# Patient Record
Sex: FEMALE | Race: WHITE | NOT HISPANIC OR LATINO | Employment: STUDENT | URBAN - METROPOLITAN AREA
[De-identification: names, ages, dates, MRNs, and addresses within clinical notes are randomized per-mention and may not be internally consistent; named-entity substitution may affect disease eponyms.]

---

## 2017-01-03 ENCOUNTER — LAB VISIT (OUTPATIENT)
Dept: LAB | Facility: HOSPITAL | Age: 22
End: 2017-01-03
Attending: NURSE PRACTITIONER
Payer: MEDICAID

## 2017-01-03 ENCOUNTER — TELEPHONE (OUTPATIENT)
Dept: FAMILY MEDICINE | Facility: CLINIC | Age: 22
End: 2017-01-03

## 2017-01-03 ENCOUNTER — OFFICE VISIT (OUTPATIENT)
Dept: FAMILY MEDICINE | Facility: CLINIC | Age: 22
End: 2017-01-03
Payer: MEDICAID

## 2017-01-03 VITALS
SYSTOLIC BLOOD PRESSURE: 129 MMHG | WEIGHT: 198.44 LBS | BODY MASS INDEX: 35.16 KG/M2 | DIASTOLIC BLOOD PRESSURE: 83 MMHG | HEART RATE: 110 BPM | HEIGHT: 63 IN | TEMPERATURE: 99 F

## 2017-01-03 DIAGNOSIS — Z71.1 CONCERN ABOUT MISCARRIAGE WITHOUT DIAGNOSIS: ICD-10-CM

## 2017-01-03 DIAGNOSIS — Z30.9 ENCOUNTER FOR CONTRACEPTIVE MANAGEMENT, UNSPECIFIED CONTRACEPTIVE ENCOUNTER TYPE: ICD-10-CM

## 2017-01-03 DIAGNOSIS — R50.9 FEVER, UNSPECIFIED FEVER CAUSE: ICD-10-CM

## 2017-01-03 DIAGNOSIS — Z71.1 CONCERN ABOUT MISCARRIAGE WITHOUT DIAGNOSIS: Primary | ICD-10-CM

## 2017-01-03 DIAGNOSIS — J02.9 PHARYNGITIS, UNSPECIFIED ETIOLOGY: ICD-10-CM

## 2017-01-03 DIAGNOSIS — R11.0 NAUSEA: ICD-10-CM

## 2017-01-03 DIAGNOSIS — R52 BODY ACHES: ICD-10-CM

## 2017-01-03 LAB
BASOPHILS # BLD AUTO: 0.03 K/UL
BASOPHILS NFR BLD: 0.3 %
DIFFERENTIAL METHOD: ABNORMAL
EOSINOPHIL # BLD AUTO: 0.1 K/UL
EOSINOPHIL NFR BLD: 1 %
ERYTHROCYTE [DISTWIDTH] IN BLOOD BY AUTOMATED COUNT: 13 %
FLUAV AG SPEC QL IA: NEGATIVE
FLUBV AG SPEC QL IA: NEGATIVE
HCT VFR BLD AUTO: 41.1 %
HGB BLD-MCNC: 13.2 G/DL
LYMPHOCYTES # BLD AUTO: 1.6 K/UL
LYMPHOCYTES NFR BLD: 18 %
MCH RBC QN AUTO: 27.6 PG
MCHC RBC AUTO-ENTMCNC: 32.1 %
MCV RBC AUTO: 86 FL
MONOCYTES # BLD AUTO: 1.1 K/UL
MONOCYTES NFR BLD: 12.7 %
NEUTROPHILS # BLD AUTO: 6.1 K/UL
NEUTROPHILS NFR BLD: 67.8 %
PLATELET # BLD AUTO: 241 K/UL
PMV BLD AUTO: 10.9 FL
RBC # BLD AUTO: 4.78 M/UL
SPECIMEN SOURCE: NORMAL
WBC # BLD AUTO: 8.99 K/UL

## 2017-01-03 PROCEDURE — 85025 COMPLETE CBC W/AUTO DIFF WBC: CPT

## 2017-01-03 PROCEDURE — 99213 OFFICE O/P EST LOW 20 MIN: CPT | Mod: PBBFAC,PO | Performed by: NURSE PRACTITIONER

## 2017-01-03 PROCEDURE — 36415 COLL VENOUS BLD VENIPUNCTURE: CPT | Mod: PO

## 2017-01-03 PROCEDURE — 99213 OFFICE O/P EST LOW 20 MIN: CPT | Mod: S$PBB,,, | Performed by: NURSE PRACTITIONER

## 2017-01-03 PROCEDURE — 99999 PR PBB SHADOW E&M-EST. PATIENT-LVL III: CPT | Mod: PBBFAC,,, | Performed by: NURSE PRACTITIONER

## 2017-01-03 RX ORDER — AMOXICILLIN 875 MG/1
875 TABLET, FILM COATED ORAL 2 TIMES DAILY
Qty: 20 TABLET | Refills: 0 | Status: SHIPPED | OUTPATIENT
Start: 2017-01-03 | End: 2017-01-13

## 2017-01-03 NOTE — PROGRESS NOTES
"Subjective:       Patient ID: Tomi Ervin is a 21 y.o. female.    Chief Complaint: No chief complaint on file.    Female  Problem   The patient's primary symptoms include missed menses and vaginal discharge (Large "clot" expelled). The patient's pertinent negatives include no genital itching, genital lesions, genital odor, genital rash, pelvic pain or vaginal bleeding. Primary symptoms comment: Pt states had possible miscarriage on Dec. 22. This is a new problem. The current episode started 1 to 4 weeks ago. The problem occurs rarely. The problem has been resolved (No bleeding, vaginal pain or abdominal pain today). The problem affects both sides. She is not pregnant. Associated symptoms include a fever, nausea (resolved) and a sore throat. Pertinent negatives include no abdominal pain, anorexia, back pain, chills, constipation, diarrhea, discolored urine, dysuria, flank pain, frequency, headaches, hematuria, joint pain, joint swelling, rash, urgency or vomiting. The vaginal discharge was bloody. The vaginal bleeding is heavier than menses (resolved). She has been passing clots (x 1). She has been passing tissue (x 1). Nothing aggravates the symptoms. She has tried nothing for the symptoms. The treatment provided significant relief. She is sexually active. No, her partner does not have an STD. She uses oral contraceptives for contraception. Her menstrual history has been regular. There is no history of an abdominal surgery, a  section, an ectopic pregnancy, endometriosis, a gynecological surgery, herpes simplex, menorrhagia, metrorrhagia, miscarriage, ovarian cysts, perineal abscess, PID, an STD, a terminated pregnancy or vaginosis. (Pt also states may want to change birth control; will discuss with gyn)   Sore Throat    This is a new problem. The current episode started in the past 7 days. The problem has been unchanged. The maximum temperature recorded prior to her arrival was 101 - 101.9 F. The " "fever has been present for less than 1 day. The pain is moderate. Pertinent negatives include no abdominal pain, congestion, coughing, diarrhea, drooling, ear discharge, ear pain, headaches, hoarse voice, plugged ear sensation, neck pain, shortness of breath, stridor, swollen glands, trouble swallowing or vomiting. Associated symptoms comments: Fever, body aches. She has had no exposure to strep or mono. She has tried nothing for the symptoms. The treatment provided no relief.     Past Medical History   Diagnosis Date    Fibromyalgia     Obesity (BMI 35.0-39.9 without comorbidity)      Social History     Social History    Marital status: Single     Spouse name: N/A    Number of children: N/A    Years of education: N/A     Occupational History         Social History Main Topics    Smoking status: Current Every Day Smoker     Packs/day: 0.50     Start date: 6/3/2012    Smokeless tobacco: Not on file    Alcohol use 0.6 oz/week     1 Glasses of wine per week    Drug use: Not on file    Sexual activity: Not on file               Social History Narrative     Past Surgical History   Procedure Laterality Date    Tonsillectomy         Review of Systems   Constitutional: Positive for fever. Negative for chills.   HENT: Positive for sore throat. Negative for congestion, drooling, ear discharge, ear pain, hoarse voice and trouble swallowing.    Eyes: Negative.    Respiratory: Negative.  Negative for cough, shortness of breath and stridor.    Cardiovascular: Negative.    Gastrointestinal: Positive for nausea (resolved). Negative for abdominal pain, anorexia, constipation, diarrhea and vomiting.   Endocrine: Negative.    Genitourinary: Positive for missed menses and vaginal discharge (Large "clot" expelled). Negative for dysuria, flank pain, frequency, hematuria, menorrhagia, pelvic pain and urgency.   Musculoskeletal: Negative.  Negative for back pain, joint pain and neck pain.   Skin: Negative.  Negative for rash. "   Allergic/Immunologic: Negative.    Neurological: Negative.  Negative for headaches.   Psychiatric/Behavioral: Negative.        Objective:      Physical Exam   Constitutional: She is oriented to person, place, and time. She appears well-developed and well-nourished.   HENT:   Head: Normocephalic.   Right Ear: Hearing, tympanic membrane, external ear and ear canal normal.   Left Ear: Hearing, tympanic membrane, external ear and ear canal normal.   Nose: Nose normal.   Mouth/Throat: Mucous membranes are normal. Posterior oropharyngeal erythema present.   Eyes: Conjunctivae are normal. Pupils are equal, round, and reactive to light.   Neck: Normal range of motion. Neck supple.   Cardiovascular: Normal rate, regular rhythm and normal heart sounds.    Pulmonary/Chest: Effort normal and breath sounds normal.   Abdominal: Soft. Bowel sounds are normal.   Musculoskeletal: Normal range of motion.   Neurological: She is alert and oriented to person, place, and time.   Skin: Skin is warm and dry.   Psychiatric: She has a normal mood and affect. Her behavior is normal. Judgment and thought content normal.   Nursing note and vitals reviewed.      Assessment:       1. Concern about miscarriage without diagnosis    2. Encounter for contraceptive management, unspecified contraceptive encounter type    3. Pharyngitis, unspecified etiology    4. Nausea    5. Fever, unspecified fever cause    6. Body aches        Plan:           Diagnoses and all orders for this visit:    Concern about miscarriage without diagnosis  Encounter for contraceptive management, unspecified contraceptive encounter type  -     Ambulatory referral to Obstetrics / Gynecology  -     CBC auto differential; Future    Pharyngitis, unspecified etiology  Nausea  Fever, unspecified fever cause  Body aches  -     Influenza antigen Nasopharyngeal Swab  -     CBC auto differential; Future  -     amoxicillin (AMOXIL) 875 MG tablet; Take 1 tablet (875 mg total) by mouth 2  (two) times daily.

## 2017-01-03 NOTE — MR AVS SNAPSHOT
[facility]  [address]  [place] [address]  Phone: [phone]  Fax: [phone]                  Tomi Ervin   1/3/2017 11:20 AM   Office Visit    Description:  Female : 1995   Provider:  Nicole Quintanilla NP   Department:  [facility]           Diagnoses this Visit        Comments    Concern about miscarriage without diagnosis    -  Primary     Encounter for contraceptive management, unspecified contraceptive encounter type         Pharyngitis, unspecified etiology         Nausea         Fever, unspecified fever cause         Body aches                To Do List           Goals (5 Years of Data)     None       These Medications        Disp Refills Start End    amoxicillin (AMOXIL) 875 MG tablet 20 tablet 0 1/3/2017 2017    Take 1 tablet (875 mg total) by mouth 2 (two) times daily. - Oral    Pharmacy: [facility]/pharmacy #[id number] - [place] - [address] [facility][[address]] #: [phone]         [facility] On Call     [facility] On Call Nurse Care Line -  Assistance  Registered nurses in the [facility] On Call Center provide clinical advisement, health education, appointment booking, and other advisory services.  Call for this free service at [phone].             Medications           Message regarding Medications     Verify the changes and/or additions to your medication regime listed below are the same as discussed with your clinician today.  If any of these changes or additions are incorrect, please notify your healthcare provider.        START taking these NEW medications        Refills    amoxicillin (AMOXIL) 875 MG tablet 0    Sig: Take 1 tablet (875 mg total) by mouth 2 (two) times daily.    Class: Normal    Route: Oral           Verify that the below list of medications is an accurate representation of the medications you are currently taking.  If none reported, the list may be blank. If incorrect,  "please contact your healthcare provider. Carry this list with you in case of emergency.           Current Medications     escitalopram oxalate (LEXAPRO) 10 MG tablet TAKE 1 TABLET (10 MG TOTAL) BY MOUTH ONCE DAILY.    norgestimate-ethinyl estradiol (ORTHO TRI-CYCLEN LO) 0.18/0.215/0.25 mg-25 mcg tablet Take 1 tablet by mouth once daily.    ranitidine (ZANTAC) 75 MG tablet Take 75 mg by mouth.    amoxicillin (AMOXIL) 875 MG tablet Take 1 tablet (875 mg total) by mouth 2 (two) times daily.           Clinical Reference Information           Vital Signs - Last Recorded  Most recent update: 1/3/2017 11:21 AM by Tin Monroe LPN    BP Pulse Temp Ht Wt BMI    129/83 (BP Location: Right arm, Patient Position: Sitting, BP Method: Automatic) 110 99 °F (37.2 °C) (Oral) 5' 2.5" (1.588 m) 90 kg (198 lb 6.6 oz) 35.71 kg/m2      Blood Pressure          Most Recent Value    BP  129/83      Allergies as of 1/3/2017     No Known Allergies      Immunizations Administered on Date of Encounter - 1/3/2017     None      Orders Placed During Today's Visit      Normal Orders This Visit    Ambulatory referral to Obstetrics / Gynecology     Influenza antigen Nasopharyngeal Swab     Future Labs/Procedures Expected by Expires    CBC auto differential  1/3/2017 3/4/2018      Instructions    Report to ER immediately if symptoms worsen  Tylenol or motrin OTC as directed; may alternate  Remain well hydrated       Smoking Cessation     If you would like to quit smoking:   You may be eligible for free services if you are a [place] resident and started smoking cigarettes before September 1, 1988.  Call the Smoking Cessation Trust (SCT) toll free at (526) 336-6325 or (513) 298-5665.   Call 5-393-QUIT-NOW if you do not meet the above criteria.            "

## 2017-01-03 NOTE — PATIENT INSTRUCTIONS
Report to ER immediately if symptoms worsen  Tylenol or motrin OTC as directed; may alternate  Remain well hydrated

## 2017-01-04 ENCOUNTER — TELEPHONE (OUTPATIENT)
Dept: FAMILY MEDICINE | Facility: CLINIC | Age: 22
End: 2017-01-04

## 2017-01-04 NOTE — TELEPHONE ENCOUNTER
Spoke with patient  She went to [place] ER last night still running fever 103  They did additional test and was told everything was negative that she probably has a virus.  Her fever continues to spike to 103 She wanted you to be aware and see if you suggest anything else

## 2017-01-04 NOTE — TELEPHONE ENCOUNTER
Continue current plan of care and f/u with PCP if symptoms persist. Hydrate well. Alternate tylenol and motrin for temp control. Go back to ER if she gets worse.

## 2017-01-04 NOTE — TELEPHONE ENCOUNTER
----- Message from Ramirez Miranda sent at 1/4/2017  1:35 PM CST -----  Contact: pt rachel rodriguez   States he is calling rg pt going to ER for fever temp. Currently 103.3 temp and has an rx for steriod and is worried that her temp will go up with the meds and went to [facility] ER and can be reached at [phone] or [phone] ( pt's jennifer)//thanks/dbw

## 2017-01-05 ENCOUNTER — PATIENT MESSAGE (OUTPATIENT)
Dept: FAMILY MEDICINE | Facility: CLINIC | Age: 22
End: 2017-01-05

## 2017-02-23 ENCOUNTER — OFFICE VISIT (OUTPATIENT)
Dept: FAMILY MEDICINE | Facility: CLINIC | Age: 22
End: 2017-02-23
Payer: MEDICAID

## 2017-02-23 ENCOUNTER — HOSPITAL ENCOUNTER (OUTPATIENT)
Dept: RADIOLOGY | Facility: HOSPITAL | Age: 22
Discharge: HOME | End: 2017-02-23
Attending: NURSE PRACTITIONER
Payer: COMMERCIAL

## 2017-02-23 VITALS
TEMPERATURE: 99 F | BODY MASS INDEX: 37.26 KG/M2 | DIASTOLIC BLOOD PRESSURE: 80 MMHG | WEIGHT: 202.5 LBS | HEART RATE: 121 BPM | SYSTOLIC BLOOD PRESSURE: 123 MMHG | HEIGHT: 62 IN

## 2017-02-23 DIAGNOSIS — M25.511 ACUTE PAIN OF RIGHT SHOULDER: ICD-10-CM

## 2017-02-23 DIAGNOSIS — M25.511 ACUTE PAIN OF RIGHT SHOULDER: Primary | ICD-10-CM

## 2017-02-23 PROCEDURE — 99213 OFFICE O/P EST LOW 20 MIN: CPT | Mod: S$PBB,,, | Performed by: NURSE PRACTITIONER

## 2017-02-23 PROCEDURE — 99213 OFFICE O/P EST LOW 20 MIN: CPT | Mod: PBBFAC,PO | Performed by: NURSE PRACTITIONER

## 2017-02-23 PROCEDURE — 73030 X-RAY EXAM OF SHOULDER: CPT | Mod: TC,PO,RT

## 2017-02-23 PROCEDURE — 99999 PR PBB SHADOW E&M-EST. PATIENT-LVL III: CPT | Mod: PBBFAC,,, | Performed by: NURSE PRACTITIONER

## 2017-02-23 PROCEDURE — 73030 X-RAY EXAM OF SHOULDER: CPT | Mod: 26,RT,, | Performed by: RADIOLOGY

## 2017-02-23 NOTE — MR AVS SNAPSHOT
"    [facility]  [address] [place] [address]  Phone: [phone]  Fax: [phone]                  Tomi Ervin   2017 1:40 PM   Office Visit    Description:  Female : 1995   Provider:  Nicole Quintanilla NP   Department:  [facility]           Diagnoses this Visit        Comments    Acute pain of right shoulder    -  Primary            To Do List           Future Appointments        Provider Department Dept Phone    2017 2:15 PM [facility] XR1 [facility] [phone]      Goals (5 Years of Data)     None      [facility] On Call     [facility] On Call Nurse Care Line -  Assistance  Registered nurses in the [facility] On Call Center provide clinical advisement, health education, appointment booking, and other advisory services.  Call for this free service at [phone].             Medications           Message regarding Medications     Verify the changes and/or additions to your medication regime listed below are the same as discussed with your clinician today.  If any of these changes or additions are incorrect, please notify your healthcare provider.        STOP taking these medications     ranitidine (ZANTAC) 75 MG tablet Take 75 mg by mouth.           Verify that the below list of medications is an accurate representation of the medications you are currently taking.  If none reported, the list may be blank. If incorrect, please contact your healthcare provider. Carry this list with you in case of emergency.           Current Medications     escitalopram oxalate (LEXAPRO) 10 MG tablet TAKE 1 TABLET (10 MG TOTAL) BY MOUTH ONCE DAILY.    norgestimate-ethinyl estradiol (ORTHO TRI-CYCLEN LO) 0.18/0.215/0.25 mg-25 mcg tablet Take 1 tablet by mouth once daily.           Clinical Reference Information           Your Vitals Were     BP Pulse Temp Height Weight Last Period    123/80 121 98.8 °F (37.1 °C) 5' 2" (1.575 m) 91.8 kg " (202 lb 7.9 oz) 02/21/2017 (Approximate)    BMI                37.04 kg/m2          Blood Pressure          Most Recent Value    BP  123/80      Allergies as of 2/23/2017     No Known Allergies      Immunizations Administered on Date of Encounter - 2/23/2017     None      Orders Placed During Today's Visit     Future Labs/Procedures Expected by Expires    X-ray Shoulder 2 or More Views Right  2/23/2017 2/23/2018      Smoking Cessation     If you would like to quit smoking:   You may be eligible for free services if you are a [place] resident and started smoking cigarettes before September 1, 1988.  Call the Smoking Cessation Trust (SCT) toll free at (706) 502-9822 or (440) 511-4703.   Call 7-089-QUIT-NOW if you do not meet the above criteria.            Language Assistance Services     ATTENTION: Language assistance services are available, free of charge. Please call [phone].      ATENCIÓN: Si habla español, tiene a nathan disposición servicios gratuitos de asistencia lingüística. Llame al [phone].     CHÚ Ý: N?u b?n nói Ti?ng Vi?t, có các d?ch v? h? tr? ngôn ng? mi?n phí dành cho b?n. G?i s? [phone].         [facility] complies with applicable Federal civil rights laws and does not discriminate on the basis of race, color, national origin, age, disability, or sex.

## 2017-02-23 NOTE — PROGRESS NOTES
Subjective:       Patient ID: Tomi Ervin is a 21 y.o. female.    Chief Complaint: No chief complaint on file.    Shoulder Pain    The pain is present in the right shoulder. This is a new problem. The current episode started in the past 7 days. History of extremity trauma: Unknown. The problem occurs daily. The problem has been unchanged. The quality of the pain is described as aching. The pain is mild. Associated symptoms include numbness (intermittently) and stiffness. Pertinent negatives include no fever, headaches, inability to bear weight, itching, joint locking, joint swelling, limited range of motion, tingling or visual symptoms. The symptoms are aggravated by activity. She has tried nothing for the symptoms. The treatment provided no relief. Family history does not include arthritis. There is no history of diabetes, Injuries to Extremity or migraines. Pt declines any medications      Past Medical History   Diagnosis Date    Fibromyalgia     Obesity (BMI 35.0-39.9 without comorbidity)      Social History     Social History    Marital status: Single     Spouse name: N/A    Number of children: N/A    Years of education: N/A     Occupational History         Social History Main Topics    Smoking status: Current Every Day Smoker     Packs/day: 0.50     Start date: 6/3/2012    Smokeless tobacco: Not on file    Alcohol use 0.6 oz/week     1 Glasses of wine per week    Drug use: Not on file    Sexual activity: Not on file     Past Surgical History   Procedure Laterality Date    Tonsillectomy         Review of Systems   Constitutional: Negative.  Negative for fever.   HENT: Negative.    Eyes: Negative.    Respiratory: Negative.    Cardiovascular: Negative.    Gastrointestinal: Negative.    Endocrine: Negative.    Genitourinary: Negative.    Musculoskeletal: Positive for stiffness.        Right shoulder pain   Skin: Negative.  Negative for itching.   Allergic/Immunologic: Negative.    Neurological:  Positive for numbness (intermittently). Negative for tingling and headaches.   Psychiatric/Behavioral: Negative.        Objective:      Physical Exam   Constitutional: She is oriented to person, place, and time. She appears well-developed and well-nourished.   HENT:   Head: Normocephalic.   Right Ear: External ear normal.   Left Ear: External ear normal.   Nose: Nose normal.   Mouth/Throat: Oropharynx is clear and moist.   Eyes: Conjunctivae are normal. Pupils are equal, round, and reactive to light.   Neck: Normal range of motion. Neck supple.   Cardiovascular: Normal rate, regular rhythm and normal heart sounds.    Pulmonary/Chest: Effort normal and breath sounds normal.   Abdominal: Soft. Bowel sounds are normal.   Musculoskeletal: Normal range of motion.        Right shoulder: She exhibits bony tenderness. She exhibits normal range of motion, no tenderness, no swelling, no effusion, no crepitus, no deformity, no laceration, no pain, no spasm, normal pulse and normal strength.   Neurological: She is alert and oriented to person, place, and time.   Skin: Skin is warm and dry.   Psychiatric: She has a normal mood and affect. Her behavior is normal. Judgment and thought content normal.   Nursing note and vitals reviewed.      Assessment:       1. Acute pain of right shoulder        Plan:           Diagnoses and all orders for this visit:    Acute pain of right shoulder  -     X-ray Shoulder 2 or More Views Right; Future  Declines medication

## 2017-02-27 ENCOUNTER — PATIENT MESSAGE (OUTPATIENT)
Dept: FAMILY MEDICINE | Facility: CLINIC | Age: 22
End: 2017-02-27

## 2017-02-27 RX ORDER — TRAMADOL HYDROCHLORIDE 50 MG/1
50 TABLET ORAL EVERY 8 HOURS PRN
Qty: 30 TABLET | Refills: 0 | Status: SHIPPED | OUTPATIENT
Start: 2017-02-27 | End: 2017-03-09

## 2017-02-27 NOTE — TELEPHONE ENCOUNTER
I was in the office a few days ago for shoulder pain. At the time I refused any medicine for pain. I thought by now the shoulder would be feeling better but the pain and nerve related tingling is getting worse. Is it too late for me to get something to help with that? Or should I make a new appointment with you?

## 2017-05-05 RX ORDER — NORGESTIMATE AND ETHINYL ESTRADIOL 7DAYSX3 LO
KIT ORAL
Qty: 28 TABLET | Refills: 0 | Status: SHIPPED | OUTPATIENT
Start: 2017-05-05 | End: 2017-05-30 | Stop reason: SDUPTHER

## 2017-05-30 RX ORDER — NORGESTIMATE AND ETHINYL ESTRADIOL 7DAYSX3 LO
KIT ORAL
Qty: 28 TABLET | Refills: 0 | Status: SHIPPED | OUTPATIENT
Start: 2017-05-30 | End: 2017-06-12 | Stop reason: SDUPTHER

## 2017-06-12 RX ORDER — NORGESTIMATE AND ETHINYL ESTRADIOL 7DAYSX3 LO
KIT ORAL
Qty: 28 TABLET | Refills: 0 | Status: SHIPPED | OUTPATIENT
Start: 2017-06-12 | End: 2017-07-19 | Stop reason: SDUPTHER

## 2017-07-19 ENCOUNTER — OFFICE VISIT (OUTPATIENT)
Dept: FAMILY MEDICINE | Facility: CLINIC | Age: 22
End: 2017-07-19
Payer: COMMERCIAL

## 2017-07-19 VITALS
WEIGHT: 208.31 LBS | BODY MASS INDEX: 38.33 KG/M2 | HEIGHT: 62 IN | DIASTOLIC BLOOD PRESSURE: 83 MMHG | TEMPERATURE: 98 F | SYSTOLIC BLOOD PRESSURE: 125 MMHG | HEART RATE: 74 BPM

## 2017-07-19 DIAGNOSIS — Z30.9 ENCOUNTER FOR CONTRACEPTIVE MANAGEMENT, UNSPECIFIED TYPE: ICD-10-CM

## 2017-07-19 DIAGNOSIS — Z76.0 MEDICATION REFILL: ICD-10-CM

## 2017-07-19 DIAGNOSIS — F41.9 ANXIETY: Primary | ICD-10-CM

## 2017-07-19 LAB — B-HCG UR QL: NEGATIVE

## 2017-07-19 PROCEDURE — 81025 URINE PREGNANCY TEST: CPT | Mod: PO

## 2017-07-19 PROCEDURE — 99999 PR PBB SHADOW E&M-EST. PATIENT-LVL III: CPT | Mod: PBBFAC,,, | Performed by: NURSE PRACTITIONER

## 2017-07-19 PROCEDURE — 99213 OFFICE O/P EST LOW 20 MIN: CPT | Mod: S$GLB,,, | Performed by: NURSE PRACTITIONER

## 2017-07-19 RX ORDER — ALPRAZOLAM 0.25 MG/1
0.25 TABLET ORAL 2 TIMES DAILY PRN
Qty: 10 TABLET | Refills: 0 | Status: SHIPPED | OUTPATIENT
Start: 2017-07-19 | End: 2018-04-18 | Stop reason: SDUPTHER

## 2017-07-19 RX ORDER — NORGESTIMATE AND ETHINYL ESTRADIOL 7DAYSX3 LO
1 KIT ORAL DAILY
Qty: 28 TABLET | Refills: 11 | Status: SHIPPED | OUTPATIENT
Start: 2017-07-19 | End: 2018-06-15 | Stop reason: SDUPTHER

## 2017-07-19 RX ORDER — ESCITALOPRAM OXALATE 20 MG/1
20 TABLET ORAL DAILY
Qty: 90 TABLET | Refills: 0 | Status: SHIPPED | OUTPATIENT
Start: 2017-07-19 | End: 2017-08-18 | Stop reason: SDUPTHER

## 2017-07-19 NOTE — PROGRESS NOTES
"Subjective:       Patient ID: Tomi Ervin is a 22 y.o. female.    Chief Complaint: Medication Refill (birth control) and Medication Problem    Anxiety   Presents for follow-up visit. Symptoms include irritability, nervous/anxious behavior and panic. Patient reports no chest pain, compulsions, confusion, decreased concentration, depressed mood, dizziness, dry mouth, excessive worry, feeling of choking, hyperventilation, impotence, insomnia, malaise, muscle tension, nausea, obsessions, palpitations, restlessness, shortness of breath or suicidal ideas. Symptoms occur occasionally (Pt states lexapro was working well, but not working as well as it was before). The quality of sleep is fair. Nighttime awakenings: occasional.     Compliance with medications is % (Requests additional medication for "breakthrough" anxiety. ).   Pt also requests birth control refill.  Past Medical History:   Diagnosis Date    Fibromyalgia     Obesity (BMI 35.0-39.9 without comorbidity)      Social History     Social History    Marital status: Single     Spouse name: N/A    Number of children: N/A    Years of education: N/A     Occupational History         Social History Main Topics    Smoking status: Current Every Day Smoker     Packs/day: 0.50     Start date: 6/3/2012    Smokeless tobacco: Not on file    Alcohol use 0.6 oz/week     1 Glasses of wine per week    Drug use: Unknown    Sexual activity: Not on file     Past Surgical History:   Procedure Laterality Date    TONSILLECTOMY         Review of Systems   Constitutional: Positive for irritability.   HENT: Negative.    Eyes: Negative.    Respiratory: Negative.  Negative for shortness of breath.    Cardiovascular: Negative.  Negative for chest pain and palpitations.   Gastrointestinal: Negative.  Negative for nausea.   Endocrine: Negative.    Genitourinary: Negative.  Negative for impotence.   Musculoskeletal: Negative.    Skin: Negative.    Allergic/Immunologic: " Negative.    Neurological: Negative.  Negative for dizziness.   Psychiatric/Behavioral: Negative for confusion, decreased concentration and suicidal ideas. The patient is nervous/anxious. The patient does not have insomnia.        Objective:      Physical Exam   Constitutional: She is oriented to person, place, and time. She appears well-developed and well-nourished.   HENT:   Head: Normocephalic.   Right Ear: External ear normal.   Left Ear: External ear normal.   Nose: Nose normal.   Mouth/Throat: Oropharynx is clear and moist.   Eyes: Conjunctivae are normal. Pupils are equal, round, and reactive to light.   Neck: Normal range of motion. Neck supple.   Cardiovascular: Normal rate, regular rhythm and normal heart sounds.    Pulmonary/Chest: Effort normal and breath sounds normal.   Abdominal: Soft. Bowel sounds are normal.   Musculoskeletal: Normal range of motion.   Neurological: She is alert and oriented to person, place, and time.   Skin: Skin is warm and dry. Capillary refill takes 2 to 3 seconds.   Psychiatric: She has a normal mood and affect. Her behavior is normal. Judgment and thought content normal.   Nursing note and vitals reviewed.      Assessment:       1. Anxiety    2. Encounter for contraceptive management, unspecified type    3. Medication refill        Plan:           Tomi was seen today for medication refill and medication problem.    Diagnoses and all orders for this visit:    Anxiety  -     escitalopram oxalate (LEXAPRO) 20 MG tablet; Take 1 tablet (20 mg total) by mouth once daily.  Xanax request sent to PCP for approval  Report to ER immediately if symptoms worsen  F/U in 1 m for assessment following medication adjustment    Encounter for contraceptive management, unspecified type  Medication refill  -     Pregnancy, urine rapid  -     norgestimate-ethinyl estradiol (TRI-LO-SPRINTEC) 0.18/0.215/0.25 mg-25 mcg tablet; Take 1 tablet by mouth once daily.

## 2017-07-19 NOTE — PATIENT INSTRUCTIONS
Xanax request sent to PCP for approval  Report to ER immediately if symptoms worsen  F/U in 1 m for assessment following medication adjustment

## 2017-08-18 ENCOUNTER — OFFICE VISIT (OUTPATIENT)
Dept: FAMILY MEDICINE | Facility: CLINIC | Age: 22
End: 2017-08-18
Payer: COMMERCIAL

## 2017-08-18 VITALS
WEIGHT: 216 LBS | HEART RATE: 95 BPM | BODY MASS INDEX: 39.75 KG/M2 | TEMPERATURE: 98 F | HEIGHT: 62 IN | SYSTOLIC BLOOD PRESSURE: 116 MMHG | DIASTOLIC BLOOD PRESSURE: 81 MMHG

## 2017-08-18 DIAGNOSIS — Z09 FOLLOW UP: Primary | ICD-10-CM

## 2017-08-18 DIAGNOSIS — F41.9 ANXIETY: ICD-10-CM

## 2017-08-18 PROCEDURE — 3008F BODY MASS INDEX DOCD: CPT | Mod: S$GLB,,, | Performed by: NURSE PRACTITIONER

## 2017-08-18 PROCEDURE — 99999 PR PBB SHADOW E&M-EST. PATIENT-LVL III: CPT | Mod: PBBFAC,,, | Performed by: NURSE PRACTITIONER

## 2017-08-18 PROCEDURE — 99213 OFFICE O/P EST LOW 20 MIN: CPT | Mod: S$GLB,,, | Performed by: NURSE PRACTITIONER

## 2017-08-18 RX ORDER — ESCITALOPRAM OXALATE 20 MG/1
20 TABLET ORAL DAILY
Qty: 90 TABLET | Refills: 3 | Status: SHIPPED | OUTPATIENT
Start: 2017-08-18 | End: 2018-01-16 | Stop reason: SDUPTHER

## 2017-08-18 NOTE — PROGRESS NOTES
Subjective:       Patient ID: Tomi Ervin is a 22 y.o. female.    Chief Complaint: Follow-up  Pt in today for follow up anxiety. Her lexapro was increased at her past visit 1 m ago. She states that the medication has been working well. Denies SI/HI. Pt has no other complaints today.  Past Medical History:   Diagnosis Date    Fibromyalgia     Obesity (BMI 35.0-39.9 without comorbidity)      Social History     Social History    Marital status: Single     Spouse name: N/A    Number of children: N/A    Years of education: N/A     Occupational History         Social History Main Topics    Smoking status: Current Every Day Smoker     Packs/day: 0.50     Start date: 6/3/2012    Smokeless tobacco: Not on file    Alcohol use 0.6 oz/week     1 Glasses of wine per week    Drug use: Unknown    Sexual activity: Not on file     Past Surgical History:   Procedure Laterality Date    TONSILLECTOMY         HPI  Review of Systems   Constitutional: Negative.  Negative for activity change and unexpected weight change.   HENT: Negative.  Negative for hearing loss, rhinorrhea and trouble swallowing.    Eyes: Negative.  Negative for discharge and visual disturbance.   Respiratory: Negative.  Negative for chest tightness and wheezing.    Cardiovascular: Negative.  Negative for chest pain.   Gastrointestinal: Negative.  Negative for blood in stool, constipation, diarrhea and vomiting.   Endocrine: Negative.  Negative for polydipsia and polyuria.   Genitourinary: Negative.  Negative for difficulty urinating, dysuria, hematuria and menstrual problem.   Musculoskeletal: Negative.  Negative for arthralgias, joint swelling and neck pain.   Skin: Negative.    Allergic/Immunologic: Negative.    Neurological: Negative.  Negative for weakness.   Psychiatric/Behavioral: Negative.  Negative for confusion and dysphoric mood.       Objective:      Physical Exam   Constitutional: She is oriented to person, place, and time. She appears  well-developed and well-nourished.   HENT:   Head: Normocephalic.   Right Ear: External ear normal.   Left Ear: External ear normal.   Nose: Nose normal.   Mouth/Throat: Oropharynx is clear and moist.   Eyes: Conjunctivae are normal. Pupils are equal, round, and reactive to light.   Neck: Normal range of motion. Neck supple.   Cardiovascular: Normal rate, regular rhythm and normal heart sounds.    Pulmonary/Chest: Effort normal and breath sounds normal.   Abdominal: Soft. Bowel sounds are normal.   Musculoskeletal: Normal range of motion.   Neurological: She is alert and oriented to person, place, and time.   Skin: Skin is warm and dry. Capillary refill takes 2 to 3 seconds.   Psychiatric: She has a normal mood and affect. Her behavior is normal. Judgment and thought content normal.   Nursing note and vitals reviewed.      Assessment:       1. Follow up    2. Anxiety        Plan:           Tomi was seen today for follow-up.    Diagnoses and all orders for this visit:    Follow up  Anxiety  -     escitalopram oxalate (LEXAPRO) 20 MG tablet; Take 1 tablet (20 mg total) by mouth once daily.  Continue current medications  RTC as needed

## 2018-01-16 ENCOUNTER — OFFICE VISIT (OUTPATIENT)
Dept: FAMILY MEDICINE | Facility: CLINIC | Age: 23
End: 2018-01-16
Payer: COMMERCIAL

## 2018-01-16 VITALS
BODY MASS INDEX: 39.77 KG/M2 | SYSTOLIC BLOOD PRESSURE: 106 MMHG | WEIGHT: 224.44 LBS | HEART RATE: 83 BPM | HEIGHT: 63 IN | DIASTOLIC BLOOD PRESSURE: 73 MMHG

## 2018-01-16 DIAGNOSIS — F41.9 ANXIETY: Primary | ICD-10-CM

## 2018-01-16 DIAGNOSIS — F40.01 AGORAPHOBIA WITH PANIC ATTACKS: ICD-10-CM

## 2018-01-16 PROCEDURE — 99999 PR PBB SHADOW E&M-EST. PATIENT-LVL III: CPT | Mod: PBBFAC,,, | Performed by: FAMILY MEDICINE

## 2018-01-16 PROCEDURE — 99213 OFFICE O/P EST LOW 20 MIN: CPT | Mod: S$GLB,,, | Performed by: FAMILY MEDICINE

## 2018-01-16 RX ORDER — ESCITALOPRAM OXALATE 20 MG/1
20 TABLET ORAL DAILY
Qty: 90 TABLET | Refills: 3 | Status: SHIPPED | OUTPATIENT
Start: 2018-01-16 | End: 2018-04-18 | Stop reason: SDUPTHER

## 2018-01-17 NOTE — PROGRESS NOTES
Patient presents follow-up of anxiety and panic attacks.  Doing much better with Lexapro.  Rarely uses Xanax.  Last panic attack more than a month ago.  She wants to continue medication.  Denies depression.  No SI/HI    Past Medical History:  Past Medical History:   Diagnosis Date    Anxiety 1/16/2018    Fibromyalgia     Obesity (BMI 35.0-39.9 without comorbidity)      Past Surgical History:   Procedure Laterality Date    TONSILLECTOMY       Social History     Social History    Marital status: Single     Spouse name: N/A    Number of children: N/A    Years of education: N/A     Occupational History    Not on file.     Social History Main Topics    Smoking status: Current Every Day Smoker     Packs/day: 0.50     Start date: 6/3/2012    Smokeless tobacco: Not on file    Alcohol use 0.6 oz/week     1 Glasses of wine per week    Drug use: Unknown    Sexual activity: Not on file     Other Topics Concern    Not on file     Social History Narrative    No narrative on file     Family History   Problem Relation Age of Onset    Diabetes Mother     Uterine cancer Paternal Grandmother      Review of patient's allergies indicates:  No Known Allergies  Current Outpatient Prescriptions on File Prior to Visit   Medication Sig Dispense Refill    [DISCONTINUED] escitalopram oxalate (LEXAPRO) 20 MG tablet Take 1 tablet (20 mg total) by mouth once daily. 90 tablet 3    alprazolam (XANAX) 0.25 MG tablet Take 1 tablet (0.25 mg total) by mouth 2 (two) times daily as needed for Anxiety. 10 tablet 0    norgestimate-ethinyl estradiol (TRI-LO-SPRINTEC) 0.18/0.215/0.25 mg-25 mcg tablet Take 1 tablet by mouth once daily. 28 tablet 11    ranitidine (ZANTAC) 150 MG capsule Take 150 mg by mouth 2 (two) times daily.       No current facility-administered medications on file prior to visit.            ROS:  GENERAL: No fever, chills,  or significant weight changes.   CARDIOVASCULAR: Denies chest pain, PND, orthopnea or reduced  "exercise tolerance.  ABDOMEN: Appetite fine. Denies diarrhea, abdominal pain, hematemesis or blood in stool.  URINARY: No flank pain, dysuria or hematuria.    Vitals:    01/16/18 1040   BP: 106/73   Pulse: 83   Weight: 101.8 kg (224 lb 6.9 oz)   Height: 5' 2.5" (1.588 m)       Wt Readings from Last 3 Encounters:   01/16/18 101.8 kg (224 lb 6.9 oz)   08/18/17 98 kg (216 lb)   07/19/17 94.5 kg (208 lb 5.4 oz)       APPEARANCE: Well nourished, well developed, in no acute distress.    HEAD: Normocephalic.  Atraumatic.  EYES:   Right eye: Pupil reactive.  Conjunctiva clear.    Left eye: Pupil reactive.  Conjunctiva clear.    NECK: Supple. No bruits.  No JVD.  No cervical lymphadenopathy.  No thyromegaly.    CHEST: Breath sounds clear bilaterally.  Normal respiratory effort  CARDIOVASCULAR: Normal rate.  Regular rhythm.  No murmurs.  No rub.  No gallops.   No edema.  MENTAL STATUS: Alert.  Oriented x 3.  Tomi was seen today for follow-up.    Diagnoses and all orders for this visit:    Anxiety    Agoraphobia with panic attacks    Other orders  -     escitalopram oxalate (LEXAPRO) 20 MG tablet; Take 1 tablet (20 mg total) by mouth once daily.          Tomi was seen today for follow-up.    Diagnoses and all orders for this visit:    Anxiety    Agoraphobia with panic attacks    Other orders  -     escitalopram oxalate (LEXAPRO) 20 MG tablet; Take 1 tablet (20 mg total) by mouth once daily.      Continue current medication  "

## 2018-04-17 ENCOUNTER — PATIENT OUTREACH (OUTPATIENT)
Dept: ADMINISTRATIVE | Facility: HOSPITAL | Age: 23
End: 2018-04-17

## 2018-04-18 ENCOUNTER — OFFICE VISIT (OUTPATIENT)
Dept: FAMILY MEDICINE | Facility: CLINIC | Age: 23
End: 2018-04-18
Payer: COMMERCIAL

## 2018-04-18 VITALS
HEIGHT: 62 IN | WEIGHT: 226 LBS | BODY MASS INDEX: 41.59 KG/M2 | SYSTOLIC BLOOD PRESSURE: 121 MMHG | HEART RATE: 98 BPM | DIASTOLIC BLOOD PRESSURE: 81 MMHG

## 2018-04-18 DIAGNOSIS — N92.1 BREAKTHROUGH BLEEDING ON BIRTH CONTROL PILLS: ICD-10-CM

## 2018-04-18 DIAGNOSIS — F41.9 ANXIETY: ICD-10-CM

## 2018-04-18 DIAGNOSIS — E66.01 MORBID OBESITY: ICD-10-CM

## 2018-04-18 DIAGNOSIS — F40.01 AGORAPHOBIA WITH PANIC ATTACKS: Primary | ICD-10-CM

## 2018-04-18 PROCEDURE — 99214 OFFICE O/P EST MOD 30 MIN: CPT | Mod: S$GLB,,, | Performed by: FAMILY MEDICINE

## 2018-04-18 PROCEDURE — 99999 PR PBB SHADOW E&M-EST. PATIENT-LVL III: CPT | Mod: PBBFAC,,, | Performed by: FAMILY MEDICINE

## 2018-04-18 RX ORDER — SERTRALINE HYDROCHLORIDE 100 MG/1
100 TABLET, FILM COATED ORAL DAILY
Qty: 30 TABLET | Refills: 5 | Status: SHIPPED | OUTPATIENT
Start: 2018-04-18 | End: 2018-09-11

## 2018-04-18 RX ORDER — ALPRAZOLAM 0.25 MG/1
0.25 TABLET ORAL 2 TIMES DAILY PRN
Qty: 20 TABLET | Refills: 0 | Status: SHIPPED | OUTPATIENT
Start: 2018-04-18 | End: 2018-08-03

## 2018-04-18 RX ORDER — ESCITALOPRAM OXALATE 20 MG/1
TABLET ORAL
Refills: 3 | COMMUNITY
Start: 2018-04-18 | End: 2018-05-24

## 2018-04-18 RX ORDER — LORATADINE 10 MG/1
10 TABLET ORAL DAILY
COMMUNITY
End: 2018-11-08

## 2018-04-18 RX ORDER — SERTRALINE HYDROCHLORIDE 25 MG/1
TABLET, FILM COATED ORAL
Qty: 21 TABLET | Refills: 0 | Status: SHIPPED | OUTPATIENT
Start: 2018-04-18 | End: 2018-05-24
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
## 2018-05-09 ENCOUNTER — PATIENT OUTREACH (OUTPATIENT)
Dept: ADMINISTRATIVE | Facility: HOSPITAL | Age: 23
End: 2018-05-09

## 2018-05-24 ENCOUNTER — OFFICE VISIT (OUTPATIENT)
Dept: FAMILY MEDICINE | Facility: CLINIC | Age: 23
End: 2018-05-24
Payer: COMMERCIAL

## 2018-05-24 VITALS
HEIGHT: 63 IN | SYSTOLIC BLOOD PRESSURE: 107 MMHG | HEART RATE: 87 BPM | WEIGHT: 225 LBS | DIASTOLIC BLOOD PRESSURE: 74 MMHG | BODY MASS INDEX: 39.87 KG/M2

## 2018-05-24 DIAGNOSIS — K21.9 GASTROESOPHAGEAL REFLUX DISEASE, ESOPHAGITIS PRESENCE NOT SPECIFIED: ICD-10-CM

## 2018-05-24 DIAGNOSIS — Z23 NEED FOR PNEUMOCOCCAL VACCINATION: ICD-10-CM

## 2018-05-24 DIAGNOSIS — Z23 NEED FOR HPV VACCINATION: ICD-10-CM

## 2018-05-24 DIAGNOSIS — Z13.1 DIABETES MELLITUS SCREENING: ICD-10-CM

## 2018-05-24 DIAGNOSIS — Z13.220 LIPID SCREENING: ICD-10-CM

## 2018-05-24 DIAGNOSIS — G43.909 MIGRAINE WITHOUT STATUS MIGRAINOSUS, NOT INTRACTABLE, UNSPECIFIED MIGRAINE TYPE: ICD-10-CM

## 2018-05-24 DIAGNOSIS — F41.9 ANXIETY: Primary | ICD-10-CM

## 2018-05-24 DIAGNOSIS — F40.01 AGORAPHOBIA WITH PANIC ATTACKS: ICD-10-CM

## 2018-05-24 PROCEDURE — 99999 PR PBB SHADOW E&M-EST. PATIENT-LVL III: CPT | Mod: PBBFAC,,, | Performed by: FAMILY MEDICINE

## 2018-05-24 PROCEDURE — 3008F BODY MASS INDEX DOCD: CPT | Mod: CPTII,S$GLB,, | Performed by: FAMILY MEDICINE

## 2018-05-24 PROCEDURE — 99214 OFFICE O/P EST MOD 30 MIN: CPT | Mod: 25,S$GLB,, | Performed by: FAMILY MEDICINE

## 2018-05-24 PROCEDURE — 90651 9VHPV VACCINE 2/3 DOSE IM: CPT | Mod: S$GLB,,, | Performed by: FAMILY MEDICINE

## 2018-05-24 PROCEDURE — 90472 IMMUNIZATION ADMIN EACH ADD: CPT | Mod: S$GLB,,, | Performed by: FAMILY MEDICINE

## 2018-05-24 PROCEDURE — 90471 IMMUNIZATION ADMIN: CPT | Mod: S$GLB,,, | Performed by: FAMILY MEDICINE

## 2018-05-24 PROCEDURE — 90732 PPSV23 VACC 2 YRS+ SUBQ/IM: CPT | Mod: S$GLB,,, | Performed by: FAMILY MEDICINE

## 2018-05-24 RX ORDER — BUTALBITAL, ACETAMINOPHEN AND CAFFEINE 50; 325; 40 MG/1; MG/1; MG/1
1-2 TABLET ORAL EVERY 6 HOURS PRN
Qty: 20 TABLET | Refills: 0 | Status: SHIPPED | OUTPATIENT
Start: 2018-05-24 | End: 2018-08-03

## 2018-05-24 RX ORDER — HYDROGEN PEROXIDE 3 %
20 SOLUTION, NON-ORAL MISCELLANEOUS
COMMUNITY
End: 2018-05-24 | Stop reason: SDUPTHER

## 2018-05-24 RX ORDER — ACETAMINOPHEN 500 MG
500 TABLET ORAL EVERY 6 HOURS PRN
COMMUNITY
End: 2019-05-09

## 2018-05-24 RX ORDER — HYDROGEN PEROXIDE 3 %
20 SOLUTION, NON-ORAL MISCELLANEOUS DAILY
Qty: 30 CAPSULE | Refills: 11 | Status: SHIPPED | OUTPATIENT
Start: 2018-05-24 | End: 2019-05-01 | Stop reason: SDUPTHER
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
## 2018-06-08 ENCOUNTER — LAB VISIT (OUTPATIENT)
Dept: LAB | Facility: HOSPITAL | Age: 23
End: 2018-06-08
Attending: FAMILY MEDICINE
Payer: COMMERCIAL

## 2018-06-08 DIAGNOSIS — Z13.1 DIABETES MELLITUS SCREENING: ICD-10-CM

## 2018-06-08 DIAGNOSIS — K21.9 GASTROESOPHAGEAL REFLUX DISEASE, ESOPHAGITIS PRESENCE NOT SPECIFIED: ICD-10-CM

## 2018-06-08 DIAGNOSIS — Z13.220 LIPID SCREENING: ICD-10-CM

## 2018-06-08 LAB
ANION GAP SERPL CALC-SCNC: 15 MMOL/L
BUN SERPL-MCNC: 9 MG/DL
CALCIUM SERPL-MCNC: 9.8 MG/DL
CHLORIDE SERPL-SCNC: 106 MMOL/L
CHOLEST SERPL-MCNC: 177 MG/DL
CHOLEST/HDLC SERPL: 3.3 {RATIO}
CO2 SERPL-SCNC: 18 MMOL/L
CREAT SERPL-MCNC: 0.8 MG/DL
EST. GFR  (AFRICAN AMERICAN): >60 ML/MIN/1.73 M^2
EST. GFR  (NON AFRICAN AMERICAN): >60 ML/MIN/1.73 M^2
GLUCOSE SERPL-MCNC: 95 MG/DL
HDLC SERPL-MCNC: 53 MG/DL
HDLC SERPL: 29.9 %
LDLC SERPL CALC-MCNC: 111 MG/DL
NONHDLC SERPL-MCNC: 124 MG/DL
POTASSIUM SERPL-SCNC: 4.4 MMOL/L
SODIUM SERPL-SCNC: 139 MMOL/L
TRIGL SERPL-MCNC: 65 MG/DL

## 2018-06-08 PROCEDURE — 80048 BASIC METABOLIC PNL TOTAL CA: CPT

## 2018-06-08 PROCEDURE — 36415 COLL VENOUS BLD VENIPUNCTURE: CPT | Mod: PO

## 2018-06-08 PROCEDURE — 80061 LIPID PANEL: CPT

## 2018-06-15 ENCOUNTER — TELEPHONE (OUTPATIENT)
Dept: FAMILY MEDICINE | Facility: CLINIC | Age: 23
End: 2018-06-15

## 2018-06-15 RX ORDER — NORGESTIMATE AND ETHINYL ESTRADIOL 7DAYSX3 LO
KIT ORAL
Qty: 28 TABLET | Refills: 3 | Status: SHIPPED | OUTPATIENT
Start: 2018-06-15 | End: 2018-06-22 | Stop reason: SDUPTHER

## 2018-06-19 ENCOUNTER — OFFICE VISIT (OUTPATIENT)
Dept: FAMILY MEDICINE | Facility: CLINIC | Age: 23
End: 2018-06-19
Payer: COMMERCIAL

## 2018-06-19 VITALS
DIASTOLIC BLOOD PRESSURE: 75 MMHG | HEART RATE: 80 BPM | TEMPERATURE: 98 F | WEIGHT: 222 LBS | BODY MASS INDEX: 39.34 KG/M2 | HEIGHT: 63 IN | SYSTOLIC BLOOD PRESSURE: 114 MMHG
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
## 2018-06-19 PROCEDURE — 99999 PR PBB SHADOW E&M-EST. PATIENT-LVL IV: CPT | Mod: PBBFAC,,, | Performed by: NURSE PRACTITIONER

## 2018-06-19 PROCEDURE — 99213 OFFICE O/P EST LOW 20 MIN: CPT | Mod: S$GLB,,, | Performed by: NURSE PRACTITIONER

## 2018-06-19 RX ORDER — AMOXICILLIN AND CLAVULANATE POTASSIUM 875; 125 MG/1; MG/1
1 TABLET, FILM COATED ORAL EVERY 12 HOURS
Qty: 14 TABLET | Refills: 0 | Status: SHIPPED | OUTPATIENT
Start: 2018-06-19 | End: 2018-06-26

## 2018-06-19 RX ORDER — MUPIROCIN 20 MG/G
OINTMENT TOPICAL 3 TIMES DAILY
Qty: 1 TUBE | Refills: 0 | Status: SHIPPED | OUTPATIENT
Start: 2018-06-19 | End: 2018-06-29

## 2018-06-19 NOTE — PROGRESS NOTES
Subjective:       Patient ID: Tomi Ervin is a 23 y.o. female.    Chief Complaint: Animal Bite    Animal Bite    The incident occurred more than 2 days ago (Cat bite). There is an injury to the left forearm. The pain is mild. It is unlikely that a foreign body is present. Pertinent negatives include no chest pain, no fussiness, no numbness, no visual disturbance, no abdominal pain, no bowel incontinence, no nausea, no vomiting, no bladder incontinence, no headaches, no hearing loss, no inability to bear weight, no neck pain, no pain when bearing weight, no focal weakness, no decreased responsiveness, no light-headedness, no loss of consciousness, no seizures, no tingling, no weakness, no cough, no difficulty breathing and no memory loss. There have been no prior injuries to these areas. She is right-handed. Her tetanus status is UTD. She has been behaving normally. There were no sick contacts. She has received no recent medical care (Tdap UTD).     Past Medical History:   Diagnosis Date    Agoraphobia with panic attacks 1/16/2018    Anxiety 1/16/2018    Fibromyalgia     GERD (gastroesophageal reflux disease)     Migraines     Obesity (BMI 35.0-39.9 without comorbidity)      Social History     Social History    Marital status: Single     Spouse name: N/A    Number of children: N/A    Years of education: N/A     Occupational History    Not on file.     Social History Main Topics    Smoking status: Current Every Day Smoker     Packs/day: 0.50     Start date: 6/3/2012    Smokeless tobacco: Not on file    Alcohol use 0.6 oz/week     1 Glasses of wine per week    Drug use: Unknown    Sexual activity: Not on file     Social History Narrative    No narrative on file     Past Surgical History:   Procedure Laterality Date    TONSILLECTOMY         Review of Systems   Constitutional: Negative.  Negative for activity change, decreased responsiveness and unexpected weight change.   HENT: Negative.   Negative for hearing loss, rhinorrhea and trouble swallowing.    Eyes: Negative.  Negative for discharge and visual disturbance.   Respiratory: Negative.  Negative for cough, chest tightness and wheezing.    Cardiovascular: Negative.  Negative for chest pain and palpitations.   Gastrointestinal: Negative.  Negative for abdominal pain, blood in stool, bowel incontinence, constipation, diarrhea, nausea and vomiting.   Endocrine: Negative.  Negative for polydipsia and polyuria.   Genitourinary: Negative.  Negative for bladder incontinence, difficulty urinating, dysuria, hematuria and menstrual problem.   Musculoskeletal: Negative.  Negative for arthralgias, joint swelling and neck pain.   Skin: Positive for wound (Cat bite to left forearm).   Allergic/Immunologic: Negative.    Neurological: Negative.  Negative for tingling, focal weakness, seizures, loss of consciousness, weakness, light-headedness, numbness and headaches.   Psychiatric/Behavioral: Negative.  Negative for confusion, dysphoric mood and memory loss.       Objective:      Physical Exam   Constitutional: She is oriented to person, place, and time. She appears well-developed and well-nourished.   HENT:   Head: Normocephalic.   Right Ear: External ear normal.   Left Ear: External ear normal.   Nose: Nose normal.   Mouth/Throat: Oropharynx is clear and moist.   Eyes: Conjunctivae are normal. Pupils are equal, round, and reactive to light.   Neck: Normal range of motion. Neck supple.   Cardiovascular: Normal rate, regular rhythm and normal heart sounds.    Pulmonary/Chest: Effort normal and breath sounds normal.   Abdominal: Soft. Bowel sounds are normal.   Musculoskeletal: Normal range of motion.   Neurological: She is alert and oriented to person, place, and time.   Skin: Skin is warm and dry. Capillary refill takes 2 to 3 seconds.        Psychiatric: She has a normal mood and affect. Her behavior is normal. Judgment and thought content normal.   Nursing  note and vitals reviewed.      Assessment:       1. Cat bite, subsequent encounter        Plan:           Tomi was seen today for animal bite.    Diagnoses and all orders for this visit:    Cat bite, subsequent encounter  -     amoxicillin-clavulanate 875-125mg (AUGMENTIN) 875-125 mg per tablet; Take 1 tablet by mouth every 12 (twelve) hours.  -     mupirocin (BACTROBAN) 2 % ointment; Apply topically 3 (three) times daily.        Keep area clean and dry        Clean with mild soap and water        Report to ER immediately if symptoms worsen

## 2018-06-21 ENCOUNTER — PATIENT OUTREACH (OUTPATIENT)
Dept: ADMINISTRATIVE | Facility: HOSPITAL | Age: 23
End: 2018-06-21

## 2018-06-22 ENCOUNTER — OFFICE VISIT (OUTPATIENT)
Dept: FAMILY MEDICINE | Facility: CLINIC | Age: 23
End: 2018-06-22
Payer: COMMERCIAL

## 2018-06-22 VITALS
BODY MASS INDEX: 40.48 KG/M2 | TEMPERATURE: 99 F | HEIGHT: 62 IN | SYSTOLIC BLOOD PRESSURE: 111 MMHG | HEART RATE: 82 BPM | WEIGHT: 220 LBS | DIASTOLIC BLOOD PRESSURE: 72 MMHG

## 2018-06-22 DIAGNOSIS — Z76.0 MEDICATION REFILL: ICD-10-CM

## 2018-06-22 DIAGNOSIS — Z00.00 WELL WOMAN EXAM WITHOUT GYNECOLOGICAL EXAM: Primary | ICD-10-CM

## 2018-06-22 LAB — B-HCG UR QL: NEGATIVE

## 2018-06-22 PROCEDURE — 99999 PR PBB SHADOW E&M-EST. PATIENT-LVL IV: CPT | Mod: PBBFAC,,, | Performed by: NURSE PRACTITIONER

## 2018-06-22 PROCEDURE — 88175 CYTOPATH C/V AUTO FLUID REDO: CPT

## 2018-06-22 PROCEDURE — 81025 URINE PREGNANCY TEST: CPT | Mod: PO

## 2018-06-22 PROCEDURE — 99213 OFFICE O/P EST LOW 20 MIN: CPT | Mod: S$GLB,,, | Performed by: NURSE PRACTITIONER

## 2018-06-22 RX ORDER — NORGESTIMATE AND ETHINYL ESTRADIOL 7DAYSX3 LO
1 KIT ORAL DAILY
Qty: 28 TABLET | Refills: 11 | Status: SHIPPED | OUTPATIENT
Start: 2018-06-22 | End: 2018-08-03

## 2018-06-22 NOTE — PROGRESS NOTES
Subjective:       Patient ID: Tomi Ervin is a 23 y.o. female.    Chief Complaint: Well Woman    Gynecologic Exam   The patient's pertinent negatives include no genital itching, genital lesions, genital odor, genital rash, missed menses, pelvic pain, vaginal bleeding or vaginal discharge. Primary symptoms comment: Pt in today for routine well woman exam. The patient is experiencing no pain. She is not pregnant. Pertinent negatives include no abdominal pain, anorexia, back pain, chills, constipation, diarrhea, discolored urine, dysuria, fever, flank pain, frequency, headaches, hematuria, joint pain, joint swelling, nausea, painful intercourse, rash, sore throat, urgency or vomiting. Nothing aggravates the symptoms. The treatment provided no relief. She is sexually active. No, her partner does not have an STD. She uses oral contraceptives for contraception. Her menstrual history has been regular. There is no history of an abdominal surgery, a  section, an ectopic pregnancy, endometriosis, a gynecological surgery, herpes simplex, menorrhagia, metrorrhagia, miscarriage, ovarian cysts, perineal abscess, PID, an STD, a terminated pregnancy or vaginosis.     Past Medical History:   Diagnosis Date    Agoraphobia with panic attacks 2018    Anxiety 2018    Fibromyalgia     GERD (gastroesophageal reflux disease)     Migraines     Obesity (BMI 35.0-39.9 without comorbidity)      Social History     Social History    Marital status: Single     Spouse name: N/A    Number of children: N/A    Years of education: N/A     Occupational History    Not on file.     Social History Main Topics    Smoking status: Current Every Day Smoker     Packs/day: 0.50     Start date: 6/3/2012    Smokeless tobacco: Not on file    Alcohol use 0.6 oz/week     1 Glasses of wine per week    Drug use: Unknown    Sexual activity: Not on file     Social History Narrative    No narrative on file     Past Surgical  History:   Procedure Laterality Date    TONSILLECTOMY         Review of Systems   Constitutional: Negative.  Negative for chills and fever.   HENT: Negative.  Negative for sore throat.    Eyes: Negative.    Respiratory: Negative.    Cardiovascular: Negative.    Gastrointestinal: Negative.  Negative for abdominal pain, anorexia, constipation, diarrhea, nausea and vomiting.   Endocrine: Negative.    Genitourinary: Negative.  Negative for dysuria, flank pain, frequency, hematuria, menorrhagia, missed menses, pelvic pain, urgency and vaginal discharge.   Musculoskeletal: Negative.  Negative for back pain and joint pain.   Skin: Negative.  Negative for rash.   Allergic/Immunologic: Negative.    Neurological: Negative.  Negative for headaches.   Psychiatric/Behavioral: Negative.        Objective:      Physical Exam   Constitutional: She is oriented to person, place, and time. She appears well-developed and well-nourished.   HENT:   Head: Normocephalic.   Right Ear: External ear normal.   Left Ear: External ear normal.   Nose: Nose normal.   Mouth/Throat: Oropharynx is clear and moist.   Eyes: Conjunctivae are normal. Pupils are equal, round, and reactive to light.   Neck: Normal range of motion. Neck supple.   Cardiovascular: Normal rate, regular rhythm and normal heart sounds.    Pulmonary/Chest: Effort normal and breath sounds normal.   Abdominal: Soft. Bowel sounds are normal.   Musculoskeletal: Normal range of motion.   Neurological: She is alert and oriented to person, place, and time.   Skin: Skin is warm and dry. Capillary refill takes 2 to 3 seconds.   Psychiatric: She has a normal mood and affect. Her behavior is normal. Judgment and thought content normal.   Nursing note and vitals reviewed.      Assessment:       1. Well woman exam without gynecological exam    2. Medication refill        Plan:           Tomi was seen today for well woman.    Diagnoses and all orders for this visit:    Well woman exam without  gynecological exam  -     Pregnancy, urine rapid  -     Liquid-based pap smear, screening    Medication refill  -     norgestimate-ethinyl estradiol (TRI-LO-SPRINTEC) 0.18/0.215/0.25 mg-25 mcg tablet; Take 1 tablet by mouth once daily.

## 2018-06-25 ENCOUNTER — NURSE TRIAGE (OUTPATIENT)
Dept: ADMINISTRATIVE | Facility: CLINIC | Age: 23
End: 2018-06-25
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
## 2018-07-10 ENCOUNTER — OFFICE VISIT (OUTPATIENT)
Dept: FAMILY MEDICINE | Facility: CLINIC | Age: 23
End: 2018-07-10
Payer: COMMERCIAL

## 2018-07-10 ENCOUNTER — LAB VISIT (OUTPATIENT)
Dept: LAB | Facility: HOSPITAL | Age: 23
End: 2018-07-10
Attending: FAMILY MEDICINE
Payer: COMMERCIAL

## 2018-07-10 VITALS
SYSTOLIC BLOOD PRESSURE: 121 MMHG | TEMPERATURE: 98 F | HEIGHT: 62 IN | WEIGHT: 220 LBS | DIASTOLIC BLOOD PRESSURE: 83 MMHG | HEART RATE: 83 BPM | BODY MASS INDEX: 40.48 KG/M2

## 2018-07-10 DIAGNOSIS — F41.9 ANXIETY: ICD-10-CM

## 2018-07-10 DIAGNOSIS — R32 URINARY INCONTINENCE, UNSPECIFIED TYPE: ICD-10-CM

## 2018-07-10 DIAGNOSIS — R20.2 PARESTHESIAS: ICD-10-CM

## 2018-07-10 DIAGNOSIS — G43.909 MIGRAINE WITHOUT STATUS MIGRAINOSUS, NOT INTRACTABLE, UNSPECIFIED MIGRAINE TYPE: ICD-10-CM

## 2018-07-10 DIAGNOSIS — R53.83 FATIGUE, UNSPECIFIED TYPE: ICD-10-CM

## 2018-07-10 DIAGNOSIS — M79.7 FIBROMYALGIA: ICD-10-CM

## 2018-07-10 DIAGNOSIS — F40.01 AGORAPHOBIA WITH PANIC ATTACKS: ICD-10-CM

## 2018-07-10 DIAGNOSIS — M79.7 FIBROMYALGIA: Primary | ICD-10-CM

## 2018-07-10 DIAGNOSIS — E66.01 MORBID OBESITY: ICD-10-CM

## 2018-07-10 LAB
ALBUMIN SERPL BCP-MCNC: 3.7 G/DL
ALP SERPL-CCNC: 104 U/L
ALT SERPL W/O P-5'-P-CCNC: 27 U/L
ANION GAP SERPL CALC-SCNC: 13 MMOL/L
AST SERPL-CCNC: 24 U/L
BASOPHILS # BLD AUTO: 0.07 K/UL
BASOPHILS NFR BLD: 0.7 %
BILIRUB SERPL-MCNC: 0.4 MG/DL
BILIRUB UR QL STRIP: NEGATIVE
BUN SERPL-MCNC: 9 MG/DL
CALCIUM SERPL-MCNC: 9.9 MG/DL
CCP AB SER IA-ACNC: <0.5 U/ML
CHLORIDE SERPL-SCNC: 103 MMOL/L
CLARITY UR: ABNORMAL
CO2 SERPL-SCNC: 23 MMOL/L
COLOR UR: YELLOW
CREAT SERPL-MCNC: 0.7 MG/DL
CREAT UR-MCNC: 135 MG/DL
CRP SERPL-MCNC: 19.7 MG/L
DIFFERENTIAL METHOD: ABNORMAL
EOSINOPHIL # BLD AUTO: 1.3 K/UL
EOSINOPHIL NFR BLD: 13.3 %
ERYTHROCYTE [DISTWIDTH] IN BLOOD BY AUTOMATED COUNT: 12.9 %
ERYTHROCYTE [SEDIMENTATION RATE] IN BLOOD BY WESTERGREN METHOD: 12 MM/HR
EST. GFR  (AFRICAN AMERICAN): >60 ML/MIN/1.73 M^2
EST. GFR  (NON AFRICAN AMERICAN): >60 ML/MIN/1.73 M^2
FOLATE SERPL-MCNC: 9.8 NG/ML
GLUCOSE SERPL-MCNC: 87 MG/DL
GLUCOSE UR QL STRIP: NEGATIVE
HCT VFR BLD AUTO: 39.8 %
HGB BLD-MCNC: 12.1 G/DL
HGB UR QL STRIP: NEGATIVE
IMM GRANULOCYTES # BLD AUTO: 0.05 K/UL
IMM GRANULOCYTES NFR BLD AUTO: 0.5 %
KETONES UR QL STRIP: NEGATIVE
LEUKOCYTE ESTERASE UR QL STRIP: NEGATIVE
LYMPHOCYTES # BLD AUTO: 1.8 K/UL
LYMPHOCYTES NFR BLD: 17.8 %
MCH RBC QN AUTO: 26.5 PG
MCHC RBC AUTO-ENTMCNC: 30.4 G/DL
MCV RBC AUTO: 87 FL
MONOCYTES # BLD AUTO: 0.6 K/UL
MONOCYTES NFR BLD: 6.2 %
NEUTROPHILS # BLD AUTO: 6.1 K/UL
NEUTROPHILS NFR BLD: 61.5 %
NITRITE UR QL STRIP: NEGATIVE
NRBC BLD-RTO: 0 /100 WBC
PH UR STRIP: 7.5 [PH] (ref 5–8)
PLATELET # BLD AUTO: 315 K/UL
PMV BLD AUTO: 11.3 FL
POTASSIUM SERPL-SCNC: 3.8 MMOL/L
PROT SERPL-MCNC: 7.5 G/DL
PROT UR QL STRIP: NEGATIVE
PROT UR-MCNC: 8 MG/DL
PROT/CREAT RATIO, UR: 0.06
RBC # BLD AUTO: 4.56 M/UL
RHEUMATOID FACT SERPL-ACNC: <10 IU/ML
SODIUM SERPL-SCNC: 139 MMOL/L
SP GR UR STRIP: 1.01 (ref 1–1.03)
TSH SERPL DL<=0.005 MIU/L-ACNC: 2.07 UIU/ML
URN SPEC COLLECT METH UR: ABNORMAL
VIT B12 SERPL-MCNC: 262 PG/ML
WBC # BLD AUTO: 9.95 K/UL

## 2018-07-10 PROCEDURE — 36415 COLL VENOUS BLD VENIPUNCTURE: CPT | Mod: PO

## 2018-07-10 PROCEDURE — 80053 COMPREHEN METABOLIC PANEL: CPT

## 2018-07-10 PROCEDURE — 86235 NUCLEAR ANTIGEN ANTIBODY: CPT

## 2018-07-10 PROCEDURE — 84156 ASSAY OF PROTEIN URINE: CPT

## 2018-07-10 PROCEDURE — 84443 ASSAY THYROID STIM HORMONE: CPT

## 2018-07-10 PROCEDURE — 86038 ANTINUCLEAR ANTIBODIES: CPT

## 2018-07-10 PROCEDURE — 99214 OFFICE O/P EST MOD 30 MIN: CPT | Mod: S$GLB,,, | Performed by: FAMILY MEDICINE

## 2018-07-10 PROCEDURE — 3008F BODY MASS INDEX DOCD: CPT | Mod: CPTII,S$GLB,, | Performed by: FAMILY MEDICINE

## 2018-07-10 PROCEDURE — 86431 RHEUMATOID FACTOR QUANT: CPT

## 2018-07-10 PROCEDURE — 99999 PR PBB SHADOW E&M-EST. PATIENT-LVL V: CPT | Mod: PBBFAC,,, | Performed by: FAMILY MEDICINE

## 2018-07-10 PROCEDURE — 85025 COMPLETE CBC W/AUTO DIFF WBC: CPT

## 2018-07-10 PROCEDURE — 86200 CCP ANTIBODY: CPT

## 2018-07-10 PROCEDURE — 81002 URINALYSIS NONAUTO W/O SCOPE: CPT | Mod: PO

## 2018-07-10 PROCEDURE — 82746 ASSAY OF FOLIC ACID SERUM: CPT

## 2018-07-10 PROCEDURE — 82607 VITAMIN B-12: CPT

## 2018-07-10 PROCEDURE — 86803 HEPATITIS C AB TEST: CPT

## 2018-07-10 PROCEDURE — 85651 RBC SED RATE NONAUTOMATED: CPT | Mod: PO

## 2018-07-10 PROCEDURE — 86140 C-REACTIVE PROTEIN: CPT

## 2018-07-10 RX ORDER — IBUPROFEN 600 MG/1
TABLET ORAL
Refills: 0 | COMMUNITY
Start: 2018-06-25 | End: 2018-08-03

## 2018-07-10 RX ORDER — METHOCARBAMOL 750 MG/1
TABLET, FILM COATED ORAL
Refills: 0 | COMMUNITY
Start: 2018-06-25 | End: 2018-07-10

## 2018-07-10 RX ORDER — METHOCARBAMOL 500 MG/1
500 TABLET, FILM COATED ORAL NIGHTLY PRN
Qty: 30 TABLET | Refills: 2 | Status: SHIPPED | OUTPATIENT
Start: 2018-07-10 | End: 2018-08-03
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
## 2018-07-11 LAB
ANA SER QL IF: NORMAL
HCV AB SERPL QL IA: NEGATIVE

## 2018-07-12 LAB
ANTI-SSA ANTIBODY: 1.68 EU
ANTI-SSA INTERPRETATION: NEGATIVE

## 2018-07-13 ENCOUNTER — TELEPHONE (OUTPATIENT)
Dept: RHEUMATOLOGY | Facility: CLINIC | Age: 23
End: 2018-07-13

## 2018-07-13 ENCOUNTER — TELEPHONE (OUTPATIENT)
Dept: FAMILY MEDICINE | Facility: CLINIC | Age: 23
End: 2018-07-13

## 2018-07-13 ENCOUNTER — TELEPHONE (OUTPATIENT)
Dept: NEUROLOGY | Facility: CLINIC | Age: 23
End: 2018-07-13

## 2018-07-13 DIAGNOSIS — D72.10 INCREASED EOSINOPHILS IN THE BLOOD: ICD-10-CM

## 2018-07-13 DIAGNOSIS — R79.82 ELEVATED C-REACTIVE PROTEIN (CRP): ICD-10-CM

## 2018-07-13 DIAGNOSIS — E53.8 VITAMIN B 12 DEFICIENCY: Primary | ICD-10-CM

## 2018-07-13 NOTE — TELEPHONE ENCOUNTER
Returned patient's call. No answer. Scheduled new patient referral. Left appt info on voicemail. No sooner availabilities for new patient appt. January 2019 is the soonest. Placed on waiting list.

## 2018-07-13 NOTE — TELEPHONE ENCOUNTER
----- Message from Claudine Wood sent at 7/13/2018 11:18 AM CDT -----  Contact: [phone]  Patient is returning nurse's phone call.  Please call patient back at [phone].

## 2018-07-13 NOTE — TELEPHONE ENCOUNTER
----- Message from Julio Cabrera MD sent at 7/12/2018  4:54 PM CDT -----  B12 level on the low end of normal.  She has a few extra eosinophils which sometimes can be seen with inflammation, infections or other conditions.  One of the tests for inflammation was mildly elevated.  Remainder of the laboratory looks okay.  Recommend a follow-up with rheumatologist and neurologist as discussed at appointment.  Please make sure she has appointments arranged.  Recommend repeat CBC, B12, methylmalonic acid, homocystine level in 1 month.  I would not recommend starting any B12 supplementation until we recheck the blood work. My nurse will contact you to arrange.  Thanks,  Dr. Cabrera

## 2018-07-13 NOTE — TELEPHONE ENCOUNTER
----- Message from Zachary Lux sent at 7/13/2018  9:27 AM CDT -----  Contact: patient  Type: Needs Medical Advice    Who Called:  Patient  Symptoms (please be specific):    How long has patient had these symptoms:    Pharmacy name and phone #:    Best Call Back Number: [phone]  Additional Information: requesting a call back for appointment, referral in [facility]

## 2018-07-13 NOTE — TELEPHONE ENCOUNTER
----- Message from Zachary Lux sent at 7/13/2018  9:23 AM CDT -----  Contact: patient  Type: Needs Medical Advice    Who Called:  Patient  Symptoms (please be specific):    How long has patient had these symptoms:    Pharmacy name and phone #:    Best Call Back Number: [phone]  Additional Information: requesting a call back for appointment, referral in [facility]

## 2018-07-19 ENCOUNTER — OFFICE VISIT (OUTPATIENT)
Dept: OBSTETRICS AND GYNECOLOGY | Facility: CLINIC | Age: 23
End: 2018-07-19
Payer: COMMERCIAL

## 2018-07-19 ENCOUNTER — TELEPHONE (OUTPATIENT)
Dept: OBSTETRICS AND GYNECOLOGY | Facility: CLINIC | Age: 23
End: 2018-07-19

## 2018-07-19 VITALS
DIASTOLIC BLOOD PRESSURE: 84 MMHG | SYSTOLIC BLOOD PRESSURE: 130 MMHG | HEIGHT: 62 IN | WEIGHT: 220.88 LBS | BODY MASS INDEX: 40.65 KG/M2

## 2018-07-19 DIAGNOSIS — R35.1 NOCTURIA: ICD-10-CM

## 2018-07-19 DIAGNOSIS — R35.0 URINARY FREQUENCY: Primary | ICD-10-CM

## 2018-07-19 DIAGNOSIS — N39.46 MIXED INCONTINENCE: ICD-10-CM

## 2018-07-19 PROCEDURE — 99999 PR PBB SHADOW E&M-EST. PATIENT-LVL IV: CPT | Mod: PBBFAC,,, | Performed by: NURSE PRACTITIONER

## 2018-07-19 PROCEDURE — 3008F BODY MASS INDEX DOCD: CPT | Mod: CPTII,S$GLB,, | Performed by: NURSE PRACTITIONER

## 2018-07-19 PROCEDURE — 99203 OFFICE O/P NEW LOW 30 MIN: CPT | Mod: S$GLB,,, | Performed by: NURSE PRACTITIONER

## 2018-07-19 NOTE — TELEPHONE ENCOUNTER
Pt was seen by RUPINDER Mejia in OB for urinary incontinence and recurrent UTIs. We have referred her for pelvic floor physical therapy but Amina also wants her to see Urology. Can someone assist me with an appt sooner than 10/31/18 if possible.

## 2018-07-19 NOTE — PROGRESS NOTES
"CC: Urinary frequency and nocturia.    Tomi Ervin is a 23 y.o. female  presents for c/o nocturia ( 3 times.nightly), mixed incontinent and daytime frequency. Patient states that she has had urinary symptoms  for over three years.No pelvic pain, dysuria, hematuria or flank pain. Patient " was told she had a kidney stone, yrs ago". No h/o recurrent UTIs. Has a h/o Pyelonephritis.  Is sexually active. Urine in clinic is normal. Patient drinks water and  coke zero. Patient is a smoker. Last pap exam was normal, .  LMP: Patient's last menstrual period was 2018..       Past Medical History:   Diagnosis Date    Agoraphobia with panic attacks 2018    Anxiety 2018    Fibromyalgia     GERD (gastroesophageal reflux disease)     Migraines     Obesity (BMI 35.0-39.9 without comorbidity)     Pyelonephritis     Urticaria     lasted 3 months resolved     Past Surgical History:   Procedure Laterality Date    TONSILLECTOMY       Social History     Social History    Marital status: Single     Spouse name: N/A    Number of children: N/A    Years of education: N/A     Occupational History    Not on file.     Social History Main Topics    Smoking status: Current Every Day Smoker     Packs/day: 0.50     Start date: 6/3/2012    Smokeless tobacco: Not on file    Alcohol use 0.6 oz/week     1 Glasses of wine per week    Drug use: No    Sexual activity: Yes     Partners: Male     Birth control/ protection: Pill     Other Topics Concern    Not on file     Social History Narrative    No narrative on file     Family History   Problem Relation Age of Onset    Diabetes Mother     Mental illness Mother         gambling problem    Uterine cancer Paternal Grandmother     Cancer Paternal Grandmother     Mental illness Sister         borderline personality disorder     OB History      Para Term  AB Living    2 1 1   1 1    SAB TAB Ectopic Multiple Live Births    1       " "1          /84   Ht 5' 2" (1.575 m)   Wt 100.2 kg (220 lb 14.4 oz)   LMP 07/04/2018   BMI 40.40 kg/m²       ROS:  GENERAL: Denies weight gain or weight loss. Feeling well overall.   SKIN: Denies rash or lesions.   HEAD: Denies head injury or headache.   NODES: Denies enlarged lymph nodes.   CHEST: Denies chest pain or shortness of breath.   CARDIOVASCULAR: Denies palpitations or left sided chest pain.   ABDOMEN: No abdominal pain, constipation, diarrhea, nausea, vomiting or rectal bleeding.   URINARY: HPI  REPRODUCTIVE: See HPI.       PHYSICAL EXAM:  APPEARANCE: Obese female., in no acute distress.  AFFECT: WNL, alert and oriented x 3    .    1. Urinary frequency  Ambulatory consult to Urology    Ambulatory Referral to Physical/Occupational Therapy   2. Nocturia  Ambulatory consult to Urology    Ambulatory Referral to Physical/Occupational Therapy   3. Mixed incontinence  Ambulatory consult to Urology    Ambulatory Referral to Physical/Occupational Therapy    PLAN:  Patient was referred for PT pelvic floor training  Bladder matters book given  Patient up-to-date with GYN exams and visits ( exam deferred based on this information and negative GYN ROS).   Urology referral after PT           Patient was counseled today on A.C.S. Pap guidelines and recommendations for yearly pelvic exams, mammograms and monthly self breast exams; to see her PCP for other health maintenance.                   "

## 2018-07-19 NOTE — LETTER
July 19, 2018      Julio Cabrera MD  [id number] [facility] [id number]           [facility]  [address] [facility] [address]  Phone: [phone]  Fax: [phone]          Patient: Tomi Ervin   MR Number: [id number]   YOB: 1995   Date of Visit: 7/19/2018       Dear Dr. Julio Cabrera:    Thank you for referring Tomi Ervin to me for evaluation. Attached you will find relevant portions of my assessment and plan of care.    If you have questions, please do not hesitate to call me. I look forward to following Tomi Ervin along with you.    Sincerely,    Amina Mejia NP    Enclosure  CC:  No Recipients    If you would like to receive this communication electronically, please contact [email or web address] or [phone] to request more information on [employer] access.    For providers and/or their staff who would like to refer a patient to [facility], please contact us through our one-stop-shop provider referral line, Germaine Chao, at [phone].    If you feel you have received this communication in error or would no longer like to receive these types of communications, please e-mail [email or web address]

## 2018-07-30 ENCOUNTER — OFFICE VISIT (OUTPATIENT)
Dept: FAMILY MEDICINE | Facility: CLINIC | Age: 23
End: 2018-07-30
Payer: COMMERCIAL

## 2018-07-30 VITALS
HEIGHT: 63 IN | BODY MASS INDEX: 38.8 KG/M2 | TEMPERATURE: 98 F | HEART RATE: 75 BPM | DIASTOLIC BLOOD PRESSURE: 67 MMHG | SYSTOLIC BLOOD PRESSURE: 106 MMHG | WEIGHT: 219 LBS

## 2018-07-30 DIAGNOSIS — R21 RASH: Primary | ICD-10-CM

## 2018-07-30 PROCEDURE — 99999 PR PBB SHADOW E&M-EST. PATIENT-LVL IV: CPT | Mod: PBBFAC,,, | Performed by: NURSE PRACTITIONER

## 2018-07-30 PROCEDURE — 99213 OFFICE O/P EST LOW 20 MIN: CPT | Mod: S$GLB,,, | Performed by: NURSE PRACTITIONER

## 2018-07-30 PROCEDURE — 3008F BODY MASS INDEX DOCD: CPT | Mod: CPTII,S$GLB,, | Performed by: NURSE PRACTITIONER

## 2018-07-30 RX ORDER — TRIAMCINOLONE ACETONIDE 5 MG/G
CREAM TOPICAL 2 TIMES DAILY
Qty: 30 G | Refills: 0 | Status: SHIPPED | OUTPATIENT
Start: 2018-07-30 | End: 2018-08-03

## 2018-07-30 NOTE — PROGRESS NOTES
Subjective:       Patient ID: Tomi Ervin is a 23 y.o. female.    Chief Complaint: Rash    Rash   This is a new problem. The current episode started yesterday. The problem has been waxing and waning since onset. The affected locations include theright arm and right elbow. Location: Right upper arm, right thigh. The rash is characterized by burning, redness and itchiness. She was exposed to shellfish and unknown. Pertinent negatives include no anorexia, congestion, cough, diarrhea, eye pain, facial edema, fatigue, fever, joint pain, nail changes, rhinorrhea, shortness of breath, sore throat or vomiting. Past treatments include nothing. The treatment provided no relief. There is no history of allergies, asthma, eczema or varicella.     Past Medical History:   Diagnosis Date    Agoraphobia with panic attacks 1/16/2018    Anxiety 1/16/2018    Fibromyalgia     GERD (gastroesophageal reflux disease)     Migraines     Obesity (BMI 35.0-39.9 without comorbidity)     Pyelonephritis 2011    Urticaria 2008    lasted 3 months resolved     Social History     Social History    Marital status: Single     Spouse name: N/A    Number of children: N/A    Years of education: N/A     Occupational History    Not on file.     Social History Main Topics    Smoking status: Current Every Day Smoker     Packs/day: 0.50     Start date: 6/3/2012    Smokeless tobacco: Not on file    Alcohol use 0.6 oz/week     1 Glasses of wine per week    Drug use: No    Sexual activity: Yes     Partners: Male     Birth control/ protection: Pill     Social History Narrative    No narrative on file     Past Surgical History:   Procedure Laterality Date    TONSILLECTOMY         Review of Systems   Constitutional: Negative.  Negative for fatigue and fever.   HENT: Negative.  Negative for congestion, rhinorrhea and sore throat.    Eyes: Negative.  Negative for pain.   Respiratory: Negative.  Negative for cough and shortness of breath.     Cardiovascular: Negative.    Gastrointestinal: Negative.  Negative for anorexia, diarrhea and vomiting.   Endocrine: Negative.    Genitourinary: Negative.    Musculoskeletal: Negative.  Negative for joint pain.   Skin: Positive for rash. Negative for nail changes.   Allergic/Immunologic: Negative.    Neurological: Negative.    Psychiatric/Behavioral: Negative.        Objective:      Physical Exam   Constitutional: She is oriented to person, place, and time. She appears well-developed and well-nourished.   HENT:   Head: Normocephalic.   Right Ear: External ear normal.   Left Ear: External ear normal.   Nose: Nose normal.   Mouth/Throat: Oropharynx is clear and moist.   Eyes: Conjunctivae are normal. Pupils are equal, round, and reactive to light.   Neck: Normal range of motion. Neck supple.   Cardiovascular: Normal rate, regular rhythm and normal heart sounds.    Pulmonary/Chest: Effort normal and breath sounds normal.   Abdominal: Soft. Bowel sounds are normal.   Musculoskeletal: Normal range of motion.   Neurological: She is alert and oriented to person, place, and time.   Skin: Skin is warm and dry. Capillary refill takes 2 to 3 seconds. Rash noted. Rash is urticarial.   Psychiatric: She has a normal mood and affect. Her behavior is normal. Judgment and thought content normal.   Nursing note and vitals reviewed.      Assessment:       1. Rash        Plan:           Tomi was seen today for rash.    Diagnoses and all orders for this visit:    Rash  -     triamcinolone acetonide 0.5% (KENALOG) 0.5 % Crea; Apply topically 2 (two) times daily. for 10 days  Claritin OTC as directed   RTC as needed  Report to ER immediately if symptoms worsen

## 2018-08-02 ENCOUNTER — OFFICE VISIT (OUTPATIENT)
Dept: FAMILY MEDICINE | Facility: CLINIC | Age: 23
End: 2018-08-02
Payer: COMMERCIAL

## 2018-08-02 ENCOUNTER — LAB VISIT (OUTPATIENT)
Dept: LAB | Facility: HOSPITAL | Age: 23
End: 2018-08-02
Attending: FAMILY MEDICINE
Payer: COMMERCIAL

## 2018-08-02 VITALS
DIASTOLIC BLOOD PRESSURE: 77 MMHG | HEART RATE: 91 BPM | TEMPERATURE: 98 F | HEIGHT: 63 IN | BODY MASS INDEX: 38.98 KG/M2 | WEIGHT: 220 LBS | SYSTOLIC BLOOD PRESSURE: 123 MMHG

## 2018-08-02 DIAGNOSIS — Z32.00 POSSIBLE PREGNANCY: Primary | ICD-10-CM

## 2018-08-02 DIAGNOSIS — Z32.00 POSSIBLE PREGNANCY: ICD-10-CM

## 2018-08-02 DIAGNOSIS — L08.9 SKIN INFECTION: ICD-10-CM

## 2018-08-02 LAB
B-HCG UR QL: POSITIVE
HCG INTACT+B SERPL-ACNC: 3426 MIU/ML

## 2018-08-02 PROCEDURE — 3008F BODY MASS INDEX DOCD: CPT | Mod: CPTII,S$GLB,, | Performed by: NURSE PRACTITIONER

## 2018-08-02 PROCEDURE — 81025 URINE PREGNANCY TEST: CPT | Mod: PO

## 2018-08-02 PROCEDURE — 36415 COLL VENOUS BLD VENIPUNCTURE: CPT | Mod: PO

## 2018-08-02 PROCEDURE — 99213 OFFICE O/P EST LOW 20 MIN: CPT | Mod: S$GLB,,, | Performed by: NURSE PRACTITIONER

## 2018-08-02 PROCEDURE — 99999 PR PBB SHADOW E&M-EST. PATIENT-LVL IV: CPT | Mod: PBBFAC,,, | Performed by: NURSE PRACTITIONER

## 2018-08-02 PROCEDURE — 84702 CHORIONIC GONADOTROPIN TEST: CPT

## 2018-08-02 RX ORDER — MUPIROCIN 20 MG/G
OINTMENT TOPICAL 3 TIMES DAILY
Qty: 1 TUBE | Refills: 0 | Status: SHIPPED | OUTPATIENT
Start: 2018-08-02 | End: 2018-08-12

## 2018-08-02 NOTE — PROGRESS NOTES
Subjective:       Patient ID: Tomi Ervin is a 23 y.o. female.    Chief Complaint: No chief complaint on file.  Pt in today for positive home pregnancy test. Pt states period one day late; last menstrual cycle 7/4/2018. Denies any nausea, vomiting. Pt also states that she has a small erythematous pimple to her right breast; no drainage; painless. Pt has no other complaints today.  Past Medical History:   Diagnosis Date    Agoraphobia with panic attacks 1/16/2018    Anxiety 1/16/2018    Fibromyalgia     GERD (gastroesophageal reflux disease)     Migraines     Obesity (BMI 35.0-39.9 without comorbidity)     Pyelonephritis 2011    Urticaria 2008    lasted 3 months resolved     Social History     Social History    Marital status: Single     Spouse name: N/A    Number of children: N/A    Years of education: N/A     Occupational History    Not on file.     Social History Main Topics    Smoking status: Current Every Day Smoker     Packs/day: 0.50     Start date: 6/3/2012    Smokeless tobacco: Not on file    Alcohol use 0.6 oz/week     1 Glasses of wine per week    Drug use: No    Sexual activity: Yes     Partners: Male     Birth control/ protection: Pill     Social History Narrative    No narrative on file     Past Surgical History:   Procedure Laterality Date    TONSILLECTOMY         HPI  Review of Systems   Constitutional: Negative.  Negative for activity change and unexpected weight change.   HENT: Negative.  Negative for hearing loss, rhinorrhea and trouble swallowing.    Eyes: Negative.  Negative for discharge and visual disturbance.   Respiratory: Negative.  Negative for chest tightness and wheezing.    Cardiovascular: Negative.  Negative for chest pain and palpitations.   Gastrointestinal: Negative.  Negative for blood in stool, constipation, diarrhea and vomiting.   Endocrine: Negative.  Negative for polydipsia and polyuria.   Genitourinary: Negative.  Negative for difficulty urinating,  dysuria, hematuria and menstrual problem.   Musculoskeletal: Negative.  Negative for arthralgias, joint swelling and neck pain.   Skin: Negative.    Allergic/Immunologic: Negative.    Neurological: Negative.  Negative for weakness and headaches.   Psychiatric/Behavioral: Negative.  Negative for confusion and dysphoric mood.       Objective:      Physical Exam   Constitutional: She is oriented to person, place, and time. She appears well-developed and well-nourished.   HENT:   Head: Normocephalic.   Right Ear: External ear normal.   Left Ear: External ear normal.   Nose: Nose normal.   Mouth/Throat: Oropharynx is clear and moist.   Eyes: Conjunctivae are normal. Pupils are equal, round, and reactive to light.   Neck: Normal range of motion. Neck supple.   Cardiovascular: Normal rate, regular rhythm and normal heart sounds.    Pulmonary/Chest: Effort normal and breath sounds normal.   Abdominal: Soft. Bowel sounds are normal.   Musculoskeletal: Normal range of motion.   Neurological: She is alert and oriented to person, place, and time.   Skin: Skin is warm and dry. Capillary refill takes 2 to 3 seconds.        Psychiatric: She has a normal mood and affect. Her behavior is normal. Judgment and thought content normal.   Nursing note and vitals reviewed.      Assessment:       1. Possible pregnancy    2. Skin infection        Plan:           Diagnoses and all orders for this visit:    Possible pregnancy  -     Pregnancy, urine rapid  -     hCG, quantitative; Future  -     Ambulatory referral to Obstetrics / Gynecology        Prenatal vitamin OTC as directed    Skin infection  Comments:  Right breast  -     mupirocin (BACTROBAN) 2 % ointment; Apply topically 3 (three) times daily. for 10 days

## 2018-08-03 ENCOUNTER — INITIAL PRENATAL (OUTPATIENT)
Dept: OBSTETRICS AND GYNECOLOGY | Facility: CLINIC | Age: 23
End: 2018-08-03
Payer: COMMERCIAL

## 2018-08-03 VITALS
SYSTOLIC BLOOD PRESSURE: 124 MMHG | WEIGHT: 222.88 LBS | BODY MASS INDEX: 39.48 KG/M2 | DIASTOLIC BLOOD PRESSURE: 80 MMHG

## 2018-08-03 DIAGNOSIS — O99.330 TOBACCO USE IN PREGNANCY, ANTEPARTUM: ICD-10-CM

## 2018-08-03 DIAGNOSIS — F41.9 ANXIETY: ICD-10-CM

## 2018-08-03 DIAGNOSIS — Z34.81 SUPERVISION OF NORMAL INTRAUTERINE PREGNANCY IN MULTIGRAVIDA IN FIRST TRIMESTER: ICD-10-CM

## 2018-08-03 PROCEDURE — 0500F INITIAL PRENATAL CARE VISIT: CPT | Mod: S$GLB,,, | Performed by: ADVANCED PRACTICE MIDWIFE

## 2018-08-03 PROCEDURE — 87491 CHLMYD TRACH DNA AMP PROBE: CPT

## 2018-08-03 PROCEDURE — 81025 URINE PREGNANCY TEST: CPT | Mod: S$GLB,,, | Performed by: ADVANCED PRACTICE MIDWIFE

## 2018-08-03 PROCEDURE — 3008F BODY MASS INDEX DOCD: CPT | Mod: CPTII,S$GLB,, | Performed by: ADVANCED PRACTICE MIDWIFE

## 2018-08-03 PROCEDURE — 99999 PR PBB SHADOW E&M-EST. PATIENT-LVL II: CPT | Mod: PBBFAC,,, | Performed by: ADVANCED PRACTICE MIDWIFE

## 2018-08-04 LAB
C TRACH DNA SPEC QL NAA+PROBE: NOT DETECTED
N GONORRHOEA DNA SPEC QL NAA+PROBE: NOT DETECTED

## 2018-08-06 PROBLEM — O99.330 TOBACCO USE IN PREGNANCY, ANTEPARTUM: Status: ACTIVE | Noted: 2018-08-06

## 2018-08-06 NOTE — PROGRESS NOTES
Oriented to our practice, ANSHUL/MD collaborative care.  zika and dietary recommendations made. Pt is planning to deliver with previous provider, [facility], but made an appt here bc sooner than date provided there. Exp too early for US, rec timing of US 8 wks gestation. Discussed meds and history, planning to wean off zoloft, plan is to quit smoking, discussed support options. Some mild cramping, no bleeding. Culture collected, will do labs in [place] with US, has appt 8/17/18. al

## 2018-09-04 ENCOUNTER — TELEPHONE (OUTPATIENT)
Dept: FAMILY MEDICINE | Facility: CLINIC | Age: 23
End: 2018-09-04

## 2018-09-04 RX ORDER — NORGESTIMATE AND ETHINYL ESTRADIOL 7DAYSX3 LO
KIT ORAL
Qty: 28 TABLET | Refills: 0 | OUTPATIENT
Start: 2018-09-04

## 2018-09-04 NOTE — TELEPHONE ENCOUNTER
----- Message from Jina Hagan sent at 9/4/2018  3:23 PM CDT -----  Contact: [facility]  Caller was calling to check the status of a refill request for ORTHO-THR-[id number]--[phone]      .  [facility]/pharmacy #[id number] - [place] - [address] [place]  [address] [place] [id number]  Phone: [phone] Fax: [phone]

## 2018-09-11 ENCOUNTER — HOSPITAL ENCOUNTER (EMERGENCY)
Facility: OTHER | Age: 23
Discharge: HOME | End: 2018-09-12
Attending: EMERGENCY MEDICINE
Payer: COMMERCIAL

## 2018-09-11 DIAGNOSIS — Z34.91 FIRST TRIMESTER PREGNANCY: ICD-10-CM

## 2018-09-11 DIAGNOSIS — R10.9 ABDOMINAL PAIN: ICD-10-CM

## 2018-09-11 DIAGNOSIS — R10.9 RIGHT FLANK PAIN: Primary | ICD-10-CM

## 2018-09-11 LAB
ABO + RH BLD: NORMAL
ALBUMIN SERPL BCP-MCNC: 3.3 G/DL
ALP SERPL-CCNC: 78 U/L
ALT SERPL W/O P-5'-P-CCNC: 22 U/L
ANION GAP SERPL CALC-SCNC: 11 MMOL/L
AST SERPL-CCNC: 20 U/L
B-HCG UR QL: POSITIVE
BACTERIA #/AREA URNS HPF: ABNORMAL /HPF
BASOPHILS # BLD AUTO: 0.02 K/UL
BASOPHILS NFR BLD: 0.2 %
BILIRUB DIRECT SERPL-MCNC: 0.1 MG/DL
BILIRUB SERPL-MCNC: 0.2 MG/DL
BILIRUB UR QL STRIP: NEGATIVE
BLD GP AB SCN CELLS X3 SERPL QL: NORMAL
BUN SERPL-MCNC: 8 MG/DL
CALCIUM SERPL-MCNC: 9.7 MG/DL
CAOX CRY URNS QL MICRO: ABNORMAL
CHLORIDE SERPL-SCNC: 107 MMOL/L
CLARITY UR: CLEAR
CO2 SERPL-SCNC: 20 MMOL/L
COLOR UR: YELLOW
CREAT SERPL-MCNC: 0.7 MG/DL
CTP QC/QA: YES
DIFFERENTIAL METHOD: ABNORMAL
EOSINOPHIL # BLD AUTO: 0.3 K/UL
EOSINOPHIL NFR BLD: 3.2 %
ERYTHROCYTE [DISTWIDTH] IN BLOOD BY AUTOMATED COUNT: 15.6 %
EST. GFR  (AFRICAN AMERICAN): >60 ML/MIN/1.73 M^2
EST. GFR  (NON AFRICAN AMERICAN): >60 ML/MIN/1.73 M^2
GLUCOSE SERPL-MCNC: 105 MG/DL
GLUCOSE UR QL STRIP: NEGATIVE
HCG INTACT+B SERPL-ACNC: NORMAL MIU/ML
HCT VFR BLD AUTO: 34.5 %
HGB BLD-MCNC: 11.2 G/DL
HGB UR QL STRIP: NEGATIVE
KETONES UR QL STRIP: NEGATIVE
LEUKOCYTE ESTERASE UR QL STRIP: ABNORMAL
LIPASE SERPL-CCNC: 16 U/L
LYMPHOCYTES # BLD AUTO: 1.8 K/UL
LYMPHOCYTES NFR BLD: 19.4 %
MCH RBC QN AUTO: 26.1 PG
MCHC RBC AUTO-ENTMCNC: 32.5 G/DL
MCV RBC AUTO: 80 FL
MICROSCOPIC COMMENT: ABNORMAL
MONOCYTES # BLD AUTO: 0.7 K/UL
MONOCYTES NFR BLD: 8 %
NEUTROPHILS # BLD AUTO: 6.3 K/UL
NEUTROPHILS NFR BLD: 68.8 %
NITRITE UR QL STRIP: NEGATIVE
PH UR STRIP: 6 [PH] (ref 5–8)
PLATELET # BLD AUTO: 237 K/UL
PMV BLD AUTO: 9.8 FL
POTASSIUM SERPL-SCNC: 3.6 MMOL/L
PROT SERPL-MCNC: 6.9 G/DL
PROT UR QL STRIP: NEGATIVE
RBC # BLD AUTO: 4.29 M/UL
SODIUM SERPL-SCNC: 138 MMOL/L
SP GR UR STRIP: >=1.03 (ref 1–1.03)
URN SPEC COLLECT METH UR: ABNORMAL
UROBILINOGEN UR STRIP-ACNC: NEGATIVE EU/DL
WBC # BLD AUTO: 9.17 K/UL
WBC #/AREA URNS HPF: 7 /HPF (ref 0–5)

## 2018-09-11 PROCEDURE — 80076 HEPATIC FUNCTION PANEL: CPT

## 2018-09-11 PROCEDURE — 80048 BASIC METABOLIC PNL TOTAL CA: CPT

## 2018-09-11 PROCEDURE — 81025 URINE PREGNANCY TEST: CPT | Performed by: EMERGENCY MEDICINE

## 2018-09-11 PROCEDURE — 83690 ASSAY OF LIPASE: CPT

## 2018-09-11 PROCEDURE — 85025 COMPLETE CBC W/AUTO DIFF WBC: CPT

## 2018-09-11 PROCEDURE — 87086 URINE CULTURE/COLONY COUNT: CPT

## 2018-09-11 PROCEDURE — 84702 CHORIONIC GONADOTROPIN TEST: CPT

## 2018-09-11 PROCEDURE — 99284 EMERGENCY DEPT VISIT MOD MDM: CPT

## 2018-09-11 PROCEDURE — 81000 URINALYSIS NONAUTO W/SCOPE: CPT

## 2018-09-11 PROCEDURE — 86850 RBC ANTIBODY SCREEN: CPT

## 2018-09-11 RX ORDER — CEPHALEXIN 250 MG/1
250 CAPSULE ORAL EVERY 6 HOURS
COMMUNITY
End: 2018-10-24

## 2018-09-12 VITALS
TEMPERATURE: 98 F | RESPIRATION RATE: 20 BRPM | WEIGHT: 222 LBS | DIASTOLIC BLOOD PRESSURE: 59 MMHG | HEART RATE: 88 BPM | SYSTOLIC BLOOD PRESSURE: 121 MMHG | BODY MASS INDEX: 39.34 KG/M2 | HEIGHT: 63 IN | OXYGEN SATURATION: 99 %

## 2018-09-12 PROBLEM — N30.00 ACUTE CYSTITIS WITHOUT HEMATURIA: Status: ACTIVE | Noted: 2018-09-12

## 2018-09-12 PROCEDURE — 25000003 PHARM REV CODE 250: Performed by: EMERGENCY MEDICINE

## 2018-09-12 RX ORDER — ACETAMINOPHEN 500 MG
1000 TABLET ORAL
Status: COMPLETED | OUTPATIENT
Start: 2018-09-12 | End: 2018-09-12

## 2018-09-12 RX ADMIN — ACETAMINOPHEN 1000 MG: 500 TABLET, FILM COATED ORAL at 12:09

## 2018-09-12 NOTE — SUBJECTIVE & OBJECTIVE
Obstetric HPI:    Obstetric History       T1      L1     SAB1   TAB0   Ectopic0   Multiple0   Live Births1       # Outcome Date GA Lbr Nito/2nd Weight Sex Delivery Anes PTL Lv   3 Current            2 SAB 17 6w0d          1 Term 14 37w0d  2.863 kg (6 lb 5 oz) F Vag-Spont EPI  ALEXYS        Past Medical History:   Diagnosis Date    Agoraphobia with panic attacks 2018    Anxiety 2018    Fibromyalgia     GERD (gastroesophageal reflux disease)     Migraines     Obesity (BMI 35.0-39.9 without comorbidity)     Pyelonephritis     Urticaria     lasted 3 months resolved     Past Surgical History:   Procedure Laterality Date    TONSILLECTOMY           (Not in a hospital admission)    Review of patient's allergies indicates:  No Known Allergies     Family History     Problem Relation (Age of Onset)    Cancer Paternal Grandmother    Diabetes Mother    Mental illness Mother, Sister    Uterine cancer Paternal Grandmother        Tobacco Use    Smoking status: Current Every Day Smoker     Packs/day: 0.50     Start date: 6/3/2012   Substance and Sexual Activity    Alcohol use: Yes     Alcohol/week: 0.6 oz     Types: 1 Glasses of wine per week    Drug use: No    Sexual activity: Yes     Partners: Male     Birth control/protection: Pill     Review of Systems   Constitutional: Positive for chills and fever.   Eyes: Negative for visual disturbance.   Respiratory: Negative for shortness of breath.    Cardiovascular: Negative for chest pain.   Gastrointestinal: Negative for abdominal pain, diarrhea, nausea and vomiting.   Endocrine: Negative for diabetes.   Genitourinary: Positive for flank pain. Negative for dysuria, hematuria, vaginal bleeding and vaginal discharge.   Skin:  Negative for rash.   Neurological: Negative for headaches.   Hematological: Does not bruise/bleed easily.   Psychiatric/Behavioral: Negative.  The patient is not nervous/anxious.       Objective:     Vital Signs  (Most Recent):  Temp: 98 °F (36.7 °C) (09/12/18 0057)  Pulse: 88 (09/12/18 0042)  Resp: 20 (09/11/18 2334)  BP: (!) 121/59 (09/11/18 2224)  SpO2: 99 % (09/12/18 0042) Vital Signs (24h Range):  Temp:  [98 °F (36.7 °C)-98.6 °F (37 °C)] 98 °F (36.7 °C)  Pulse:  [] 88  Resp:  [20] 20  SpO2:  [99 %-100 %] 99 %  BP: (121-128)/(59-81) 121/59     Weight: 100.7 kg (222 lb)  Body mass index is 39.33 kg/m².      Physical Exam:   Constitutional: She is oriented to person, place, and time. She appears well-developed and well-nourished. No distress.   Patient appears to be in no acute distress.     HENT:   Head: Normocephalic and atraumatic.    Eyes: Conjunctivae are normal.    Neck: Neck supple.    Cardiovascular: Normal rate, regular rhythm and intact distal pulses.     Pulmonary/Chest: Effort normal. No respiratory distress.        Abdominal: Soft. She exhibits no distension and no abdominal incision. There is no tenderness. There is no rebound and no guarding.     Genitourinary: Vagina normal.   Genitourinary Comments: SSE: Normal external female genitalia, normal urethral meatus, normal vaginal rugae, normal vaginal mucosa, no vaginal blood in canal, normal physiologic discharge, surgically absent cervix and uterus, no adnexal masses palpated on bimanual exam. No CMT.     Mild left sided CVA tenderness but not clinically concerning for acute pyelonephritis.                Neurological: She is alert and oriented to person, place, and time.    Skin: Skin is warm and dry. She is not diaphoretic.    Psychiatric: She has a normal mood and affect. Her behavior is normal. Judgment and thought content normal.     Significant Labs:  Lab Results   Component Value Date    GROUPTRH O POS 09/11/2018       CBC:   Recent Labs   Lab  09/11/18   2148   WBC  9.17   RBC  4.29   HGB  11.2*   HCT  34.5*   PLT  237   MCV  80*   MCH  26.1*   MCHC  32.5     Recent Labs   Lab  09/11/18   5515   COLORU  Yellow   SPECGRAV  >=1.030*   PHUR  6.0    PROTEINUA  Negative   BACTERIA  Many*   NITRITE  Negative   LEUKOCYTESUR  1+*   UROBILINOGEN  Negative     I have personallly reviewed all pertinent lab results from the last 24 hours.

## 2018-09-12 NOTE — HPI
"Ms Ervin is a 23 year old  at roughly 10 weeks gestation that presented to the ED complaining of right sided flank pain which has been present for the past two weeks. She gave the following history of the ED Physician:    "This is a 23 y.o. female, who is 10 weeks pregnant, who presents with complaint of constant right sided flank pain that has lasted two weeks. She describes the pain as stabbing and pressure just below the ribs. She also reports that she has a fever and nausea. She denies chills, sore throat, SOB, CP, D/V, any other urinary symptoms, vaginal bleeding, back pain, and headache. She reports that she has been taking Tylenol constantly. She reports that she has an appointment with OB in three days.     She also reports that she was seen in the ED in [facility]) three days ago, and was told she had a UTI. She was given Keflex, and she reports that she started taking it that morning. She reports that the US of her kidney and bladder were fine and that blood work was fine according to the providers at the facility.      Pt also reports that her symptoms are similar to those she experienced when she had kidney stones seven years ago. She also reports that she has PMHx of pyleonephritis and fibromyalgia. "    The [facility] service was consulted given the patient's symptoms and concern for possible hydronephrosis. The aforementioned history was confirmed with the patient.  A review of the medical record demonstrates a neg urine culture and a neg renal ultrasound which failed to demonstrate evidence of hydronephrosis or a kidney stone during her recent evaluation at an outside hospital ED.        "

## 2018-09-12 NOTE — ASSESSMENT & PLAN NOTE
- TVUS with confirmed IUP, No clinical concern for ectopic pregnancy  - Clinical presentation not convincing for pyelonephritis  - Patient is afebrile. No WBC  - While Leuks are present in UA (clean catch) no other signs of UTI on UA.   - Mild CVA tenderness noted on exam. May be musculo-skeletal in etiology  - Patient had negative urine culture on recent work-up  - Neg Renal Ultrasound for Stones or Hydronephrosis on recent work-up  - Do not feel patient needs for work-up or inpatient management at this time  - Will continue Keflex\  - Patient to follow-up with OBGYN on Friday to establish care for this pregnancy  - Infection precautions given

## 2018-09-12 NOTE — ED NOTES
Pt sitting up in bed, respirations even/unlabored. NAD noted. Updated pt on POC. Side rails up x2, call bell within reach. Will continue to monitor.

## 2018-09-12 NOTE — ED TRIAGE NOTES
"Pt arrives to ED with c/o "sharp" right flank pain x2 weeks associated with subjective fever (101 taken today at home), urinary hesitancy, and nausea but denies vomiting, diarrhea, abdominal pain, back pain, dysuria, vaginal bleeding/discharge, hematuria. Pt is currently on keflex for tx UTI with no relief in symptoms. Pt states " my symptoms have only gotten worse since I started the antibiotics"  Pt took tylenol around 4 pm today   "

## 2018-09-12 NOTE — ED PROVIDER NOTES
"Encounter Date: 9/11/2018    SCRIBE #1 NOTE: I, Ayla Mathew, am scribing for, and in the presence of, Dr. Pizano.       History     Chief Complaint   Patient presents with    Flank Pain     c/o right flank pain x2 weeks pta, seen at [facility] treated for UTI, pain is worse now, "I think its a stone", state "I feel like I cant fully empty my bladder"      Time seen by provider: 9:36 PM    This is a 23 y.o. female, who is 10 weeks pregnant, who presents with complaint of constant right sided flank pain that has lasted two weeks. She describes the pain as stabbing and pressure just below the ribs. She also reports that she has a fever and nausea. She denies chills, sore throat, SOB, CP, D/V, any other urinary symptoms, vaginal bleeding, back pain, and headache. She reports that she has been taking Tylenol constantly. She reports that she has an appointment with OB in three days.    She also reports that she was seen in the ED in [facility]) three days ago, and was told she had a UTI. She was given Keflex, and she reports that she started taking it that morning. She reports that the US of her kidney and bladder were fine and that blood work was fine according to the providers at the facility.     Pt also reports that her symptoms are similar to those she experienced when she had kidney stones seven years ago. She also reports that she has PMHx of pyleonephritis and fibromyalgia.       The history is provided by the patient and a parent.     Review of patient's allergies indicates:  No Known Allergies  Past Medical History:   Diagnosis Date    Agoraphobia with panic attacks 1/16/2018    Anxiety 1/16/2018    Fibromyalgia     GERD (gastroesophageal reflux disease)     Migraines     Obesity (BMI 35.0-39.9 without comorbidity)     Pyelonephritis 2011    Urticaria 2008    lasted 3 months resolved     Past Surgical History:   Procedure Laterality Date    TONSILLECTOMY       Family History "   Problem Relation Age of Onset    Diabetes Mother     Mental illness Mother         gambling problem    Uterine cancer Paternal Grandmother     Cancer Paternal Grandmother     Mental illness Sister         borderline personality disorder     Social History     Tobacco Use    Smoking status: Current Every Day Smoker     Packs/day: 0.50     Start date: 6/3/2012   Substance Use Topics    Alcohol use: Yes     Alcohol/week: 0.6 oz     Types: 1 Glasses of wine per week    Drug use: No     Review of Systems   Constitutional: Positive for fever. Negative for chills.   HENT: Negative for sore throat.    Respiratory: Negative for shortness of breath.    Cardiovascular: Negative for chest pain.   Gastrointestinal: Positive for nausea. Negative for diarrhea and vomiting.   Genitourinary: Positive for flank pain. Negative for decreased urine volume, difficulty urinating, dyspareunia, dysuria, enuresis, frequency, hematuria, urgency and vaginal bleeding.   Musculoskeletal: Negative for back pain.   Skin: Negative for color change, pallor and rash.   Neurological: Negative for weakness and headaches.       Physical Exam     Initial Vitals [09/11/18 2046]   BP Pulse Resp Temp SpO2   128/81 100 20 98.6 °F (37 °C) 100 %      MAP       --         Physical Exam    Nursing note and vitals reviewed.  Constitutional: She appears well-developed and well-nourished. She is not diaphoretic. No distress.   HENT:   Head: Normocephalic and atraumatic.   Eyes: Conjunctivae and EOM are normal. No scleral icterus.   Neck: Normal range of motion. Neck supple.   Cardiovascular: Normal rate, regular rhythm and normal heart sounds. Exam reveals no gallop and no friction rub.    No murmur heard.  Pulmonary/Chest: Breath sounds normal. No respiratory distress. She has no wheezes. She has no rhonchi. She has no rales.   Abdominal: She exhibits no distension. There is no tenderness. There is no rebound and no guarding.   Right CVA tenderness. RLQ  tenderness.   Musculoskeletal: Normal range of motion. She exhibits no edema or tenderness.   Neurological: She is alert and oriented to person, place, and time.   Skin: Skin is warm and dry. No rash noted. No erythema. No pallor.   Psychiatric: She has a normal mood and affect. Her behavior is normal. Judgment and thought content normal.         ED Course   Procedures  Labs Reviewed   POCT URINE PREGNANCY - Abnormal; Notable for the following components:       Result Value    POC Preg Test, Ur Positive (*)     All other components within normal limits   URINALYSIS   CBC W/ AUTO DIFFERENTIAL   URINALYSIS, REFLEX TO URINE CULTURE   BASIC METABOLIC PANEL          Imaging Results    None          Medical Decision Making:   Clinical Tests:   Lab Tests: Ordered and Reviewed  Radiological Study: Ordered and Reviewed  ED Management:  Well-appearing pregnant patient presents with intermittent fevers and right flank pain.  History of UTI and kidney stones.  Negative workup at outside hospital last week for kidney stones.  Was started on Keflex.  Denies improvement.  Also concerned about ectopic pregnancy despite having no vaginal bleeding.  Ultrasound here demonstrates normal IUP with good heart beat.  Blood work without concerning findings.  Urinalysis demonstrates persistent whites and bacteria.  Also some calcium oxalate crystals.  Patient evaluated by OBGYN.  Plans for expectant management, continue Keflex, follow up closely with her OBGYN, and return here immediately if worsening.  Extensive counseling with patient and family member.  They are comfortable with this plan.    I did have an extensive talk regarding signs to return for and need for follow up. Patient expressed understanding and will monitor symptoms closely and follow-up as needed.    DELILAH Pizano M.D.  09/12/2018  1:53 AM      11:29 PM - Discussed case with OB/GYN. Will see pt for evaluation.             Scribe Attestation:   Scribe #1: I performed  the above scribed service and the documentation accurately describes the services I performed. I attest to the accuracy of the note.               Clinical Impression:     1. Right flank pain    2. Abdominal pain    3. First trimester pregnancy                                   Nicolás Pizano MD  09/12/18 0153

## 2018-09-12 NOTE — CONSULTS
"[facility]  Obstetrics  Consult Note    Patient Name: Tomi Ervin  MRN: [id number]  Admission Date: 2018  Hospital Length of Stay: 0 days  Code Status: No Order  Primary Care Provider: Julio Cabrera MD  Principal Problem: Acute cystitis without hematuria    Consults  Subjective:     Principal Problem:Acute cystitis without hematuria    History of Present Illness:  Ms Ervin is a 23 year old  at roughly 10 weeks gestation that presented to the ED complaining of right sided flank pain which has been present for the past two weeks. She gave the following history of the ED Physician:    "This is a 23 y.o. female, who is 10 weeks pregnant, who presents with complaint of constant right sided flank pain that has lasted two weeks. She describes the pain as stabbing and pressure just below the ribs. She also reports that she has a fever and nausea. She denies chills, sore throat, SOB, CP, D/V, any other urinary symptoms, vaginal bleeding, back pain, and headache. She reports that she has been taking Tylenol constantly. She reports that she has an appointment with OB in three days.     She also reports that she was seen in the ED in [facility]) three days ago, and was told she had a UTI. She was given Keflex, and she reports that she started taking it that morning. She reports that the US of her kidney and bladder were fine and that blood work was fine according to the providers at the facility.      Pt also reports that her symptoms are similar to those she experienced when she had kidney stones seven years ago. She also reports that she has PMHx of pyleonephritis and fibromyalgia.  "    The [facility] service was consulted given the patient's symptoms and concern for possible hydronephrosis. The aforementioned history was confirmed with the patient.  A review of the medical record demonstrates a neg urine culture and a neg renal ultrasound which failed to demonstrate " evidence of hydronephrosis or a kidney stone during her recent evaluation at an outside hospital ED.          Obstetric HPI:    Obstetric History       T1      L1     SAB1   TAB0   Ectopic0   Multiple0   Live Births1       # Outcome Date GA Lbr Nito/2nd Weight Sex Delivery Anes PTL Lv   3 Current            2 SAB 17 6w0d          1 Term 14 37w0d  2.863 kg (6 lb 5 oz) F Vag-Spont EPI  ALEXYS        Past Medical History:   Diagnosis Date    Agoraphobia with panic attacks 2018    Anxiety 2018    Fibromyalgia     GERD (gastroesophageal reflux disease)     Migraines     Obesity (BMI 35.0-39.9 without comorbidity)     Pyelonephritis     Urticaria     lasted 3 months resolved     Past Surgical History:   Procedure Laterality Date    TONSILLECTOMY           (Not in a hospital admission)    Review of patient's allergies indicates:  No Known Allergies     Family History     Problem Relation (Age of Onset)    Cancer Paternal Grandmother    Diabetes Mother    Mental illness Mother, Sister    Uterine cancer Paternal Grandmother        Tobacco Use    Smoking status: Current Every Day Smoker     Packs/day: 0.50     Start date: 6/3/2012   Substance and Sexual Activity    Alcohol use: Yes     Alcohol/week: 0.6 oz     Types: 1 Glasses of wine per week    Drug use: No    Sexual activity: Yes     Partners: Male     Birth control/protection: Pill     Review of Systems   Constitutional: Positive for chills and fever.   Eyes: Negative for visual disturbance.   Respiratory: Negative for shortness of breath.    Cardiovascular: Negative for chest pain.   Gastrointestinal: Negative for abdominal pain, diarrhea, nausea and vomiting.   Endocrine: Negative for diabetes.   Genitourinary: Positive for flank pain. Negative for dysuria, hematuria, vaginal bleeding and vaginal discharge.   Skin:  Negative for rash.   Neurological: Negative for headaches.   Hematological: Does not bruise/bleed  easily.   Psychiatric/Behavioral: Negative.  The patient is not nervous/anxious.       Objective:     Vital Signs (Most Recent):  Temp: 98 °F (36.7 °C) (09/12/18 0057)  Pulse: 88 (09/12/18 0042)  Resp: 20 (09/11/18 2334)  BP: (!) 121/59 (09/11/18 2224)  SpO2: 99 % (09/12/18 0042) Vital Signs (24h Range):  Temp:  [98 °F (36.7 °C)-98.6 °F (37 °C)] 98 °F (36.7 °C)  Pulse:  [] 88  Resp:  [20] 20  SpO2:  [99 %-100 %] 99 %  BP: (121-128)/(59-81) 121/59     Weight: 100.7 kg (222 lb)  Body mass index is 39.33 kg/m².      Physical Exam:   Constitutional: She is oriented to person, place, and time. She appears well-developed and well-nourished. No distress.   Patient appears to be in no acute distress.     HENT:   Head: Normocephalic and atraumatic.    Eyes: Conjunctivae are normal.    Neck: Neck supple.    Cardiovascular: Normal rate, regular rhythm and intact distal pulses.     Pulmonary/Chest: Effort normal. No respiratory distress.        Abdominal: Soft. She exhibits no distension and no abdominal incision. There is no tenderness. There is no rebound and no guarding.     Genitourinary: Vagina normal.   Genitourinary Comments: SSE: Normal external female genitalia, normal urethral meatus, normal vaginal rugae, normal vaginal mucosa, no vaginal blood in canal, normal physiologic discharge, surgically absent cervix and uterus, no adnexal masses palpated on bimanual exam. No CMT.     Mild left sided CVA tenderness but not clinically concerning for acute pyelonephritis.                Neurological: She is alert and oriented to person, place, and time.    Skin: Skin is warm and dry. She is not diaphoretic.    Psychiatric: She has a normal mood and affect. Her behavior is normal. Judgment and thought content normal.     Significant Labs:  Lab Results   Component Value Date    GROUPTRH O POS 09/11/2018       CBC:   Recent Labs   Lab  09/11/18   2148   WBC  9.17   RBC  4.29   HGB  11.2*   HCT  34.5*   PLT  237   MCV  80*    MCH  26.1*   MCHC  32.5     Recent Labs   Lab  18   2133   COLORU  Yellow   SPECGRAV  >=1.030*   PHUR  6.0   PROTEINUA  Negative   BACTERIA  Many*   NITRITE  Negative   LEUKOCYTESUR  1+*   UROBILINOGEN  Negative     I have personallly reviewed all pertinent lab results from the last 24 hours.    Assessment/Plan:     23 y.o. female  at 10w0d for:    * Acute cystitis without hematuria    - TVUS with confirmed IUP, No clinical concern for ectopic pregnancy  - Clinical presentation not convincing for pyelonephritis  - Patient is afebrile. No WBC  - While Leuks are present in UA (clean catch) no other signs of UTI on UA.   - Mild CVA tenderness noted on exam. May be musculo-skeletal in etiology  - Patient had negative urine culture on recent work-up  - Neg Renal Ultrasound for Stones or Hydronephrosis on recent work-up  - Do not feel patient needs for work-up or inpatient management at this time  - Will continue Keflex\  - Patient to follow-up with OBGYN on Friday to establish care for this pregnancy  - Infection precautions given            Thank you for your consult. I will sign off. Please contact us if you have any additional questions.    Stanislav Moss MD  Obstetrics & Gynecology  [facility]    The patient presented to the emergency room with some flank pain she had no fever she had a negative urine she had an ultrasound that did not indicate renal stones her urine was negative for blood her culture was negative she was discharged from the ER and and told to have a follow-up with her obstetrician for follow.

## 2018-09-13 LAB — BACTERIA UR CULT: NORMAL

## 2018-09-21 ENCOUNTER — OFFICE VISIT (OUTPATIENT)
Dept: FAMILY MEDICINE | Facility: CLINIC | Age: 23
End: 2018-09-21
Payer: COMMERCIAL

## 2018-09-21 VITALS
TEMPERATURE: 99 F | HEART RATE: 119 BPM | SYSTOLIC BLOOD PRESSURE: 126 MMHG | DIASTOLIC BLOOD PRESSURE: 74 MMHG | HEIGHT: 63 IN | WEIGHT: 220.44 LBS | BODY MASS INDEX: 39.06 KG/M2

## 2018-09-21 DIAGNOSIS — R10.9 RIGHT FLANK PAIN: ICD-10-CM

## 2018-09-21 DIAGNOSIS — R10.11 RUQ PAIN: Primary | ICD-10-CM

## 2018-09-21 LAB
BACTERIA #/AREA URNS HPF: ABNORMAL /HPF
BILIRUB UR QL STRIP: NEGATIVE
CLARITY UR: CLEAR
COLOR UR: YELLOW
GLUCOSE UR QL STRIP: NEGATIVE
HGB UR QL STRIP: ABNORMAL
KETONES UR QL STRIP: NEGATIVE
LEUKOCYTE ESTERASE UR QL STRIP: ABNORMAL
MICROSCOPIC COMMENT: ABNORMAL
NITRITE UR QL STRIP: NEGATIVE
PH UR STRIP: 6 [PH] (ref 5–8)
PROT UR QL STRIP: NEGATIVE
RBC #/AREA URNS HPF: 2 /HPF (ref 0–4)
SP GR UR STRIP: 1.01 (ref 1–1.03)
SQUAMOUS #/AREA URNS HPF: 7 /HPF
URN SPEC COLLECT METH UR: ABNORMAL
WBC #/AREA URNS HPF: 4 /HPF (ref 0–5)

## 2018-09-21 PROCEDURE — 99999 PR PBB SHADOW E&M-EST. PATIENT-LVL IV: CPT | Mod: PBBFAC,,, | Performed by: NURSE PRACTITIONER

## 2018-09-21 PROCEDURE — 3008F BODY MASS INDEX DOCD: CPT | Mod: CPTII,S$GLB,, | Performed by: NURSE PRACTITIONER

## 2018-09-21 PROCEDURE — 81000 URINALYSIS NONAUTO W/SCOPE: CPT | Mod: PO

## 2018-09-21 PROCEDURE — 99213 OFFICE O/P EST LOW 20 MIN: CPT | Mod: S$GLB,,, | Performed by: NURSE PRACTITIONER

## 2018-09-21 PROCEDURE — 87086 URINE CULTURE/COLONY COUNT: CPT

## 2018-09-21 NOTE — PROGRESS NOTES
Subjective:       Patient ID: Tomi Ervin is a 23 y.o. female.    Chief Complaint: Abdominal Pain    Abdominal Pain   This is a recurrent problem. The current episode started 1 to 4 weeks ago. The onset quality is gradual. The problem occurs constantly. The most recent episode lasted 3 weeks. The problem has been waxing and waning. The pain is located in the RUQ and generalized abdominal region. The pain is at a severity of 5/10. The pain is moderate. The quality of the pain is cramping and sharp. Associated symptoms include anorexia and nausea. Pertinent negatives include no arthralgias, belching, diarrhea, dysuria, fever, flatus, hematochezia, hematuria, melena, vomiting or weight loss. The pain is aggravated by being still, certain positions and movement. The pain is relieved by nothing. She has tried acetaminophen, antacids and antibiotics for the symptoms. The treatment provided no relief. Prior diagnostic workup includes ultrasound. Her past medical history is significant for GERD. There is no history of abdominal surgery, colon cancer, Crohn's disease, gallstones, irritable bowel syndrome, pancreatitis, PUD or ulcerative colitis. Patient's medical history includes kidney stones and UTI.   Pt states has seen ob/gyn and has been to ER x 2 for symptoms. US renal on 9/7/18 at [facility] unremarkable; BMP, CBC, lipase, hepatic function panel done at [facility] om 9/11/18 unremarkable; UA showed WBCs, Ca oxy crystals, bacteria; currently taking keflex.   Past Medical History:   Diagnosis Date    Agoraphobia with panic attacks 1/16/2018    Anxiety 1/16/2018    Fibromyalgia     GERD (gastroesophageal reflux disease)     Migraines     Obesity (BMI 35.0-39.9 without comorbidity)     Pyelonephritis 2011    Urticaria 2008    lasted 3 months resolved     Social History     Socioeconomic History    Marital status: Single     Spouse name: Not on file    Number of children: Not on file    Years of education:  Not on file    Highest education level: Not on file   Social Needs    Financial resource strain: Not on file    Food insecurity - worry: Not on file    Food insecurity - inability: Not on file    Transportation needs - medical: Not on file    Transportation needs - non-medical: Not on file   Occupational History    Not on file   Tobacco Use    Smoking status: Current Every Day Smoker     Packs/day: 0.50     Start date: 6/3/2012   Substance and Sexual Activity    Alcohol use: Yes     Alcohol/week: 0.6 oz     Types: 1 Glasses of wine per week    Drug use: No    Sexual activity: Yes     Partners: Male     Birth control/protection: Pill   Other Topics Concern    Not on file   Social History Narrative    Not on file     Past Surgical History:   Procedure Laterality Date    TONSILLECTOMY         Review of Systems   Constitutional: Negative.  Negative for fever and weight loss.   HENT: Negative.    Eyes: Negative.    Respiratory: Negative.    Gastrointestinal: Positive for abdominal pain, anorexia and nausea. Negative for diarrhea, flatus, hematochezia, melena and vomiting.   Endocrine: Negative.    Genitourinary: Negative.  Negative for dysuria and hematuria.   Musculoskeletal: Negative.  Negative for arthralgias.   Skin: Negative.    Allergic/Immunologic: Negative.    Neurological: Negative.    Psychiatric/Behavioral: Negative.        Objective:      Physical Exam   Constitutional: She is oriented to person, place, and time. She appears well-developed and well-nourished.   HENT:   Head: Normocephalic.   Right Ear: External ear normal.   Left Ear: External ear normal.   Mouth/Throat: Oropharynx is clear and moist.   Eyes: Conjunctivae are normal. Pupils are equal, round, and reactive to light.   Neck: Normal range of motion. Neck supple.   Cardiovascular: Regular rhythm and normal heart sounds.   Pulmonary/Chest: Effort normal and breath sounds normal.   Abdominal: Soft. Bowel sounds are normal. There is  tenderness (radiates to right flank) in the right upper quadrant. There is no rigidity, no rebound, no guarding, no CVA tenderness, no tenderness at McBurney's point and negative Cao's sign.   Musculoskeletal: Normal range of motion.   Neurological: She is alert and oriented to person, place, and time.   Skin: Skin is warm and dry. Capillary refill takes 2 to 3 seconds.   Psychiatric: She has a normal mood and affect. Her behavior is normal. Judgment and thought content normal.   Nursing note and vitals reviewed.      Assessment:       1. RUQ pain    2. Right flank pain        Plan:       Tomi was seen today for abdominal pain.    Diagnoses and all orders for this visit:    RUQ pain  Right flank pain  -     US Abdomen Limited; Future  -     Urinalysis  -     Urine culture

## 2018-09-22 LAB — BACTERIA UR CULT: NO GROWTH

## 2018-09-27 ENCOUNTER — TELEPHONE (OUTPATIENT)
Dept: RADIOLOGY | Facility: HOSPITAL | Age: 23
End: 2018-09-27

## 2018-09-28 ENCOUNTER — HOSPITAL ENCOUNTER (OUTPATIENT)
Dept: RADIOLOGY | Facility: HOSPITAL | Age: 23
Discharge: HOME | End: 2018-09-28
Attending: NURSE PRACTITIONER
Payer: COMMERCIAL

## 2018-09-28 DIAGNOSIS — R10.11 RUQ PAIN: ICD-10-CM

## 2018-09-28 PROCEDURE — 76705 ECHO EXAM OF ABDOMEN: CPT | Mod: TC,PO

## 2018-09-28 PROCEDURE — 76705 ECHO EXAM OF ABDOMEN: CPT | Mod: 26,,, | Performed by: RADIOLOGY

## 2018-10-24 ENCOUNTER — TELEPHONE (OUTPATIENT)
Dept: NEUROLOGY | Facility: CLINIC | Age: 23
End: 2018-10-24

## 2018-10-24 ENCOUNTER — OFFICE VISIT (OUTPATIENT)
Dept: NEUROLOGY | Facility: CLINIC | Age: 23
End: 2018-10-24
Payer: COMMERCIAL

## 2018-10-24 ENCOUNTER — LAB VISIT (OUTPATIENT)
Dept: LAB | Facility: HOSPITAL | Age: 23
End: 2018-10-24
Attending: PSYCHIATRY & NEUROLOGY
Payer: COMMERCIAL

## 2018-10-24 VITALS
SYSTOLIC BLOOD PRESSURE: 128 MMHG | WEIGHT: 223.13 LBS | HEART RATE: 95 BPM | RESPIRATION RATE: 16 BRPM | BODY MASS INDEX: 39.54 KG/M2 | DIASTOLIC BLOOD PRESSURE: 79 MMHG | HEIGHT: 63 IN

## 2018-10-24 DIAGNOSIS — G43.009 MIGRAINE WITHOUT AURA AND WITHOUT STATUS MIGRAINOSUS, NOT INTRACTABLE: ICD-10-CM

## 2018-10-24 DIAGNOSIS — M79.7 FIBROMYALGIA: ICD-10-CM

## 2018-10-24 DIAGNOSIS — R20.0 NUMBNESS AND TINGLING OF BOTH FEET: ICD-10-CM

## 2018-10-24 DIAGNOSIS — R20.2 NUMBNESS AND TINGLING OF BOTH FEET: ICD-10-CM

## 2018-10-24 LAB
ALBUMIN SERPL BCP-MCNC: 3 G/DL
ALP SERPL-CCNC: 73 U/L
ALT SERPL W/O P-5'-P-CCNC: 14 U/L
ANION GAP SERPL CALC-SCNC: 8 MMOL/L
AST SERPL-CCNC: 13 U/L
BILIRUB SERPL-MCNC: 0.2 MG/DL
BUN SERPL-MCNC: 6 MG/DL
CALCIUM SERPL-MCNC: 9.4 MG/DL
CHLORIDE SERPL-SCNC: 108 MMOL/L
CO2 SERPL-SCNC: 21 MMOL/L
CREAT SERPL-MCNC: 0.7 MG/DL
ERYTHROCYTE [SEDIMENTATION RATE] IN BLOOD BY WESTERGREN METHOD: 23 MM/HR
EST. GFR  (AFRICAN AMERICAN): >60 ML/MIN/1.73 M^2
EST. GFR  (NON AFRICAN AMERICAN): >60 ML/MIN/1.73 M^2
ESTIMATED AVG GLUCOSE: 94 MG/DL
GLUCOSE SERPL-MCNC: 113 MG/DL
HBA1C MFR BLD HPLC: 4.9 %
POTASSIUM SERPL-SCNC: 3.5 MMOL/L
PROT SERPL-MCNC: 6.9 G/DL
RHEUMATOID FACT SERPL-ACNC: <10 IU/ML
SODIUM SERPL-SCNC: 137 MMOL/L
TSH SERPL DL<=0.005 MIU/L-ACNC: 3.03 UIU/ML

## 2018-10-24 PROCEDURE — 80053 COMPREHEN METABOLIC PANEL: CPT

## 2018-10-24 PROCEDURE — 86431 RHEUMATOID FACTOR QUANT: CPT

## 2018-10-24 PROCEDURE — 83520 IMMUNOASSAY QUANT NOS NONAB: CPT | Mod: 59

## 2018-10-24 PROCEDURE — 82300 ASSAY OF CADMIUM: CPT

## 2018-10-24 PROCEDURE — 86592 SYPHILIS TEST NON-TREP QUAL: CPT

## 2018-10-24 PROCEDURE — 86235 NUCLEAR ANTIGEN ANTIBODY: CPT

## 2018-10-24 PROCEDURE — 3008F BODY MASS INDEX DOCD: CPT | Mod: CPTII,S$GLB,, | Performed by: PSYCHIATRY & NEUROLOGY

## 2018-10-24 PROCEDURE — 86334 IMMUNOFIX E-PHORESIS SERUM: CPT

## 2018-10-24 PROCEDURE — 86703 HIV-1/HIV-2 1 RESULT ANTBDY: CPT

## 2018-10-24 PROCEDURE — 36415 COLL VENOUS BLD VENIPUNCTURE: CPT | Mod: PO

## 2018-10-24 PROCEDURE — 82607 VITAMIN B-12: CPT

## 2018-10-24 PROCEDURE — 86038 ANTINUCLEAR ANTIBODIES: CPT

## 2018-10-24 PROCEDURE — 85651 RBC SED RATE NONAUTOMATED: CPT | Mod: PO

## 2018-10-24 PROCEDURE — 99999 PR PBB SHADOW E&M-EST. PATIENT-LVL III: CPT | Mod: PBBFAC,,, | Performed by: PSYCHIATRY & NEUROLOGY

## 2018-10-24 PROCEDURE — 84165 PROTEIN E-PHORESIS SERUM: CPT

## 2018-10-24 PROCEDURE — 84165 PROTEIN E-PHORESIS SERUM: CPT | Mod: 26,,, | Performed by: PATHOLOGY

## 2018-10-24 PROCEDURE — 83921 ORGANIC ACID SINGLE QUANT: CPT

## 2018-10-24 PROCEDURE — 83036 HEMOGLOBIN GLYCOSYLATED A1C: CPT

## 2018-10-24 PROCEDURE — 84443 ASSAY THYROID STIM HORMONE: CPT

## 2018-10-24 PROCEDURE — 86618 LYME DISEASE ANTIBODY: CPT

## 2018-10-24 PROCEDURE — 84425 ASSAY OF VITAMIN B-1: CPT

## 2018-10-24 PROCEDURE — 99205 OFFICE O/P NEW HI 60 MIN: CPT | Mod: S$GLB,,, | Performed by: PSYCHIATRY & NEUROLOGY

## 2018-10-24 PROCEDURE — 86334 IMMUNOFIX E-PHORESIS SERUM: CPT | Mod: 26,,, | Performed by: PATHOLOGY

## 2018-10-24 NOTE — PROGRESS NOTES
Headache questionnaire    1. When did your Headaches start?    8 years old (2003)      2. Where are your headaches located?   All over, (neck, base, front/forehead)       3. Your headache's characteristics:   Excruciating, Pressure, Throbbing, Pounding      4. How long does the headache last?   Hours-days      5. How often does the headache occur?   Monthly       6. Are your headaches preceded or accompanied by other symptoms? yes   If yes, please describe.  Blurred vision, nausea, confusion       7. Does the headache awaken you at night? yes   If so, how often? Rare, occasional         8. Please jory the word that best describes your headache's intensity:    severe      9. Using a scale of 1 through 10, with 0 = no pain and 10 = the worst pain:   What score is your headache now? 3   What score is your headache at its worst? 10   What score is your headache at its best? 1        10. Possible associated headache symptoms:  [x]  Sensitivity to light  [x] Dizziness  [] Nasal or sinus pressure/ pain   [x] Sensitivity to noise  [] Vertigo  [x] Problems with concentration  [] Sensitivity to smells  [x] Ringing in ears  [x] Problems with memory    [x] Blurred vision  [x] Irritability  [] Problems with task completion   [] Double vision  [x] Anger  []  Problems with relaxation  [] Loss of appetite  [x] Anxiety  [x] Neck tightness, Neck pain  [x] Nausea   [] Nasal congestion  [] Vomiting         11. Headache improving factors:  [x] Sleep  [x] Heat  [x] Darkness  [] Ice  [] Local pressure [] Menses (period)  [] Massage   [x] Medications:        12. Headache worsening factors:   [x] Fatigue [] Sneezing  [x] Changes in Weather  [x] Light [] Bending Over [x] Stress  [x] Noise [] Ovulation  [] Multiple Sclerosis Flare-Up  [] Smells  [] Menses  [] Food   [] Coughing [] Alcohol      13. Number of caffeinated drinks per day: 1      14. Number of diet drinks per day:  0      15. Have you seen any other [facility] within the  last 3 years?  No      Please Check any Medications Tried for Headache    AED Neuromodulators  MAOIs  Ergot Alkaloids    Acetazolamide (Diamox) [] Phenelzine (Nardil) [] Dihydroergotamine (Migranal) []   Carbamazepine (Tegretol) [] Tranylcypromine (Parnate) [] Ergotamine (Ergomar) []   Gabapentin (Neurontin) [] Antihistamine/Serotonergic  Triptans    Lacosamide (Vimpat) [] Cyproheptadine (Periactin) [] Almotriptan (Axert) []   Lamotrigine (Lamictal) [] Antihypertensives  Eletriptan (Relpax) []   Levatiracetam (Keppra) [] Atenolol (Tenormin) [] Frovatriptan (Frova) []   Oxcarbazepine (Trileptal) [] Bisoprostol (Zebeta) [] Naratriptan (Amerge) []   Phenobarbital [] Candesartan (Atacand) [] Rizatriptan (Maxalt) []   Phenytoin (Dilantin) [] Diltiazem (Cardizem) [] Sumatriptan (Imitrex) []   Pregabalin (Lyrica) [x] Lisinopril (Prinivil, Zestril) [] Zolmitriptan (Zomig) []   Primidone (Mysoline) [] Metoprolol (Toprol) [] Combo Abortives    Tiagabine (Gabatril) [] Nadolol (Corgard) [] Butalbital and Acetaminophen (Bupap) []   Topiramate (Topamax)  (Trokendi) [] Nicardipine (Cardene) []     Vigabatrin (Sabril) [] Nimodipine (Nimotop) [] Butalbital, Acetaminophen, and caffeine (Fioricet) [x]   Valproic Acid (Depakote) (Divalproex Sodium) [] Propranolol (Inderal) []     Zonisamide (Zonegran) [] Telmisartan (Micardis) [] Butalbital, Aspirin, and caffeine (Fiorinal) []   Benzodiazepines  Timolol (Blocadren) []     Alprazolam (Xanax) [] Verapamil (Calan, Verelan) [] Butalbital, Caffeine, Acetaminophen, and Codeine (Fioricet with Codeine) []   Diazepam (Valium) [] NSAIDs      Lorazepam (Ativan) [] Acetaminophen (Tylenol) [x]     Clonazepam (Klonopin) [] Acetylsalicylic Acid (Aspirin) [] Butalbital, Caffeine, Aspirin, and Codeine  (Fiorinal with Codeine) []   Antidepressants  Diclofenac (Cambia) []     Amitriptyline (Elavil) [x] Ibuprofen (Motrin) [x]     Desipramine (Norpramin) [] Indomethacin (Indocin) [] Aspirin, Caffeine, and  Acetaminophen (Excedrin) (Goodys) [x]   Doxepin (Sinequan) [] Ketoprofen (Orudis) []     Fluoxetine (Prozac) [] Ketorolac (Toradol) [x] Acetaminophen, Dichloralphenazone, and Isometheptene (Midrin) []   Imipramine (Tofranil) [] Naproxen (Anaprox) (Aleve) [x]     Nortriptyline (Pamelor) [] Meclofenamic Acid (Meclomen) []     Venlafaxine (Effexor) [] Meloxicam (Mobic) [] Aspirin, Salicylamide, and Caffeine (BC Powder) []       Please Check If You Have Had Any Of These Symptoms In The Last Week    General/Constitutional: Unintentional weight loss [] Change in Appetite  []   Eyes/Vision: Change in Vision [x] Double Vision []   ENT: Frequent nose bleeds [] Ringing in the Ears [x]   Respiratory: Cough [] Wheezing []   Cardovascular: Chest Pain [] Palpitations [x]   Gastrointestinal: Jaundice [] Nausea/Vomiting []   Genitourinary: Incontinence [x] Burning with urination []   Hemotologic/Lymphatic: Easy Brusing/Bleeding [] Night Sweats [x]   Neurological: Numbness [x] Weakness []   Endocrine: Fatigue [x] Heat/Cold Intolerance  [x]   Allergy/Immunologic: Fevers [] Chills []   Musculoskeletal: Muscle Pain [x] Joint Pain [x]   Psychiatric: Thoughts of harming self/others [] Depression []   Integumentary: Rashes [] Sores that do not heal [x]

## 2018-10-24 NOTE — LETTER
October 24, 2018      Julio Cabrera MD  [address] [facility] [address]           [facility] [place]  [address] [facility] [address] [place] [address]  Phone: [phone]  Fax: [phone]          Patient: Tomi Ervin   MR Number: [id number]   YOB: 1995   Date of Visit: 10/24/2018       Dear Dr. Julio Cabrera:    Thank you for referring Tomi Ervin to me for evaluation. Attached you will find relevant portions of my assessment and plan of care.    If you have questions, please do not hesitate to call me. I look forward to following Tomi Ervin along with you.    Sincerely,    Shahnaz Miranda MD    Enclosure  CC:  No Recipients    If you would like to receive this communication electronically, please contact [email or web address] or [phone] to request more information on [employer] access.    For providers and/or their staff who would like to refer a patient to [facility], please contact us through our one-stop-shop provider referral line, [facility], at [phone].    If you feel you have received this communication in error or would no longer like to receive these types of communications, please e-mail [email or web address]

## 2018-10-25 LAB
ALBUMIN SERPL ELPH-MCNC: 3.23 G/DL
ALPHA1 GLOB SERPL ELPH-MCNC: 0.43 G/DL
ALPHA2 GLOB SERPL ELPH-MCNC: 0.92 G/DL
ANA SER QL IF: NORMAL
ANTI-SSA ANTIBODY: 0.62 EU
ANTI-SSA INTERPRETATION: NEGATIVE
B-GLOBULIN SERPL ELPH-MCNC: 0.87 G/DL
GAMMA GLOB SERPL ELPH-MCNC: 0.85 G/DL
HIV 1+2 AB+HIV1 P24 AG SERPL QL IA: NEGATIVE
INTERPRETATION SERPL IFE-IMP: NORMAL
PROT SERPL-MCNC: 6.3 G/DL
RPR SER QL: NORMAL
VIT B12 SERPL-MCNC: 187 NG/L

## 2018-10-25 NOTE — PROGRESS NOTES
Subjective:       Patient ID: Tomi Ervin is a 23 y.o. female.    Chief Complaint: Headache    HPI   The patient is  A pleasant 24 y/o female presenting for evaluation of headache. She is currently 20 weeks+ pregnant, . She has suffered with headaches for at least 10 years. Over the years she has tried Lyrica, Elavil Cymbalta, Lexapro, Zoloft all for Fibromyalgia. In addition to the headaches she complains of bilateral hand and feet numbness. She questions the diagnosis of Fibromyalgia and has a schedule appointment with Rheumatology coming up. This is intermittent. The feet are noted to be numb upon awakening. She has FHx of diabetes (mother).   Please see details of headache characteristics below.  Headache questionnaire    1. When did your Headaches start?    8 years old ()      2. Where are your headaches located?   All over, (neck, base, front/forehead)       3. Your headache's characteristics:   Excruciating, Pressure, Throbbing, Pounding      4. How long does the headache last?   Hours-days      5. How often does the headache occur?   Monthly       6. Are your headaches preceded or accompanied by other symptoms? yes   If yes, please describe.  Blurred vision, nausea, confusion       7. Does the headache awaken you at night? yes   If so, how often? Rare, occasional         8. Please jory the word that best describes your headache's intensity:    severe      9. Using a scale of 1 through 10, with 0 = no pain and 10 = the worst pain:   What score is your headache now? 3   What score is your headache at its worst? 10   What score is your headache at its best? 1        10. Possible associated headache symptoms:  [x]  Sensitivity to light  [x] Dizziness  [] Nasal or sinus pressure/ pain   [x] Sensitivity to noise  [] Vertigo  [x] Problems with concentration  [] Sensitivity to smells  [x] Ringing in ears  [x] Problems with memory    [x] Blurred vision  [x] Irritability  [] Problems with task  completion   [] Double vision  [x] Anger  []  Problems with relaxation  [] Loss of appetite  [x] Anxiety  [x] Neck tightness, Neck pain  [x] Nausea   [] Nasal congestion  [] Vomiting         11. Headache improving factors:  [x] Sleep  [x] Heat  [x] Darkness  [] Ice  [] Local pressure [] Menses (period)  [] Massage   [x] Medications:        12. Headache worsening factors:   [x] Fatigue [] Sneezing  [x] Changes in Weather  [x] Light [] Bending Over [x] Stress  [x] Noise [] Ovulation  [] Multiple Sclerosis Flare-Up  [] Smells  [] Menses  [] Food   [] Coughing [] Alcohol      13. Number of caffeinated drinks per day: 1      14. Number of diet drinks per day:  0       Review of Systems            Past Medical History:   Diagnosis Date    Agoraphobia with panic attacks 1/16/2018    Anxiety 1/16/2018    Fibromyalgia     GERD (gastroesophageal reflux disease)     Migraines     Obesity (BMI 35.0-39.9 without comorbidity)     Pyelonephritis 2011    Urticaria 2008    lasted 3 months resolved     Past Surgical History:   Procedure Laterality Date    TONSILLECTOMY       Family History   Problem Relation Age of Onset    Diabetes Mother     Mental illness Mother         gambling problem    Uterine cancer Paternal Grandmother     Cancer Paternal Grandmother     Mental illness Sister         borderline personality disorder     Social History     Socioeconomic History    Marital status: Single     Spouse name: Not on file    Number of children: Not on file    Years of education: Not on file    Highest education level: Not on file   Social Needs    Financial resource strain: Not on file    Food insecurity - worry: Not on file    Food insecurity - inability: Not on file    Transportation needs - medical: Not on file    Transportation needs - non-medical: Not on file   Occupational History    Not on file   Tobacco Use    Smoking status: Current Every Day Smoker     Packs/day: 0.50     Start date: 6/3/2012    Substance and Sexual Activity    Alcohol use: Yes     Alcohol/week: 0.6 oz     Types: 1 Glasses of wine per week    Drug use: No    Sexual activity: Yes     Partners: Male     Birth control/protection: Pill   Other Topics Concern    Not on file   Social History Narrative    Not on file     Review of patient's allergies indicates:  No Known Allergies    Current Outpatient Medications:     acetaminophen (TYLENOL) 500 MG tablet, Take 500 mg by mouth every 6 (six) hours as needed for Pain., Disp: , Rfl:     esomeprazole (NEXIUM) 20 MG capsule, Take 1 capsule (20 mg total) by mouth once daily., Disp: 30 capsule, Rfl: 11    prenatal 25/iron fum/folic/dha (PRENATAL-1 ORAL), Take by mouth., Disp: , Rfl:     loratadine (CLARITIN) 10 mg tablet, Take 10 mg by mouth once daily., Disp: , Rfl:       Objective:      Vitals:    10/24/18 0805   BP: 128/79   Pulse: 95   Resp: 16     Body mass index is 39.52 kg/m².    Physical Exam    Constitutional:   She appears well-developed and well-nourished. She is well groomed    HENT:    Head: Atraumatic, oral and nasal mucosa intact  Eyes: Conjunctivae and EOM are normal. Pupils are equal, round, and reactive to light OU  Neck: Neck supple. No thyromegaly present  Cardiovascular: Normal rate and normal heart sounds  No murmur heard  Pulmonary/Chest: Effort normal and breath sounds normal  Skin: Skin is warm and dry  Psychiatric: Normal mood and affect     Neuro exam:    Mental status:  Awake, attentive, Alert, oriented to self, place, year and month  Language function is intact    Cranial Nerves:  Smell was not formally evaluated  Cranial Nerves II - XII: intact  Pursuits were smooth, normal saccades, no nystagmus OU  Funduscopic exam - disc were flat and pink, no exudates or hemorrhages OU  Motor - facial movement was symmetrical and normal     Palate moved well and was symmetrical with normal palatal and oral sensation  Tongue movements were full    Coordination:     Rapid  alternating movements and rapid finger tapping - normal bilaterally  Finger to nose - normal and symmetric bilaterally   Heel to shin test - normal and symmetric bilaterally   Arm roll - smooth and symmetric   No intentional or positional tremor.     Motor:  Normal muscle bulk and symmetry. No fasciculations were noted    No pronator drift  Strength5/5 bilaterally     Reflexes:  Tendon reflexes were 2 + at biceps, triceps, brachioradialis, patellar, and Achilles bilaterally  On plantar stimulation toes were down going bilaterally  No clonus was noted     Sensory: Intact to light touch, pin prick in all extremities, except 25% decrease sensation to PP in the feet with a level at the ankles.    Gait: Romberg absent. Normal gait. Normal arm swing and turns. Good tandem    Review of Data:  Lab Results   Component Value Date     10/24/2018    K 3.5 10/24/2018     10/24/2018    CO2 21 (L) 10/24/2018    BUN 6 10/24/2018    CREATININE 0.7 10/24/2018     (H) 10/24/2018    HGBA1C 4.9 10/24/2018    AST 13 10/24/2018    ALT 14 10/24/2018    ALBUMIN 3.0 (L) 10/24/2018    PROT 6.9 10/24/2018    BILITOT 0.2 10/24/2018    CHOL 177 06/08/2018    HDL 53 06/08/2018    LDLCALC 111.0 06/08/2018    TRIG 65 06/08/2018       Lab Results   Component Value Date    WBC 9.17 09/11/2018    HGB 11.2 (L) 09/11/2018    HCT 34.5 (L) 09/11/2018    MCV 80 (L) 09/11/2018     09/11/2018       Lab Results   Component Value Date    TSH 3.028 10/24/2018           Assessment and Plan   Numbness and tingling of both feet. WIll proceed with work up for peripheral neuropathy  -     GABRIELA; Future; Expected date: 10/24/2018  -     B. burgdorferi Abs (Lyme Disease); Future; Expected date: 10/24/2018  -     Comprehensive metabolic panel; Future; Expected date: 10/24/2018  -     Ganglioside Antibody Panel, Serum; Future; Expected date: 10/24/2018  -     Heavy Metals Screen, Blood (Quantitative); Future; Expected date: 10/24/2018  -      HIV-1 and HIV-2 antibodies; Future; Expected date: 10/24/2018  -     Immunofixation electrophoresis; Future; Expected date: 10/24/2018  -     Protein electrophoresis, serum; Future; Expected date: 10/24/2018  -     Rheumatoid factor; Future; Expected date: 10/24/2018  -     RPR; Future; Expected date: 10/24/2018  -     Sedimentation rate; Future; Expected date: 10/24/2018  -     Sjogrens syndrome-A extractable nuclear antibody; Future; Expected date: 10/24/2018  -     Vitamin B1; Future; Expected date: 10/24/2018  -     Vitamin B12 Deficiency Panel; Future; Expected date: 10/24/2018  -     HEMOGLOBIN A1C; Future; Expected date: 10/24/2018  -     TSH; Future; Expected date: 10/24/2018  -     EMG W/ ULTRASOUND AND NERVE CONDUCTION TEST 2 Extremities; Future    Fibromyalgia  -     Ambulatory consult to Physical Therapy. Keep appointment with Rheumatology    Pregancy   followed by OB/GYN    Migraine without aura, non intractable  Our options are limited during pregnancy. Treat only acutely, no prevention at this time    Counseling:  More than 50% of the 60 minute encounter was spent face to face counseling the patient regarding current status and future plan of care as well as side of the medications. All questions were answered to patient's satisfaction            Linnea Miranda M.D  Medical Director, Headache and Facial Pain  [facility]

## 2018-10-26 ENCOUNTER — CLINICAL SUPPORT (OUTPATIENT)
Dept: REHABILITATION | Facility: HOSPITAL | Age: 23
End: 2018-10-26
Attending: PSYCHIATRY & NEUROLOGY
Payer: COMMERCIAL

## 2018-10-26 DIAGNOSIS — R53.1 WEAKNESS: ICD-10-CM

## 2018-10-26 DIAGNOSIS — Z74.09 DECREASED FUNCTIONAL MOBILITY AND ENDURANCE: ICD-10-CM

## 2018-10-26 LAB
B BURGDOR AB SER IA-ACNC: 0.03 INDEX VALUE
DEPRECATED GD1B DISIALYL IGG SER QL: NORMAL
DEPRECATED GD1B DISIALYL IGM SER QL: NORMAL
GANGLIIOSIDE AB COMMENT: NORMAL
GM1 ASIALO IGG SER QL: NORMAL
GM1 ASIALO IGM SER QL: NORMAL
GM1 GANGL IGG SER QL: NORMAL
GM1 GANGL IGM SER QL: NORMAL
PATHOLOGIST INTERPRETATION IFE: NORMAL
PATHOLOGIST INTERPRETATION SPE: NORMAL

## 2018-10-26 PROCEDURE — 97163 PT EVAL HIGH COMPLEX 45 MIN: CPT | Mod: PO | Performed by: PHYSICAL THERAPIST

## 2018-10-26 PROCEDURE — 97110 THERAPEUTIC EXERCISES: CPT | Mod: PO | Performed by: PHYSICAL THERAPIST

## 2018-10-26 NOTE — PATIENT INSTRUCTIONS
Home Exercise Program: 10/26/2018    KEGELS WHILE LAYING DOWN  1. Lay on your back comfortably with legs and buttocks relaxed.  2. Contract and LIFT the pelvic floor muscles as if you're trying to stop the stream of urine and passage of gas.  3. Hold LIFT for 10 seconds without holding breath. You should be able to talk out loud the entire time.  4. Release and DROP the pelvic floor muscles for 20 seconds.  5. Repeat 20 times, 2 sets per day. Spread throughout the day.    No kegels while urinating!
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
## 2018-10-27 LAB
METHYLMALONATE SERPL-SCNC: 0.09 NMOL/ML
VIT B1 SERPL-MCNC: 51 UG/L (ref 38–122)

## 2018-11-02 ENCOUNTER — CLINICAL SUPPORT (OUTPATIENT)
Dept: REHABILITATION | Facility: HOSPITAL | Age: 23
End: 2018-11-02
Attending: PSYCHIATRY & NEUROLOGY
Payer: COMMERCIAL

## 2018-11-02 DIAGNOSIS — Z74.09 DECREASED FUNCTIONAL MOBILITY AND ENDURANCE: ICD-10-CM

## 2018-11-02 DIAGNOSIS — R53.1 WEAKNESS: ICD-10-CM

## 2018-11-02 LAB
ARSENIC BLD-MCNC: <1 NG/ML (ref 0–12)
CADMIUM BLD-MCNC: 0.4 NG/ML (ref 0–4.9)
CITY: NORMAL
COUNTY: NORMAL
GUARDIAN FIRST NAME: NORMAL
GUARDIAN LAST NAME: NORMAL
HOME PHONE: NORMAL
LEAD BLD-MCNC: <1 MCG/DL (ref 0–4.9)
MERCURY BLD-MCNC: <1 NG/ML (ref 0–9)
RACE: NORMAL
STATE: NORMAL
STREET ADDRESS: NORMAL
VENOUS/CAPILLARY: NORMAL
ZIP: NORMAL

## 2018-11-02 PROCEDURE — 97110 THERAPEUTIC EXERCISES: CPT | Mod: PO | Performed by: PHYSICAL THERAPIST

## 2018-11-02 PROCEDURE — 97112 NEUROMUSCULAR REEDUCATION: CPT | Mod: PO | Performed by: PHYSICAL THERAPIST

## 2018-11-02 NOTE — PROGRESS NOTES
"                            Physical Therapy Daily Treatment Note     Name: Tomi Ervin  Clinic Number: [id number]    Therapy Diagnosis:   Encounter Diagnoses   Name Primary?    Weakness     Decreased functional mobility and endurance      Physician: Shahnaz Miranda MD    Physician Orders: PT Eval and Treat   Medical Diagnosis: fibromyalgia  Evaluation Date: 10/26/2018  Authorization: (30) per calendar year  Plan of Care Certification Period: 12/21/18     Today's Date: 11/2/2018  Visit #: 2/8  Time In: 0900  Time Out: 0950  Total Billable Time: 45 minutes     Precautions: Standard, fibromyalgia  18 weeks pregnant, 4 y.o. daughter    Subjective     Pt reports: she feels about the same. No significant changes since last visit. Pt reports her pelvis is tilted back more than normal, which was "discovered 6 years ago." She thinks she may have MS - a white spot on her brain being discovered ~10 years ago. No further work up as been done since. No issues with HEP.   Response to previous treatment: no excessive pain or soreness  Functional change: too early to assess    Pain: 1/10  Location: lumbar/overall    Objective     TREATMENT  Tomi participated in therapeutic exercise to develop strength, endurance and core stabilization for 40 minutes, including:  Upright bike seat 6 L1 x5 min  SLR 2x10 bilat  Post pelvic tilt 10x5"  Hooklying add 20x5"  Clams 2x10 bilat  Supine glute set 10x10"  Squats with UE support x10  Standing TA set with UEs on orange SB on table pressing down into ball 20x5"  UE ext, horiz rows, IR, ER red Tband x20 ea bilat  Seated on red SB marching 3x1 min - blue SB next visit  Genaro 5x10" - - not performed, d/c to HEP    Tomi participated in neuromuscular re-education activities to improve Balance, Coordination, Kinesthetic, Sense and Proprioception for 10 minutes, including:  Tandem stance 3x30" ea - on foam next visit  Narrowed stance on foam EC 3x30"      EDUCATION  Education " provided:   - Progress towards goals   - Role of therapy   - Activity modification    Written Home Exercises Provided:   Pt was not provided with a written copy of exercises to perform at home. Tomi demonstrated good  understanding of the education provided.     See EMR under Patient Instructions for exercises provided 10/26/18.    Assessment     Pt tolerated today's tx well with no visible or reported adverse affects. Pt reported max soreness at end of today's visit, so PT ended early. Will determine pt's tolerance for PT and endurance level at next visit prior to adding to HEP.     Tomi is progressing well towards her goals. Pt will continue to benefit from skilled outpatient physical therapy to address the deficits listed in the problem list box on initial evaluation, provide pt/family education and to maximize pt's level of independence in the home and community environment.   Pt prognosis is Good.     Plan of care discussed with patient: Yes  Pt's spiritual, cultural and educational needs considered and patient is agreeable to the plan of care and goals as stated below:     Anticipated Barriers for therapy: far commute    GOALS  Short Term Goals:  4 weeks  1. Pt will report <4/10 pain to improve activity tolerance. Progressing, not met   2. Pt will increase MMT by 1/3 grade in order to improve functional stability and strength. Progressing, not met   3. Pt will tolerate HEP to improve impairments and independence with ADL's. Progressing, not met      Long Term Goals: 8 weeks  1. Pt will report <2/10 pain to improve activity tolerance. Progressing, not met   2. Pt will report at least CJ on FOTO NOC-MUSCULO-SKELETAL DISORDER to demonstrate a decrease in disability and improvement in independence with functional activities. Progressing, not met   3. Pt to score 15 on 30 Second Sit<>Stand Test for improved functional strength. Progressing, not met   4. Pt to perform squat with no compensations or UE support for  improved body mechanics throughout pregnancy. Progressing, not met   5. Pt will be I with HEP for self-management of symptoms. Progressing, not met     Plan     Continue with established POC working toward PT goals.    Jordyn Blank, PT

## 2018-11-07 ENCOUNTER — CLINICAL SUPPORT (OUTPATIENT)
Dept: REHABILITATION | Facility: HOSPITAL | Age: 23
End: 2018-11-07
Attending: PSYCHIATRY & NEUROLOGY
Payer: COMMERCIAL

## 2018-11-07 DIAGNOSIS — R53.1 WEAKNESS: ICD-10-CM

## 2018-11-07 DIAGNOSIS — Z74.09 DECREASED FUNCTIONAL MOBILITY AND ENDURANCE: ICD-10-CM

## 2018-11-07 PROCEDURE — 97110 THERAPEUTIC EXERCISES: CPT | Mod: PO

## 2018-11-07 NOTE — PROGRESS NOTES
"                            Physical Therapy Daily Treatment Note     Name: Tomi Ervin  Clinic Number: [id number]    Therapy Diagnosis:   Encounter Diagnoses   Name Primary?    Weakness     Decreased functional mobility and endurance      Physician: Shahnaz Miranda MD    Physician Orders: PT Eval and Treat   Medical Diagnosis: fibromyalgia  Evaluation Date: 10/26/2018  Authorization: (30) per calendar year  Plan of Care Certification Period: 12/21/18     Today's Date: 11/7/2018  Visit #: 3/8    Time In: 0900  Time Out: 0950  Total Billable Time: 45 minutes     Precautions: Standard, fibromyalgia  18 weeks pregnant, 4 y.o. daughter    Subjective     Pt reports: some soreness after exercise from last treatment, but no pain reported today. Patient reports that she is ready to exercise despite soreness from previous session.   Response to previous treatment: muscle soreness the day after treatment but this resolved  Functional change: too early to determine    Pain: 0/10  Location: lumbar/overall    Objective     TREATMENT  Tomi participated in therapeutic exercise to develop strength, endurance and core stabilization for 40 minutes, including:    Upright bike seat 6 L1 x 5 min  SLR 2x10 bilat  Post pelvic tilt 20x5"  Hooklying add 20x5"  Clams 2x10 bilat (yellow TB)  Supine glute set 10x10"    Squats with UE support x10  Standing TA set with UEs on orange SB on table pressing down into ball 20x5"  UE ext, horiz rows, IR, ER red Tband x20 ea bilat  Seated on blue SB marching 3x1 min     Tomi participated in neuromuscular re-education activities to improve Balance, Coordination, Kinesthetic, Sense and Proprioception for 5 minutes, including:  Tandem stance 3x30" ea - on foam   Narrowed stance on foam EC 3x30"    EDUCATION  Education provided:   - post exercise soreness    Written Home Exercises Provided: Patient instructed to continue previously issued HEP. Pt was not provided with a written copy of " exercises to perform at home. Tomi demonstrated good  understanding of the education provided.     See EMR under Patient Instructions for exercises provided 10/26/18.    Assessment     Tomi tolerated treatment very well today. Patient able to complete all exercises without complaints of pain but training effect easily achieved in gluteal and rotator cuff musculature. Patient able to progress resistance of clamshells without difficulty. Good tolerance to tandem stance on air ex pad with no loss of balance but increased postural sway noted.     Tomi is progressing well towards her goals. Pt will continue to benefit from skilled outpatient physical therapy to address the deficits listed in the problem list box on initial evaluation, provide pt/family education and to maximize pt's level of independence in the home and community environment.   Pt prognosis is Good.     Plan of care discussed with patient: Yes  Pt's spiritual, cultural and educational needs considered and patient is agreeable to the plan of care and goals as stated below:     Anticipated Barriers for therapy: far commute    GOALS  Short Term Goals:  4 weeks  1. Pt will report <4/10 pain to improve activity tolerance. Progressing, not met   2. Pt will increase MMT by 1/3 grade in order to improve functional stability and strength. Progressing, not met   3. Pt will tolerate HEP to improve impairments and independence with ADL's. Progressing, not met      Long Term Goals: 8 weeks  1. Pt will report <2/10 pain to improve activity tolerance. Progressing, not met   2. Pt will report at least CJ on FOTO NOC-MUSCULO-SKELETAL DISORDER to demonstrate a decrease in disability and improvement in independence with functional activities. Progressing, not met   3. Pt to score 15 on 30 Second Sit<>Stand Test for improved functional strength. Progressing, not met   4. Pt to perform squat with no compensations or UE support for improved body mechanics throughout  pregnancy. Progressing, not met   5. Pt will be I with HEP for self-management of symptoms. Progressing, not met     Plan     Continue with established POC working toward PT goals.    Tootie Granados, PTA

## 2018-11-21 ENCOUNTER — CLINICAL SUPPORT (OUTPATIENT)
Dept: REHABILITATION | Facility: HOSPITAL | Age: 23
End: 2018-11-21
Attending: PSYCHIATRY & NEUROLOGY
Payer: COMMERCIAL

## 2018-11-21 DIAGNOSIS — Z74.09 DECREASED FUNCTIONAL MOBILITY AND ENDURANCE: ICD-10-CM

## 2018-11-21 DIAGNOSIS — R53.1 WEAKNESS: ICD-10-CM

## 2018-11-21 PROCEDURE — 97110 THERAPEUTIC EXERCISES: CPT | Mod: PO | Performed by: PHYSICAL THERAPIST

## 2018-11-21 NOTE — PROGRESS NOTES
"                            Physical Therapy Daily Treatment Note     Name: Tomi Ervin  Clinic Number: [id number]    Therapy Diagnosis:   Encounter Diagnoses   Name Primary?    Weakness     Decreased functional mobility and endurance      Physician: Shahnaz Miranda MD    Physician Orders: PT Eval and Treat   Medical Diagnosis: fibromyalgia  Evaluation Date: 10/26/2018  Authorization: (30) per calendar year  Plan of Care Certification Period: 12/21/18     Today's Date: 11/21/2018  Visit #: 4/8    Time In: 0850  Time Out: 0950  Total Billable Time: 45 minutes     Precautions: Standard, fibromyalgia  21 weeks pregnant, 4 y.o. daughter    Subjective     Pt reports: she is having random sharp pains abdominally that don't have a pattern, specific location, or exacerbation factor that she can identify. The doctor thinks it's round ligament pain but did not do an exam of any kind, so pt is hesitant about this dx. Pain is 9/10 when it comes on. She has not been compliant with HEP secondary to pain.   Response to previous treatment: muscle soreness the day after treatment but this resolved  Functional change: too early to determine    Pain: 1/10  Location: lumbar/overall    Objective     Standing SIJ compression: positive for pain reduction  Standing belly lift: negative for pain reduction    TREATMENT  Tomi participated in therapeutic exercise to develop strength, endurance and core stabilization for 50 minutes, including:     Upright bike seat 6 L1 x 5 min    *EOB elevated with mat exercises  SLR 2x10 bilat  Post pelvic tilt 20x5"  Hooklying add 20x5"  Clams 2x10 bilat (red TBand)  Supine glute set 10x10"    Squats with UE support x10  Standing TA set with UEs on orange SB on table pressing down into ball 20x5"  UE ext, horiz rows, IR, ER red Tband x20 ea bilat  Seated on blue SB marching 3x1 min     Tomi participated in neuromuscular re-education activities to improve Balance, Coordination, Kinesthetic, " "Sense and Proprioception for 10 minutes, including:  Tandem stance on foam 3x30" ea   Narrowed stance on foam EC 3x30"    EDUCATION  Education provided:   - post exercise soreness    Written Home Exercises Provided: Patient instructed to continue previously issued HEP. Pt was provided with a written copy of exercises to perform at home. Tomi demonstrated good  understanding of the education provided.     See EMR under Patient Instructions for exercises provided 11/21/18.    Assessment     Tomi tolerated treatment very well today, with intermittent reports of sharp pains unrelated to activity/exercise. Good pain reduction with SIJ compression from sheet in standing, simulating the support from a SIJ Belt. Encouraged pt to purchase and bring with her next visit for training in fitting, donning, doffing, and use.     Tomi is progressing well towards her goals. Pt will continue to benefit from skilled outpatient physical therapy to address the deficits listed in the problem list box on initial evaluation, provide pt/family education and to maximize pt's level of independence in the home and community environment.   Pt prognosis is Good.     Plan of care discussed with patient: Yes  Pt's spiritual, cultural and educational needs considered and patient is agreeable to the plan of care and goals as stated below:     Anticipated Barriers for therapy: far commute    GOALS  Short Term Goals:  4 weeks  1. Pt will report <4/10 pain to improve activity tolerance. Progressing, not met   2. Pt will increase MMT by 1/3 grade in order to improve functional stability and strength. Progressing, not met   3. Pt will tolerate HEP to improve impairments and independence with ADL's. MET 11/21/18     Long Term Goals: 8 weeks  1. Pt will report <2/10 pain to improve activity tolerance. Progressing, not met   2. Pt will report at least CJ on FOTO NOC-MUSCULO-SKELETAL DISORDER to demonstrate a decrease in disability and improvement in " independence with functional activities. Progressing, not met   3. Pt to score 15 on 30 Second Sit<>Stand Test for improved functional strength. Progressing, not met   4. Pt to perform squat with no compensations or UE support for improved body mechanics throughout pregnancy. Progressing, not met   5. Pt will be I with HEP for self-management of symptoms. Progressing, not met     Plan     Continue with established POC working toward PT goals.    Jordyn Blank, PT

## 2018-11-30 ENCOUNTER — CLINICAL SUPPORT (OUTPATIENT)
Dept: REHABILITATION | Facility: HOSPITAL | Age: 23
End: 2018-11-30
Attending: PSYCHIATRY & NEUROLOGY
Payer: COMMERCIAL

## 2018-11-30 DIAGNOSIS — R53.1 WEAKNESS: ICD-10-CM

## 2018-11-30 DIAGNOSIS — Z74.09 DECREASED FUNCTIONAL MOBILITY AND ENDURANCE: ICD-10-CM

## 2018-11-30 PROCEDURE — 97110 THERAPEUTIC EXERCISES: CPT | Mod: PO | Performed by: PHYSICAL THERAPIST

## 2018-11-30 PROCEDURE — 97112 NEUROMUSCULAR REEDUCATION: CPT | Mod: PO | Performed by: PHYSICAL THERAPIST

## 2018-11-30 NOTE — PROGRESS NOTES
"                            Physical Therapy Daily Treatment Note     Name: Tomi Ervin  Clinic Number: [id number]    Therapy Diagnosis:   Encounter Diagnoses   Name Primary?    Weakness     Decreased functional mobility and endurance      Physician: Shahnaz Miranda MD    Physician Orders: PT Eval and Treat   Medical Diagnosis: fibromyalgia  Evaluation Date: 10/26/2018 (11/30/18)  Authorization: (30) per calendar year  Plan of Care Certification Period: 12/21/18     Today's Date: 11/30/2018  Visit #: 4/8    Time In: 0900  Time Out: 1000  Total Billable Time: 60 minutes     Precautions: Standard, fibromyalgia  22 weeks pregnant, 4 y.o. daughter    Subjective     Pt reports: she got the SI belt and has been wearing it. She feels like it is helping when she's walking around campus.   Response to previous treatment: muscle soreness the day after treatment but this resolved  Functional change: too early to determine    Pain: 2-3/10  Location: lumbar/overall    Objective     CMS Impairment/Limitation/Restriction for FOTO NOC-MUSCULO-SKELETAL DISORDER Survey     Therapist reviewed FOTO scores for Tomi Ervin on 11/30/2018.   FOTO documents entered into [employer] - see Media section.     Limitation Score: 43%  Category: Mobility     Current : CK = at least 40% but < 60% impaired, limited or restricted  10/26/18: 43%  Goal: CJ = at least 20% but < 40% impaired, limited or restricted           TREATMENT  Tomi participated in therapeutic exercise to develop strength, endurance and core stabilization for 50 minutes, including:     Upright bike seat 6 L1 x 5 min    *EOB elevated with mat exercises  SLR 1# 2x10 bilat  Post pelvic tilt 10x10"  PPT + mini marching 3x30"  Hooklying add with 2 kg (yellow) ball 20x5"  Glute set 10x10"  HS roll ins orange SB  20x5"    Clams 2x10 bilat (red TBand)  Squats with UE support x10  Standing TA set with UEs on orange SB on table pressing down into ball 20x5"  UE ext, horiz " "rows, IR, ER red Tband x20 ea bilat  Seated on blue SB marching 3x1 min     Tomi participated in neuromuscular re-education activities to improve Balance, Coordination, Kinesthetic, Sense and Proprioception for 10 minutes, including:  Tandem stance on 1/2 foam roller 3x30" ea   Narrowed stance on horiz 1/2 foam roller EC 3x30"    EDUCATION  Education provided:   - Progress towards goals   - Role of therapy   - Activity modification  - Post exercise soreness    Written Home Exercises Provided: Patient instructed to continue previously issued HEP. Pt was not provided with a written copy of exercises to perform at home. Tomi demonstrated good  understanding of the education provided.     See EMR under Patient Instructions for exercises provided 11/21/18.    Assessment     Tomi tolerated treatment very well today with no report of sharp pain. SIJ Belt donned during all standing exercises. She benefits from close SBA for balance exercises due to high fear of falling. Pt progressing well in core and LE stabilization and strength.     intermittent reports of sharp pains unrelated to activity/exercise. Good pain reduction with SIJ compression from sheet in standing, simulating the support from a SIJ Belt. Encouraged pt to purchase and bring with her next visit for training in fitting, donning, doffing, and use.     Tomi is progressing well towards her goals. Pt will continue to benefit from skilled outpatient physical therapy to address the deficits listed in the problem list box on initial evaluation, provide pt/family education and to maximize pt's level of independence in the home and community environment.   Pt prognosis is Good.     Plan of care discussed with patient: Yes  Pt's spiritual, cultural and educational needs considered and patient is agreeable to the plan of care and goals as stated below:     Anticipated Barriers for therapy: far commute    GOALS  Short Term Goals:  4 weeks  1. Pt will report <4/10 " pain to improve activity tolerance. MET 11/30/18  2. Pt will increase MMT by 1/3 grade in order to improve functional stability and strength. Progressing, not met   3. Pt will tolerate HEP to improve impairments and independence with ADL's. MET 11/21/18     Long Term Goals: 8 weeks  1. Pt will report <2/10 pain to improve activity tolerance. Progressing, not met   2. Pt will report at least CJ on FOTO NOC-MUSCULO-SKELETAL DISORDER to demonstrate a decrease in disability and improvement in independence with functional activities. Progressing, not met   3. Pt to score 15 on 30 Second Sit<>Stand Test for improved functional strength. Progressing, not met   4. Pt to perform squat with no compensations or UE support for improved body mechanics throughout pregnancy. Progressing, not met   5. Pt will be I with HEP for self-management of symptoms. Progressing, not met     Plan     Continue with established POC working toward PT goals.    Jordyn Blank, PT

## 2019-01-03 ENCOUNTER — TELEPHONE (OUTPATIENT)
Dept: RHEUMATOLOGY | Facility: CLINIC | Age: 24
End: 2019-01-03

## 2019-01-03 ENCOUNTER — OFFICE VISIT (OUTPATIENT)
Dept: PHYSICAL MEDICINE AND REHAB | Facility: CLINIC | Age: 24
End: 2019-01-03
Payer: COMMERCIAL

## 2019-01-03 DIAGNOSIS — R20.0 NUMBNESS AND TINGLING OF BOTH FEET: ICD-10-CM

## 2019-01-03 DIAGNOSIS — R20.2 NUMBNESS AND TINGLING OF BOTH FEET: ICD-10-CM

## 2019-01-03 PROCEDURE — 95909 NRV CNDJ TST 5-6 STUDIES: CPT | Mod: S$GLB,,, | Performed by: PHYSICAL MEDICINE & REHABILITATION

## 2019-01-03 PROCEDURE — 95909 PR NERVE CONDUCTION STUDY; 5-6 STUDIES: ICD-10-PCS | Mod: S$GLB,,, | Performed by: PHYSICAL MEDICINE & REHABILITATION

## 2019-01-03 PROCEDURE — 95886 PR EMG COMPLETE, W/ NERVE CONDUCTION STUDIES, 5+ MUSCLES: ICD-10-PCS | Mod: S$GLB,,, | Performed by: PHYSICAL MEDICINE & REHABILITATION

## 2019-01-03 PROCEDURE — 99499 NO LOS: ICD-10-PCS | Mod: S$GLB,,, | Performed by: PHYSICAL MEDICINE & REHABILITATION

## 2019-01-03 PROCEDURE — 99999 PR PBB SHADOW E&M-EST. PATIENT-LVL II: CPT | Mod: PBBFAC,,, | Performed by: PHYSICAL MEDICINE & REHABILITATION

## 2019-01-03 PROCEDURE — 99999 PR PBB SHADOW E&M-EST. PATIENT-LVL II: ICD-10-PCS | Mod: PBBFAC,,, | Performed by: PHYSICAL MEDICINE & REHABILITATION

## 2019-01-03 PROCEDURE — 99499 UNLISTED E&M SERVICE: CPT | Mod: S$GLB,,, | Performed by: PHYSICAL MEDICINE & REHABILITATION

## 2019-01-03 PROCEDURE — 95886 MUSC TEST DONE W/N TEST COMP: CPT | Mod: S$GLB,,, | Performed by: PHYSICAL MEDICINE & REHABILITATION

## 2019-01-03 NOTE — PROGRESS NOTES
[facility]  [address] [facility] [address]  [place] [address]             Full Name: MARY BURROWS Gender: Female  Patient ID: [id number] YOB: 1995  History: Pt c/o intermitent paresthesias in both feet for the last few years. She reports low back pain that radiates into the right greater than left legs.      Visit Date: 1/3/2019 07:33  Age: 23 Years 7 Months Old  Examining Physician: ANDREW PERES MD  Referring Physician: TREVOR RAI MD      Sensory NCS      Nerve / Sites Rec. Site Onset Lat Peak Lat NP Amp PP Amp Segments Distance Velocity     ms ms µV µV  cm m/s   L Sural - Ankle (Calf)      Calf Ankle 3.33 4.17 18.2 4.7 Calf - Ankle 14 42      2 Ankle 3.18 4.01 28.9 21.8      R Sural - Ankle (Calf)      Calf Ankle 2.92 3.80 27.5 15.1 Calf - Ankle 14 48      2 Ankle 3.07 3.96 40.0 11.6          Motor NCS      Nerve / Sites Muscle Latency Amplitude Amp % Duration Segments Distance Lat Diff Velocity     ms mV % ms  cm ms m/s   L Peroneal - EDB      Ankle EDB 6.20 6.2 100 6.82 Ankle - EDB 8        Fib head EDB 11.88 5.2 84.1 7.14 Fib head - Ankle 25 5.68 44   R Peroneal - EDB      Ankle EDB 4.53 6.1 100 6.56 Ankle - EDB 8        Fib head EDB 10.47 5.1 83 6.72 Fib head - Ankle 26 5.94 44   L Tibial - AH      Ankle AH 4.43 13.9 100 6.77 Ankle - AH 8        Pop fossa AH 12.50 12.6 90.9 7.03 Pop fossa - Ankle 38 8.07 47       EMG         EMG Summary Table     Spontaneous MUAP Recruitment   Muscle IA Fib PSW Fasc H.F. Amp Dur. PPP Pattern   R. Vastus medialis N None None None None N N N N   R. Tibialis anterior N None None None None N N N N   R. Peroneus longus N None None None None N N N N   R. Gastrocnemius (Medial head) N None None None None N N N N   R. First dorsal interosseous N None None None None       R. Lumbar paraspinals N None None None None           Summary    The motor conduction test was normal in all 3 of the tested nerves: L Peroneal - EDB, R Peroneal - EDB, L Tibial - AH.    The sensory  conduction test was normal in all 2 of the tested nerves: L Sural - Ankle (Calf), R Sural - Ankle (Calf).    The needle EMG study was normal in all 6 tested muscles: R. Vastus medialis, R. Tibialis anterior, R. Peroneus longus, R. Gastrocnemius (Medial head), R. First dorsal interosseous, R. Lumbar paraspinals.      Electrodiagnostic Impression:  1. Normal electrophysiologic study.  2. There was insufficient electrodiagnostic evidence for diagnoses of peripheral polyneuropathy, focal mononeuropathy, myopathy, or lumbosacral radiculopathy/plexopathy of the right lower extremity.  Patient declined needle EMG exam of the left lower extremity.    Plan:  Today's test results will be sent to her referring physician, Dr. Miranda for further review and direction in her treatment.    Thank you very much for the referral. Please call if you have any questions regarding this study or the report.       -------------------------------  Anthony Castro M.D.

## 2019-01-03 NOTE — LETTER
January 3, 2019      Shahnaz Miranda MD  [address] [place] [address] [address] [address]           [facility]  [address] [facility] [address] [place] [address]  Phone: [phone]  Fax: [phone]          Patient: Tomi Ervin   MR Number: [id number]   YOB: 1995   Date of Visit: 1/3/2019       Dear Dr. Shahnaz Miranda:    Thank you for referring Tomi Ervin to me for evaluation. Attached you will find relevant portions of my assessment and plan of care.    If you have questions, please do not hesitate to call me. I look forward to following Tomi Erivn along with you.    Sincerely,    Anthony Castro MD    Enclosure  CC:  No Recipients    If you would like to receive this communication electronically, please contact [email or web address] or [phone] to request more information on [employer] Link access.    For providers and/or their staff who would like to refer a patient to [facility], please contact us through our one-stop-shop provider referral line, [facility], at [phone].    If you feel you have received this communication in error or would no longer like to receive these types of communications, please e-mail [email or web address]

## 2019-01-03 NOTE — TELEPHONE ENCOUNTER
----- Message from JuanC arlos Sethi sent at 1/3/2019  8:53 AM CST -----  Contact: Patient  Type: Reschedule Request    Caller would like to reschedule an appointment.    Who Called: Patient  Original Appointment Scheduled On: 1/10/19 @ 11  Preference for Appointment Day/Time: MW, late morning/early afternoon  Best Call Back Number: [phone]  Additional Information: NA    Patient is given number for [facility] rheumatology. Appointment is still 1-10-19 and attached to the referral. Will not cancel as this can be moved by . CG

## 2019-01-23 ENCOUNTER — OFFICE VISIT (OUTPATIENT)
Dept: NEUROLOGY | Facility: CLINIC | Age: 24
End: 2019-01-23
Payer: COMMERCIAL

## 2019-01-23 VITALS
SYSTOLIC BLOOD PRESSURE: 121 MMHG | HEIGHT: 63 IN | WEIGHT: 237 LBS | HEART RATE: 100 BPM | DIASTOLIC BLOOD PRESSURE: 84 MMHG | BODY MASS INDEX: 41.99 KG/M2 | RESPIRATION RATE: 17 BRPM

## 2019-01-23 DIAGNOSIS — R20.2 NUMBNESS AND TINGLING OF BOTH FEET: ICD-10-CM

## 2019-01-23 DIAGNOSIS — R20.0 NUMBNESS AND TINGLING OF BOTH FEET: ICD-10-CM

## 2019-01-23 DIAGNOSIS — Z3A.28 28 WEEKS GESTATION OF PREGNANCY: ICD-10-CM

## 2019-01-23 DIAGNOSIS — R51.9 INTRACTABLE HEADACHE, UNSPECIFIED CHRONICITY PATTERN, UNSPECIFIED HEADACHE TYPE: Primary | ICD-10-CM

## 2019-01-23 DIAGNOSIS — M79.7 FIBROMYALGIA: ICD-10-CM

## 2019-01-23 PROCEDURE — 99999 PR PBB SHADOW E&M-EST. PATIENT-LVL III: ICD-10-PCS | Mod: PBBFAC,,, | Performed by: PSYCHIATRY & NEUROLOGY

## 2019-01-23 PROCEDURE — 3008F PR BODY MASS INDEX (BMI) DOCUMENTED: ICD-10-PCS | Mod: CPTII,S$GLB,, | Performed by: PSYCHIATRY & NEUROLOGY

## 2019-01-23 PROCEDURE — 99214 PR OFFICE/OUTPT VISIT, EST, LEVL IV, 30-39 MIN: ICD-10-PCS | Mod: S$GLB,,, | Performed by: PSYCHIATRY & NEUROLOGY

## 2019-01-23 PROCEDURE — 99214 OFFICE O/P EST MOD 30 MIN: CPT | Mod: S$GLB,,, | Performed by: PSYCHIATRY & NEUROLOGY

## 2019-01-23 PROCEDURE — 3008F BODY MASS INDEX DOCD: CPT | Mod: CPTII,S$GLB,, | Performed by: PSYCHIATRY & NEUROLOGY

## 2019-01-23 PROCEDURE — 99999 PR PBB SHADOW E&M-EST. PATIENT-LVL III: CPT | Mod: PBBFAC,,, | Performed by: PSYCHIATRY & NEUROLOGY

## 2019-01-23 RX ORDER — BUSPIRONE HYDROCHLORIDE 10 MG/1
5 TABLET ORAL
COMMUNITY
Start: 2018-11-09 | End: 2019-06-20

## 2019-01-23 RX ORDER — VITAMIN A ACETATE, BETA CAROTENE, ASCORBIC ACID, CHOLECALCIFEROL, .ALPHA.-TOCOPHEROL ACETATE, DL-, THIAMINE MONONITRATE, RIBOFLAVIN, NIACINAMIDE, PYRIDOXINE HYDROCHLORIDE, FOLIC ACID, CYANOCOBALAMIN, CALCIUM CARBONATE, FERROUS FUMARATE, ZINC OXIDE, CUPRIC OXIDE 3080; 12; 120; 400; 1; 1.84; 3; 20; 22; 920; 25; 200; 27; 10; 2 [IU]/1; UG/1; MG/1; [IU]/1; MG/1; MG/1; MG/1; MG/1; MG/1; [IU]/1; MG/1; MG/1; MG/1; MG/1; MG/1
1 TABLET, FILM COATED ORAL DAILY
Refills: 11 | COMMUNITY
Start: 2018-11-07 | End: 2019-05-09

## 2019-01-23 RX ORDER — ACETAMINOPHEN AND CODEINE PHOSPHATE 300; 30 MG/1; MG/1
1 TABLET ORAL
COMMUNITY
Start: 2018-12-24 | End: 2019-05-09

## 2019-01-23 RX ORDER — ACETAMINOPHEN AND CODEINE PHOSPHATE 300; 30 MG/1; MG/1
TABLET ORAL
Refills: 0 | COMMUNITY
Start: 2018-12-24 | End: 2019-05-09

## 2019-01-23 RX ORDER — HYDROGEN PEROXIDE 3 %
20 SOLUTION, NON-ORAL MISCELLANEOUS
COMMUNITY
End: 2019-06-20 | Stop reason: SDUPTHER

## 2019-01-23 RX ORDER — CLINDAMYCIN HYDROCHLORIDE 150 MG/1
CAPSULE ORAL
Refills: 0 | COMMUNITY
Start: 2018-12-24 | End: 2019-05-09

## 2019-01-23 NOTE — PROGRESS NOTES
Subjective:       Patient ID: Tomi Ervin is a 23 y.o. female.    Chief Complaint: Headache  INTERVAL HISTORY 2019  The patient comes for follow up. In the interim, she has had a complete peripheral neuropathy work up which is negative and NCV which were also negative. She reports that distal paresthesias have improved. She is 29 w pregnant and reporting improvement of headaches. She is only having to take tylenol a few times per month.    HPI   The patient is  A pleasant 22 y/o female presenting for evaluation of headache. She is currently 20 weeks+ pregnant, . She has suffered with headaches for at least 10 years. Over the years she has tried Lyrica, Elavil Cymbalta, Lexapro, Zoloft all for Fibromyalgia. In addition to the headaches she complains of bilateral hand and feet numbness. She questions the diagnosis of Fibromyalgia and has a schedule appointment with Rheumatology coming up. This is intermittent. The feet are noted to be numb upon awakening. She has FHx of diabetes (mother).   Please see details of headache characteristics below.  Headache questionnaire    1. When did your Headaches start?    8 years old ()      2. Where are your headaches located?   All over, (neck, base, front/forehead)       3. Your headache's characteristics:   Excruciating, Pressure, Throbbing, Pounding      4. How long does the headache last?   Hours-days      5. How often does the headache occur?   Monthly       6. Are your headaches preceded or accompanied by other symptoms? yes   If yes, please describe.  Blurred vision, nausea, confusion       7. Does the headache awaken you at night? yes   If so, how often? Rare, occasional         8. Please jory the word that best describes your headache's intensity:    severe      9. Using a scale of 1 through 10, with 0 = no pain and 10 = the worst pain:   What score is your headache now? 3   What score is your headache at its worst? 10   What score is your headache at  its best? 1        10. Possible associated headache symptoms:  [x]  Sensitivity to light  [x] Dizziness  [] Nasal or sinus pressure/ pain   [x] Sensitivity to noise  [] Vertigo  [x] Problems with concentration  [] Sensitivity to smells  [x] Ringing in ears  [x] Problems with memory    [x] Blurred vision  [x] Irritability  [] Problems with task completion   [] Double vision  [x] Anger  []  Problems with relaxation  [] Loss of appetite  [x] Anxiety  [x] Neck tightness, Neck pain  [x] Nausea   [] Nasal congestion  [] Vomiting         11. Headache improving factors:  [x] Sleep  [x] Heat  [x] Darkness  [] Ice  [] Local pressure [] Menses (period)  [] Massage   [x] Medications:        12. Headache worsening factors:   [x] Fatigue [] Sneezing  [x] Changes in Weather  [x] Light [] Bending Over [x] Stress  [x] Noise [] Ovulation  [] Multiple Sclerosis Flare-Up  [] Smells  [] Menses  [] Food   [] Coughing [] Alcohol      13. Number of caffeinated drinks per day: 1      14. Number of diet drinks per day:  0         Past Medical History:   Diagnosis Date    Agoraphobia with panic attacks 1/16/2018    Anxiety 1/16/2018    Fibromyalgia     GERD (gastroesophageal reflux disease)     Migraines     Obesity (BMI 35.0-39.9 without comorbidity)     Pyelonephritis 2011    Urticaria 2008    lasted 3 months resolved     Past Surgical History:   Procedure Laterality Date    TONSILLECTOMY       Family History   Problem Relation Age of Onset    Diabetes Mother     Mental illness Mother         gambling problem    Uterine cancer Paternal Grandmother     Cancer Paternal Grandmother     Mental illness Sister         borderline personality disorder     Social History     Socioeconomic History    Marital status: Single     Spouse name: Not on file    Number of children: Not on file    Years of education: Not on file    Highest education level: Not on file   Social Needs    Financial resource strain: Not on file    Food  insecurity - worry: Not on file    Food insecurity - inability: Not on file    Transportation needs - medical: Not on file    Transportation needs - non-medical: Not on file   Occupational History    Not on file   Tobacco Use    Smoking status: Current Every Day Smoker     Packs/day: 0.50     Start date: 6/3/2012   Substance and Sexual Activity    Alcohol use: Yes     Alcohol/week: 0.6 oz     Types: 1 Glasses of wine per week    Drug use: No    Sexual activity: Yes     Partners: Male     Birth control/protection: Pill   Other Topics Concern    Not on file   Social History Narrative    Not on file     Review of patient's allergies indicates:  No Known Allergies    Current Outpatient Medications:     acetaminophen (TYLENOL) 500 MG tablet, Take 500 mg by mouth every 6 (six) hours as needed for Pain., Disp: , Rfl:     esomeprazole (NEXIUM) 20 MG capsule, Take 1 capsule (20 mg total) by mouth once daily., Disp: 30 capsule, Rfl: 11    prenatal 25/iron fum/folic/dha (PRENATAL-1 ORAL), Take by mouth., Disp: , Rfl:     loratadine (CLARITIN) 10 mg tablet, Take 10 mg by mouth once daily., Disp: , Rfl:       Objective:      Vitals:    01/23/19 1438   BP: 121/84   Pulse: 100   Resp: 17     Body mass index is 41.98 kg/m².    Physical Exam    Constitutional:   She appears well-developed and well-nourished. She is well groomed    HENT:    Head: Atraumatic, oral and nasal mucosa intact  Eyes: Conjunctivae and EOM are normal. Pupils are equal, round, and reactive to light OU  Neck: Neck supple. No thyromegaly present  Cardiovascular: Normal rate and normal heart sounds  No murmur heard  Pulmonary/Chest: Effort normal and breath sounds normal  Skin: Skin is warm and dry  Psychiatric: Normal mood and affect     Neuro exam:    Mental status:  Awake, attentive, Alert, oriented to self, place, year and month  Language function is intact    Cranial Nerves:  Smell was not formally evaluated  Cranial Nerves II - XII:  intact  Pursuits were smooth, normal saccades, no nystagmus OU  Funduscopic exam - disc were flat and pink, no exudates or hemorrhages OU  Motor - facial movement was symmetrical and normal     Palate moved well and was symmetrical with normal palatal and oral sensation  Tongue movements were full    Coordination:     Rapid alternating movements and rapid finger tapping - normal bilaterally  Finger to nose - normal and symmetric bilaterally   Heel to shin test - normal and symmetric bilaterally   Arm roll - smooth and symmetric   No intentional or positional tremor.     Motor:  Normal muscle bulk and symmetry. No fasciculations were noted    No pronator drift  Strength5/5 bilaterally     Reflexes:  Tendon reflexes were 2 + at biceps, triceps, brachioradialis, patellar, and Achilles bilaterally  On plantar stimulation toes were down going bilaterally  No clonus was noted     Sensory: Intact to light touch, pin prick in all extremities, except 25% decrease sensation to PP in the feet with a level at the ankles.    Gait: Romberg absent. Normal gait. Normal arm swing and turns. Good tandem    Review of Data:  Lab Results   Component Value Date     10/24/2018    K 3.5 10/24/2018     10/24/2018    CO2 21 (L) 10/24/2018    BUN 6 10/24/2018    CREATININE 0.7 10/24/2018     (H) 10/24/2018    HGBA1C 4.9 10/24/2018    AST 13 10/24/2018    ALT 14 10/24/2018    ALBUMIN 3.0 (L) 10/24/2018    PROT 6.9 10/24/2018    BILITOT 0.2 10/24/2018    CHOL 177 06/08/2018    HDL 53 06/08/2018    LDLCALC 111.0 06/08/2018    TRIG 65 06/08/2018       Lab Results   Component Value Date    WBC 9.17 09/11/2018    HGB 11.2 (L) 09/11/2018    HCT 34.5 (L) 09/11/2018    MCV 80 (L) 09/11/2018     09/11/2018       Lab Results   Component Value Date    TSH 3.028 10/24/2018           Assessment and Plan   Numbness and tingling of both feet. Work up for peripheral neuropathy negative  -   Fibromyalgia  -     Ambulatory consult to  Physical Therapy. Keep appointment with Rheumatology    Pregancy 29W   followed by OB/GYN    Migraine without aura, non intractable  Our options are limited during pregnancy. Treat only acutely, no prevention at this time. Limited prescription for Fioricet sent to the pharmacy    Counseling:  More than 50% of the 25 minute encounter was spent face to face counseling the patient regarding current status and future plan of care as well as side of the medications. All questions were answered to patient's satisfaction            Linnea Miranda M.D  Medical Director, Headache and Facial Pain  [facility]

## 2019-01-24 ENCOUNTER — DOCUMENTATION ONLY (OUTPATIENT)
Dept: REHABILITATION | Facility: HOSPITAL | Age: 24
End: 2019-01-24

## 2019-01-24 PROBLEM — R53.1 WEAKNESS: Status: RESOLVED | Noted: 2018-10-26 | Resolved: 2019-01-24

## 2019-01-24 PROBLEM — Z74.09 DECREASED FUNCTIONAL MOBILITY AND ENDURANCE: Status: RESOLVED | Noted: 2018-10-26 | Resolved: 2019-01-24

## 2019-01-24 NOTE — PROGRESS NOTES
[facility]  Physical Therapy Discharge Summary    Name: Tomi Ervin  Clinic Number: [id number]    Therapy Diagnosis:        Encounter Diagnoses   Name Primary?    Weakness      Decreased functional mobility and endurance        Physician: Shahnaz Miranda MD     Physician Orders: PT Eval and Treat   Medical Diagnosis: fibromyalgia      Date of Last visit: 11/30/18  Total Visits Attended: 4  Cancelled Visits: 1  No Show Visits: 0    Assessment      GOALS   Short Term Goals:  4 weeks  1. Pt will report <4/10 pain to improve activity tolerance. MET 11/30/18  2. Pt will increase MMT by 1/3 grade in order to improve functional stability and strength. Progressing, not met   3. Pt will tolerate HEP to improve impairments and independence with ADL's. MET 11/21/18     Long Term Goals: 8 weeks  1. Pt will report <2/10 pain to improve activity tolerance. Progressing, not met   2. Pt will report at least CJ on FOTO NOC-MUSCULO-SKELETAL DISORDER to demonstrate a decrease in disability and improvement in independence with functional activities. Progressing, not met   3. Pt to score 15 on 30 Second Sit<>Stand Test for improved functional strength. Progressing, not met   4. Pt to perform squat with no compensations or UE support for improved body mechanics throughout pregnancy. Progressing, not met   5. Pt will be I with HEP for self-management of symptoms. Progressing, not met     Discharge reason: Patient has not attended therapy since 11/30/18    Plan   This patient is discharged from Physical Therapy    Jordyn Blank, PT

## 2019-03-01 ENCOUNTER — OFFICE VISIT (OUTPATIENT)
Dept: RHEUMATOLOGY | Facility: CLINIC | Age: 24
End: 2019-03-01
Payer: COMMERCIAL

## 2019-03-01 VITALS
DIASTOLIC BLOOD PRESSURE: 85 MMHG | BODY MASS INDEX: 43.71 KG/M2 | HEIGHT: 63 IN | WEIGHT: 246.69 LBS | HEART RATE: 118 BPM | SYSTOLIC BLOOD PRESSURE: 125 MMHG

## 2019-03-01 DIAGNOSIS — M79.7 FIBROMYALGIA: Primary | ICD-10-CM

## 2019-03-01 PROCEDURE — 99999 PR PBB SHADOW E&M-EST. PATIENT-LVL III: ICD-10-PCS | Mod: PBBFAC,,, | Performed by: INTERNAL MEDICINE

## 2019-03-01 PROCEDURE — 3008F PR BODY MASS INDEX (BMI) DOCUMENTED: ICD-10-PCS | Mod: CPTII,S$GLB,, | Performed by: INTERNAL MEDICINE

## 2019-03-01 PROCEDURE — 99203 OFFICE O/P NEW LOW 30 MIN: CPT | Mod: S$GLB,,, | Performed by: INTERNAL MEDICINE

## 2019-03-01 PROCEDURE — 99203 PR OFFICE/OUTPT VISIT, NEW, LEVL III, 30-44 MIN: ICD-10-PCS | Mod: S$GLB,,, | Performed by: INTERNAL MEDICINE

## 2019-03-01 PROCEDURE — 99999 PR PBB SHADOW E&M-EST. PATIENT-LVL III: CPT | Mod: PBBFAC,,, | Performed by: INTERNAL MEDICINE

## 2019-03-01 PROCEDURE — 3008F BODY MASS INDEX DOCD: CPT | Mod: CPTII,S$GLB,, | Performed by: INTERNAL MEDICINE

## 2019-03-01 NOTE — PROGRESS NOTES
Chief Complaint   Patient presents with    Disease Management     fibromyalgia    Pain       Patient was referred by : self     History of presenting illness    23 year old girl comes in with pain,numbness and tingling all over her body  Random paresthesias with no prior triggers  All this started at age 14  She was diagnosed with fibromyalgia at age 14  She is currently pregnant 35 weeks  She has migraines  She has good and bad days  She gets muscle spasms in the legs,arms and neck,jaws,they are painful  Jaws lock    Joints hurt  Knees swell  Elbows,shoulders,neck,hands hurt    She had an extensive autoimmune panel in the past  Neg for lyme disease,lupus,RA    Has done PT recently    She has tried    savella : didn't help  lyrica : gave her mood symptoms  flexeril : helped but made her drowsy  fioricet : helps    Took lexapro and zoloft for mood symptoms  It didn't help with the pain    Labs I have     nml TSH,B1,No diabetes,b12,methylmalonic acid  Neg SSA,RF,GABRIELA,CCP  Neg RPR  SPEP/IF neg  HIV neg  Neg heavy metals  Ganglioside panel neg  nml CMP  Neg lyme  Hep c neg    July 2018 she had CRP 19.7    Random hives/urticaria + since age 14  This is not an issue anymore   Ingrown hairs+  Has had rashes on unclear etiology : one occasion : poison ivy related and many times no diagnosis made     No malar rash,photosensitivity  No telangiectasias  No calcinosis     No patchy alopecia  No oral and nasal ulcers  No sicca symptoms     No pleurisy or any cardiopulmonary complaints  No dysphagia,diplopia and dysphonia and muscle weakness    No n/v/d/c  No acid reflux+    No raynaud's+  No digital ulcers     No cytopenias  No renal issues    No blood clots     No fever,chills,night sweats,weight loss and loss of appetite     Past history : anxiety,fibromyalgia,obesity,agarophobia  1 miscarriage,was on birth control and didn't know she was pregnant     Family history :Diabetes,mental illness  Great aunt with fibromyalgia  Mom  has psoriasis     Social history: prior smoker,quit aug 2018      Review of Systems   Constitutional: Negative for activity change, appetite change, chills, diaphoresis, fatigue, fever and unexpected weight change.   HENT: Negative for congestion, dental problem, drooling, ear discharge, ear pain, facial swelling, hearing loss, mouth sores, nosebleeds, postnasal drip, rhinorrhea, sinus pressure, sinus pain, sneezing, sore throat, tinnitus, trouble swallowing and voice change.    Eyes: Negative for photophobia, pain, discharge, redness, itching and visual disturbance.   Respiratory: Negative for apnea, cough, choking, chest tightness, shortness of breath, wheezing and stridor.    Cardiovascular: Negative for chest pain, palpitations and leg swelling.   Gastrointestinal: Negative for abdominal distention, abdominal pain, anal bleeding, blood in stool, constipation, diarrhea, nausea, rectal pain and vomiting.   Endocrine: Negative for cold intolerance, heat intolerance, polydipsia, polyphagia and polyuria.   Genitourinary: Negative for decreased urine volume, difficulty urinating, dysuria, enuresis, flank pain, frequency, genital sores, hematuria and urgency.   Musculoskeletal: Positive for arthralgias. Negative for back pain, gait problem, joint swelling, myalgias, neck pain and neck stiffness.   Skin: Negative for color change, pallor, rash and wound.   Allergic/Immunologic: Negative for environmental allergies, food allergies and immunocompromised state.   Neurological: Negative for dizziness, tremors, seizures, syncope, facial asymmetry, speech difficulty, weakness, light-headedness, numbness and headaches.   Hematological: Negative for adenopathy. Does not bruise/bleed easily.   Psychiatric/Behavioral: Negative for agitation, behavioral problems, confusion, decreased concentration, dysphoric mood, hallucinations, self-injury, sleep disturbance and suicidal ideas. The patient is not nervous/anxious and is not  hyperactive.      Physical Exam     CRESPO-28 tender joint count: 0  CRESPO-28 swollen joint count: 0    Tender +  No synovitis    Physical Exam   Constitutional: She is oriented to person, place, and time and well-developed, well-nourished, and in no distress. No distress.   HENT:   Head: Normocephalic.   Mouth/Throat: Oropharynx is clear and moist.   Eyes: Conjunctivae are normal. Pupils are equal, round, and reactive to light. Right eye exhibits no discharge. Left eye exhibits no discharge. No scleral icterus.   Neck: Normal range of motion. No thyromegaly present.   Cardiovascular: Normal rate, regular rhythm, normal heart sounds and intact distal pulses.    Pulmonary/Chest: Effort normal and breath sounds normal. No stridor.   Abdominal: Soft. Bowel sounds are normal.   Lymphadenopathy:     She has no cervical adenopathy.   Neurological: She is alert and oriented to person, place, and time.   Skin: Skin is warm. No rash noted. She is not diaphoretic.     Psychiatric: Affect and judgment normal.   Musculoskeletal: Normal range of motion.         Assessment     23 year old girl comes in with pain,numbness and tingling all over her body  Random paresthesias with no prior triggers    All this started at age 14  She was diagnosed with fibromyalgia at age 14    She is currently pregnant 35 weeks    She has migraines    She has good and bad days    She gets muscle spasms in the legs,arms and neck,jaws,they are painful  Jaws lock    Joints hurt  Knees swell  Elbows,shoulders,neck,hands hurt    She had an extensive autoimmune panel in the past  Neg for lyme disease,lupus,RA  R/kole all labs on [facility]  Only at one point CRP was high but she was pregnant at that time and she also had sinusitis  No symptoms to suggest an autoimmune illness     Exam today no inflammatory synovitis  Diffuse hyperalgesia noted    This is less likely to be an inflammatory arthritis     1. Fibromyalgia          New problem     Plan    Suggested that  since she is pregnant I am not going to offer anything today  Since the concern that this is autoimmune is very low,I wouldn't do any more labs and other than tylenol I donot want to give her any medications anyway    Suggested yoga,duarte chi and water aerobics    In 6 months after she completes breast feeding,we can revisit the situation and we can treat for fibromyalgia and also monitor each visit if any inflammatory arthritic findings are noted.    Sleep hygiene suggested    Migraine headache management as per neurology     If in the next 6 months she develops rashes,she develops swollen joints I will see her wilmer Krishna was seen today for disease management and pain.    Diagnoses and all orders for this visit:    Fibromyalgia

## 2019-05-01 RX ORDER — HYDROGEN PEROXIDE 3 %
SOLUTION, NON-ORAL MISCELLANEOUS
Qty: 30 CAPSULE | Refills: 0 | Status: SHIPPED | OUTPATIENT
Start: 2019-05-01 | End: 2019-05-09 | Stop reason: SDUPTHER

## 2019-05-09 ENCOUNTER — OFFICE VISIT (OUTPATIENT)
Dept: FAMILY MEDICINE | Facility: CLINIC | Age: 24
End: 2019-05-09
Payer: COMMERCIAL

## 2019-05-09 VITALS
DIASTOLIC BLOOD PRESSURE: 79 MMHG | BODY MASS INDEX: 40.22 KG/M2 | SYSTOLIC BLOOD PRESSURE: 126 MMHG | TEMPERATURE: 98 F | HEIGHT: 63 IN | HEART RATE: 82 BPM | WEIGHT: 227 LBS

## 2019-05-09 DIAGNOSIS — F41.9 ANXIETY: Primary | ICD-10-CM

## 2019-05-09 DIAGNOSIS — F40.01 AGORAPHOBIA WITH PANIC ATTACKS: ICD-10-CM

## 2019-05-09 DIAGNOSIS — M79.7 FIBROMYALGIA: ICD-10-CM

## 2019-05-09 DIAGNOSIS — G43.909 MIGRAINE WITHOUT STATUS MIGRAINOSUS, NOT INTRACTABLE, UNSPECIFIED MIGRAINE TYPE: ICD-10-CM

## 2019-05-09 PROCEDURE — 3008F BODY MASS INDEX DOCD: CPT | Mod: CPTII,S$GLB,, | Performed by: FAMILY MEDICINE

## 2019-05-09 PROCEDURE — 99999 PR PBB SHADOW E&M-EST. PATIENT-LVL III: ICD-10-PCS | Mod: PBBFAC,,, | Performed by: FAMILY MEDICINE

## 2019-05-09 PROCEDURE — 99999 PR PBB SHADOW E&M-EST. PATIENT-LVL III: CPT | Mod: PBBFAC,,, | Performed by: FAMILY MEDICINE

## 2019-05-09 PROCEDURE — 99214 PR OFFICE/OUTPT VISIT, EST, LEVL IV, 30-39 MIN: ICD-10-PCS | Mod: S$GLB,,, | Performed by: FAMILY MEDICINE

## 2019-05-09 PROCEDURE — 99214 OFFICE O/P EST MOD 30 MIN: CPT | Mod: S$GLB,,, | Performed by: FAMILY MEDICINE

## 2019-05-09 PROCEDURE — 3008F PR BODY MASS INDEX (BMI) DOCUMENTED: ICD-10-PCS | Mod: CPTII,S$GLB,, | Performed by: FAMILY MEDICINE

## 2019-05-09 RX ORDER — RIZATRIPTAN BENZOATE 10 MG/1
10 TABLET, ORALLY DISINTEGRATING ORAL
Qty: 9 TABLET | Refills: 1 | Status: SHIPPED | OUTPATIENT
Start: 2019-05-09 | End: 2019-12-18

## 2019-05-09 RX ORDER — SERTRALINE HYDROCHLORIDE 25 MG/1
TABLET, FILM COATED ORAL
Qty: 21 TABLET | Refills: 0 | Status: SHIPPED | OUTPATIENT
Start: 2019-05-09 | End: 2019-06-20

## 2019-05-09 RX ORDER — SERTRALINE HYDROCHLORIDE 100 MG/1
100 TABLET, FILM COATED ORAL DAILY
Qty: 30 TABLET | Refills: 5 | Status: SHIPPED | OUTPATIENT
Start: 2019-05-09 | End: 2019-06-20 | Stop reason: SDUPTHER

## 2019-05-09 RX ORDER — ALPRAZOLAM 0.25 MG/1
0.25 TABLET ORAL 2 TIMES DAILY PRN
Refills: 0 | COMMUNITY
Start: 2019-05-09 | End: 2020-08-06 | Stop reason: SDUPTHER

## 2019-05-09 NOTE — PROGRESS NOTES
Patient presents follow-up anxiety panic attacks fibromyalgia and migraine.  Recent pregnancy delivered about a month ago.  She stopped breastfeeding recently.  She is currently on BuSpar which he took during the pregnancy.  She would like to get back on Zoloft which she was using successfully previously.  She did see the neurologist as well as rheumatologist during the pregnancy.  Slight depression symptoms.  No SI/HI.  Pregnancy would well with exception of some mild blood pressure elevation.  Past Medical History:  Past Medical History:   Diagnosis Date    Agoraphobia with panic attacks 1/16/2018    Anxiety 1/16/2018    Fibromyalgia     GERD (gastroesophageal reflux disease)     Migraines     Obesity (BMI 35.0-39.9 without comorbidity)     Pyelonephritis 2011    Urticaria 2008    lasted 3 months resolved     Past Surgical History:   Procedure Laterality Date    TONSILLECTOMY       Social History     Socioeconomic History    Marital status:      Spouse name: Not on file    Number of children: Not on file    Years of education: Not on file    Highest education level: Not on file   Occupational History    Not on file   Social Needs    Financial resource strain: Not on file    Food insecurity:     Worry: Not on file     Inability: Not on file    Transportation needs:     Medical: Not on file     Non-medical: Not on file   Tobacco Use    Smoking status: Current Every Day Smoker     Packs/day: 0.50     Start date: 6/3/2012   Substance and Sexual Activity    Alcohol use: Yes     Alcohol/week: 0.6 oz     Types: 1 Glasses of wine per week    Drug use: No    Sexual activity: Yes     Partners: Male     Birth control/protection: Pill   Lifestyle    Physical activity:     Days per week: Not on file     Minutes per session: Not on file    Stress: Not on file   Relationships    Social connections:     Talks on phone: Not on file     Gets together: Not on file     Attends Anabaptist service: Not on  file     Active member of club or organization: Not on file     Attends meetings of clubs or organizations: Not on file     Relationship status: Not on file   Other Topics Concern    Not on file   Social History Narrative    Not on file     Family History   Problem Relation Age of Onset    Diabetes Mother     Mental illness Mother         gambling problem    Uterine cancer Paternal Grandmother     Cancer Paternal Grandmother     Mental illness Sister         borderline personality disorder     Review of patient's allergies indicates:  No Known Allergies  Current Outpatient Medications on File Prior to Visit   Medication Sig Dispense Refill    busPIRone (BUSPAR) 10 MG tablet Take 5 mg by mouth.      esomeprazole (NEXIUM) 20 MG capsule Take 20 mg by mouth.      ALPRAZolam (XANAX) 0.25 MG tablet Take 1 tablet (0.25 mg total) by mouth 2 (two) times daily as needed for Anxiety.  0    [DISCONTINUED] acetaminophen (TYLENOL) 500 MG tablet Take 500 mg by mouth every 6 (six) hours as needed for Pain.      [DISCONTINUED] acetaminophen-codeine 300-30mg (TYLENOL #3) 300-30 mg Tab Take 1 tablet by mouth.      [DISCONTINUED] acetaminophen-codeine 300-30mg (TYLENOL #3) 300-30 mg Tab TK 1 T PO  Q 4 H PRN P  0    [DISCONTINUED] clindamycin (CLEOCIN) 150 MG capsule   0    [DISCONTINUED] DOCOSAHEXANOIC ACID ORAL Take 1 capsule by mouth.      [DISCONTINUED] esomeprazole (NEXIUM) 20 MG capsule TAKE 1 CAPSULE BY MOUTH EVERY DAY 30 capsule 0    [DISCONTINUED] prenatal 25/iron fum/folic/dha (PRENATAL-1 ORAL) Take by mouth.      [DISCONTINUED] PRENATAL PLUS, CALCIUM CARB, 27 mg iron- 1 mg Tab Take 1 tablet by mouth once daily.  11    [DISCONTINUED] prenatal vit 10-iron fum-folic (VITAFOL-OB) 65-1 mg Tab Take 1 tablet by mouth.       No current facility-administered medications on file prior to visit.            ROS:  GENERAL: No fever, chills,  or significant weight changes.   CARDIOVASCULAR: Denies chest pain, PND,  "orthopnea or reduced exercise tolerance.  ABDOMEN: Appetite fine. Denies diarrhea, abdominal pain, hematemesis or blood in stool.  URINARY: No flank pain, dysuria or hematuria.    Vitals:    05/09/19 1034   BP: 126/79   Pulse: 82   Temp: 98.2 °F (36.8 °C)   Weight: 103 kg (227 lb)   Height: 5' 3" (1.6 m)       Wt Readings from Last 3 Encounters:   05/09/19 103 kg (227 lb)   03/01/19 111.9 kg (246 lb 11.1 oz)   01/23/19 107.5 kg (236 lb 15.9 oz)       APPEARANCE: Well nourished, well developed, in no acute distress.    HEAD: Normocephalic.  Atraumatic.  EYES:   Right eye: Pupil reactive.  Conjunctiva clear.    Left eye: Pupil reactive.  Conjunctiva clear.    NECK: Supple. No bruits.  No JVD.  No cervical lymphadenopathy.  No thyromegaly.    CHEST: Breath sounds clear bilaterally.  Normal respiratory effort  CARDIOVASCULAR: Normal rate.  Regular rhythm.  No murmurs.  No rub.  No gallops.   No edema.  MENTAL STATUS: Alert.  Oriented x 3.        Tomi was seen today for postpartum care.    Diagnoses and all orders for this visit:    Anxiety    Agoraphobia with panic attacks    Fibromyalgia    Migraine without status migrainosus, not intractable, unspecified migraine type    Other orders  -     sertraline (ZOLOFT) 25 MG tablet; Take 1 daily for 1 week, then take 2 daily for 1 week, then start 100 mg pills  -     sertraline (ZOLOFT) 100 MG tablet; Take 1 tablet (100 mg total) by mouth once daily.  -     rizatriptan (MAXALT-MLT) 10 MG disintegrating tablet; Take 1 tablet (10 mg total) by mouth as needed for Migraine. May repeat in 2 hours if needed. No more than 2 pills in 24 hours.     Follow-up 6 weeks and as needed prior.  She may discontinue the BuSpar.    "

## 2019-05-23 ENCOUNTER — HOSPITAL ENCOUNTER (OUTPATIENT)
Dept: RADIOLOGY | Facility: HOSPITAL | Age: 24
Discharge: HOME | End: 2019-05-23
Attending: PSYCHIATRY & NEUROLOGY
Payer: COMMERCIAL

## 2019-05-23 DIAGNOSIS — R51.9 INTRACTABLE HEADACHE, UNSPECIFIED CHRONICITY PATTERN, UNSPECIFIED HEADACHE TYPE: ICD-10-CM

## 2019-05-23 PROCEDURE — 70551 MRI BRAIN WITHOUT CONTRAST: ICD-10-PCS | Mod: 26,,, | Performed by: RADIOLOGY

## 2019-05-23 PROCEDURE — 70551 MRI BRAIN STEM W/O DYE: CPT | Mod: TC,PO

## 2019-05-23 PROCEDURE — 70551 MRI BRAIN STEM W/O DYE: CPT | Mod: 26,,, | Performed by: RADIOLOGY

## 2019-06-06 ENCOUNTER — PATIENT OUTREACH (OUTPATIENT)
Dept: ADMINISTRATIVE | Facility: HOSPITAL | Age: 24
End: 2019-06-06

## 2019-06-20 ENCOUNTER — OFFICE VISIT (OUTPATIENT)
Dept: FAMILY MEDICINE | Facility: CLINIC | Age: 24
End: 2019-06-20
Payer: COMMERCIAL

## 2019-06-20 VITALS
WEIGHT: 223.56 LBS | BODY MASS INDEX: 39.61 KG/M2 | HEIGHT: 63 IN | SYSTOLIC BLOOD PRESSURE: 118 MMHG | DIASTOLIC BLOOD PRESSURE: 74 MMHG | HEART RATE: 68 BPM | TEMPERATURE: 98 F

## 2019-06-20 DIAGNOSIS — G43.909 MIGRAINE WITHOUT STATUS MIGRAINOSUS, NOT INTRACTABLE, UNSPECIFIED MIGRAINE TYPE: ICD-10-CM

## 2019-06-20 DIAGNOSIS — F41.9 ANXIETY: Primary | ICD-10-CM

## 2019-06-20 DIAGNOSIS — F40.01 AGORAPHOBIA WITH PANIC ATTACKS: ICD-10-CM

## 2019-06-20 PROBLEM — N30.00 ACUTE CYSTITIS WITHOUT HEMATURIA: Status: RESOLVED | Noted: 2018-09-12 | Resolved: 2019-06-20

## 2019-06-20 PROCEDURE — 3008F BODY MASS INDEX DOCD: CPT | Mod: CPTII,S$GLB,, | Performed by: FAMILY MEDICINE

## 2019-06-20 PROCEDURE — 3008F PR BODY MASS INDEX (BMI) DOCUMENTED: ICD-10-PCS | Mod: CPTII,S$GLB,, | Performed by: FAMILY MEDICINE

## 2019-06-20 PROCEDURE — 99999 PR PBB SHADOW E&M-EST. PATIENT-LVL III: CPT | Mod: PBBFAC,,, | Performed by: FAMILY MEDICINE

## 2019-06-20 PROCEDURE — 99213 PR OFFICE/OUTPT VISIT, EST, LEVL III, 20-29 MIN: ICD-10-PCS | Mod: S$GLB,,, | Performed by: FAMILY MEDICINE

## 2019-06-20 PROCEDURE — 99999 PR PBB SHADOW E&M-EST. PATIENT-LVL III: ICD-10-PCS | Mod: PBBFAC,,, | Performed by: FAMILY MEDICINE

## 2019-06-20 PROCEDURE — 99213 OFFICE O/P EST LOW 20 MIN: CPT | Mod: S$GLB,,, | Performed by: FAMILY MEDICINE

## 2019-06-20 RX ORDER — SERTRALINE HYDROCHLORIDE 100 MG/1
100 TABLET, FILM COATED ORAL DAILY
Qty: 90 TABLET | Refills: 1 | Status: SHIPPED | OUTPATIENT
Start: 2019-06-20 | End: 2019-12-18

## 2019-06-20 RX ORDER — NAPROXEN 500 MG/1
500 TABLET ORAL 2 TIMES DAILY PRN
Qty: 60 TABLET | Refills: 1 | Status: SHIPPED | OUTPATIENT
Start: 2019-06-20 | End: 2019-08-24 | Stop reason: SDUPTHER

## 2019-06-20 RX ORDER — HYDROGEN PEROXIDE 3 %
20 SOLUTION, NON-ORAL MISCELLANEOUS DAILY
Qty: 90 CAPSULE | Refills: 3 | Status: SHIPPED | OUTPATIENT
Start: 2019-06-20 | End: 2020-06-09

## 2019-06-20 NOTE — PROGRESS NOTES
follow-up anxiety panic attacks. Much better with Zoloft. REcent MRI through neurology for migraine ok. Maxalt partial response. Denies depression symptoms.  No SI/HI.   Past Medical History:  Past Medical History:   Diagnosis Date    Agoraphobia with panic attacks 1/16/2018    Anxiety 1/16/2018    Fibromyalgia     GERD (gastroesophageal reflux disease)     Migraines     Obesity (BMI 35.0-39.9 without comorbidity)     Pyelonephritis 2011    Urticaria 2008    lasted 3 months resolved     Past Surgical History:   Procedure Laterality Date    TONSILLECTOMY       Social History     Socioeconomic History    Marital status:      Spouse name: Not on file    Number of children: Not on file    Years of education: Not on file    Highest education level: Not on file   Occupational History    Not on file   Social Needs    Financial resource strain: Not on file    Food insecurity:     Worry: Not on file     Inability: Not on file    Transportation needs:     Medical: Not on file     Non-medical: Not on file   Tobacco Use    Smoking status: Current Every Day Smoker     Packs/day: 0.50     Start date: 6/3/2012   Substance and Sexual Activity    Alcohol use: Yes     Alcohol/week: 0.6 oz     Types: 1 Glasses of wine per week    Drug use: No    Sexual activity: Yes     Partners: Male     Birth control/protection: Pill   Lifestyle    Physical activity:     Days per week: Not on file     Minutes per session: Not on file    Stress: Not on file   Relationships    Social connections:     Talks on phone: Not on file     Gets together: Not on file     Attends Denominational service: Not on file     Active member of club or organization: Not on file     Attends meetings of clubs or organizations: Not on file     Relationship status: Not on file   Other Topics Concern    Not on file   Social History Narrative    Not on file     Family History   Problem Relation Age of Onset    Diabetes Mother     Mental illness  "Mother         gambling problem    Uterine cancer Paternal Grandmother     Cancer Paternal Grandmother     Mental illness Sister         borderline personality disorder     Review of patient's allergies indicates:  No Known Allergies  Current Outpatient Medications on File Prior to Visit   Medication Sig Dispense Refill    ALPRAZolam (XANAX) 0.25 MG tablet Take 1 tablet (0.25 mg total) by mouth 2 (two) times daily as needed for Anxiety.  0    rizatriptan (MAXALT-MLT) 10 MG disintegrating tablet Take 1 tablet (10 mg total) by mouth as needed for Migraine. May repeat in 2 hours if needed. No more than 2 pills in 24 hours. 9 tablet 1    [DISCONTINUED] esomeprazole (NEXIUM) 20 MG capsule Take 20 mg by mouth.      [DISCONTINUED] sertraline (ZOLOFT) 100 MG tablet Take 1 tablet (100 mg total) by mouth once daily. 30 tablet 5    [DISCONTINUED] busPIRone (BUSPAR) 10 MG tablet Take 5 mg by mouth.      [DISCONTINUED] sertraline (ZOLOFT) 25 MG tablet Take 1 daily for 1 week, then take 2 daily for 1 week, then start 100 mg pills 21 tablet 0     No current facility-administered medications on file prior to visit.            ROS:  GENERAL: No fever, chills,  or significant weight changes.   CARDIOVASCULAR: Denies chest pain, PND, orthopnea or reduced exercise tolerance.  ABDOMEN: Appetite fine. Denies diarrhea, abdominal pain, hematemesis or blood in stool.  URINARY: No flank pain, dysuria or hematuria.    Vitals:    06/20/19 1044   BP: 118/74   Pulse: 68   Temp: 98.4 °F (36.9 °C)   TempSrc: Oral   Weight: 101.4 kg (223 lb 8.7 oz)   Height: 5' 3" (1.6 m)       Wt Readings from Last 3 Encounters:   06/20/19 101.4 kg (223 lb 8.7 oz)   05/09/19 103 kg (227 lb)   03/01/19 111.9 kg (246 lb 11.1 oz)       APPEARANCE: Well nourished, well developed, in no acute distress.    HEAD: Normocephalic.  Atraumatic.  EYES:   Right eye: Pupil reactive.  Conjunctiva clear.    Left eye: Pupil reactive.  Conjunctiva clear.    NECK: Supple. " No bruits.  No JVD.  No cervical lymphadenopathy.  No thyromegaly.    CHEST: Breath sounds clear bilaterally.  Normal respiratory effort  CARDIOVASCULAR: Normal rate.  Regular rhythm.  No murmurs.  No rub.  No gallops.   No edema.  MENTAL STATUS: Alert.  Oriented x 3.        Tomi was seen today for follow-up.    Diagnoses and all orders for this visit:    Anxiety    Agoraphobia with panic attacks    Migraine without status migrainosus, not intractable, unspecified migraine type    Other orders  -     naproxen (NAPROSYN) 500 MG tablet; Take 1 tablet (500 mg total) by mouth 2 (two) times daily as needed (migraine).  -     esomeprazole (NEXIUM) 20 MG capsule; Take 1 capsule (20 mg total) by mouth once daily.  -     sertraline (ZOLOFT) 100 MG tablet; Take 1 tablet (100 mg total) by mouth once daily.     continue current meds. F/u prn and 6 months. To sched f/u with neurology. Add naproxen to Maxalt

## 2019-06-26 ENCOUNTER — TELEPHONE (OUTPATIENT)
Dept: FAMILY MEDICINE | Facility: CLINIC | Age: 24
End: 2019-06-26

## 2019-06-26 NOTE — TELEPHONE ENCOUNTER
Asking if Dr Cabrera does arm implant birth control, informed she should check with her ob/gyn for that, verbalized understanding.

## 2019-06-26 NOTE — TELEPHONE ENCOUNTER
----- Message from Leeanne Dyer sent at 6/26/2019  9:56 AM CDT -----  Contact: Pt  Pt would like nurse to contact her regarding a question about birth control. Please contact pt at ([phone])

## 2019-08-14 ENCOUNTER — TELEPHONE (OUTPATIENT)
Dept: FAMILY MEDICINE | Facility: CLINIC | Age: 24
End: 2019-08-14

## 2019-08-14 NOTE — TELEPHONE ENCOUNTER
----- Message from Marielle Calloway sent at 8/14/2019 10:07 AM CDT -----  Contact: self  states that her family has staph infection & she's starting to break out, can script be called in..[phone] (home)     [id number] - [place] - [address]  [place] [address]  Phone: [phone] Fax: [phone]

## 2019-08-15 ENCOUNTER — OFFICE VISIT (OUTPATIENT)
Dept: FAMILY MEDICINE | Facility: CLINIC | Age: 24
End: 2019-08-15
Payer: COMMERCIAL

## 2019-08-15 VITALS
WEIGHT: 216 LBS | HEART RATE: 80 BPM | SYSTOLIC BLOOD PRESSURE: 110 MMHG | HEIGHT: 63 IN | DIASTOLIC BLOOD PRESSURE: 74 MMHG | BODY MASS INDEX: 38.27 KG/M2 | TEMPERATURE: 98 F

## 2019-08-15 DIAGNOSIS — Z30.9 ENCOUNTER FOR CONTRACEPTIVE MANAGEMENT, UNSPECIFIED TYPE: ICD-10-CM

## 2019-08-15 DIAGNOSIS — B95.8 STAPH INFECTION: Primary | ICD-10-CM

## 2019-08-15 LAB — B-HCG UR QL: NEGATIVE

## 2019-08-15 PROCEDURE — 99999 PR PBB SHADOW E&M-EST. PATIENT-LVL IV: ICD-10-PCS | Mod: PBBFAC,,, | Performed by: NURSE PRACTITIONER

## 2019-08-15 PROCEDURE — 99213 OFFICE O/P EST LOW 20 MIN: CPT | Mod: S$GLB,,, | Performed by: NURSE PRACTITIONER

## 2019-08-15 PROCEDURE — 99213 PR OFFICE/OUTPT VISIT, EST, LEVL III, 20-29 MIN: ICD-10-PCS | Mod: S$GLB,,, | Performed by: NURSE PRACTITIONER

## 2019-08-15 PROCEDURE — 99999 PR PBB SHADOW E&M-EST. PATIENT-LVL IV: CPT | Mod: PBBFAC,,, | Performed by: NURSE PRACTITIONER

## 2019-08-15 PROCEDURE — 3008F PR BODY MASS INDEX (BMI) DOCUMENTED: ICD-10-PCS | Mod: CPTII,S$GLB,, | Performed by: NURSE PRACTITIONER

## 2019-08-15 PROCEDURE — 81025 URINE PREGNANCY TEST: CPT | Mod: PO

## 2019-08-15 PROCEDURE — 3008F BODY MASS INDEX DOCD: CPT | Mod: CPTII,S$GLB,, | Performed by: NURSE PRACTITIONER

## 2019-08-15 RX ORDER — NORGESTIMATE AND ETHINYL ESTRADIOL 7DAYSX3 LO
1 KIT ORAL DAILY
Qty: 30 TABLET | Refills: 11 | Status: SHIPPED | OUTPATIENT
Start: 2019-08-15 | End: 2020-08-31

## 2019-08-15 RX ORDER — DOXYCYCLINE HYCLATE 100 MG
100 TABLET ORAL 2 TIMES DAILY
Qty: 20 TABLET | Refills: 0 | Status: SHIPPED | OUTPATIENT
Start: 2019-08-15 | End: 2019-08-25

## 2019-08-15 RX ORDER — CHLORHEXIDINE GLUCONATE 40 MG/ML
SOLUTION TOPICAL DAILY
Qty: 473 ML | Refills: 0 | Status: SHIPPED | OUTPATIENT
Start: 2019-08-15 | End: 2019-08-25

## 2019-08-15 NOTE — PROGRESS NOTES
Subjective:       Patient ID: Tomi Ervin is a 24 y.o. female.    Chief Complaint: Recurrent Skin Infections    Rash   This is a new ( and both children have staph infections) problem. The current episode started in the past 7 days. The problem is unchanged. Location: Small abscess to left lower back. The rash is characterized by blistering and redness. She was exposed to nothing. Pertinent negatives include no anorexia, congestion, cough, diarrhea, eye pain, facial edema, fatigue, fever, joint pain, nail changes, rhinorrhea, shortness of breath, sore throat or vomiting. Treatments tried: Currently using bactroban. The treatment provided no relief. There is no history of allergies, asthma, eczema or varicella.   Pt also requests birth control; states would like to see gyn for birth control implant, however states would like oral birth control until can get implant. Pt has used tri-lo-sprintec; request to restart this medication.   Past Medical History:   Diagnosis Date    Agoraphobia with panic attacks 1/16/2018    Anxiety 1/16/2018    Fibromyalgia     GERD (gastroesophageal reflux disease)     Migraines     Obesity (BMI 35.0-39.9 without comorbidity)     Pyelonephritis 2011    Urticaria 2008    lasted 3 months resolved     Social History     Socioeconomic History    Marital status:      Spouse name: Not on file    Number of children: Not on file    Years of education: Not on file    Highest education level: Not on file   Occupational History    Not on file   Social Needs    Financial resource strain: Not on file    Food insecurity:     Worry: Not on file     Inability: Not on file    Transportation needs:     Medical: Not on file     Non-medical: Not on file   Tobacco Use    Smoking status: Current Every Day Smoker     Packs/day: 0.50     Start date: 6/3/2012   Substance and Sexual Activity    Alcohol use: Yes     Alcohol/week: 0.6 oz     Types: 1 Glasses of wine per week     Drug use: No    Sexual activity: Yes     Partners: Male     Birth control/protection: Pill   Lifestyle    Physical activity:     Days per week: Not on file     Minutes per session: Not on file    Stress: Not on file   Relationships    Social connections:     Talks on phone: Not on file     Gets together: Not on file     Attends Anabaptism service: Not on file     Active member of club or organization: Not on file     Attends meetings of clubs or organizations: Not on file     Relationship status: Not on file   Other Topics Concern    Not on file   Social History Narrative    Not on file     Past Surgical History:   Procedure Laterality Date    TONSILLECTOMY         Review of Systems   Constitutional: Negative.  Negative for fatigue and fever.   HENT: Negative.  Negative for congestion, rhinorrhea and sore throat.    Eyes: Negative.  Negative for pain.   Respiratory: Negative.  Negative for cough and shortness of breath.    Cardiovascular: Negative.    Gastrointestinal: Negative.  Negative for anorexia, diarrhea and vomiting.   Endocrine: Negative.    Genitourinary: Negative.    Musculoskeletal: Negative.  Negative for joint pain.   Skin: Positive for rash. Negative for nail changes.   Allergic/Immunologic: Negative.    Neurological: Negative.    Psychiatric/Behavioral: Negative.        Objective:      Physical Exam   Constitutional: She is oriented to person, place, and time. She appears well-developed and well-nourished.   HENT:   Head: Normocephalic.   Right Ear: External ear normal.   Left Ear: External ear normal.   Nose: Nose normal.   Mouth/Throat: Oropharynx is clear and moist.   Eyes: Pupils are equal, round, and reactive to light. Conjunctivae are normal.   Neck: Normal range of motion. Neck supple.   Cardiovascular: Normal rate, regular rhythm and normal heart sounds.   Pulmonary/Chest: Effort normal and breath sounds normal.   Abdominal: Soft. Bowel sounds are normal.   Musculoskeletal: Normal  range of motion.   Neurological: She is alert and oriented to person, place, and time.   Skin: Skin is warm and dry. Capillary refill takes 2 to 3 seconds.        Psychiatric: She has a normal mood and affect. Her behavior is normal. Judgment and thought content normal.   Nursing note and vitals reviewed.      Assessment:       1. Staph infection    2. Encounter for contraceptive management, unspecified type        Plan:           Tomi was seen today for recurrent skin infections.    Diagnoses and all orders for this visit:    Staph infection  -     doxycycline (VIBRA-TABS) 100 MG tablet; Take 1 tablet (100 mg total) by mouth 2 (two) times daily. for 10 days  -     chlorhexidine (HIBICLENS) 4 % external liquid; Apply topically once daily. Wash with daily. Do not get in eyes. for 10 days  Bactroban 3x/d x 10 d  Warm compresses PRN  Report to ER immediately if symptoms worsen    Encounter for contraceptive management, unspecified type  -     Pregnancy, urine rapid  -     Ambulatory referral to Obstetrics / Gynecology  -     norgestimate-ethinyl estradiol (ORTHO TRI-CYCLEN LO) 0.18/0.215/0.25 mg-25 mcg tablet; Take 1 tablet by mouth once daily.

## 2019-08-26 RX ORDER — NAPROXEN 500 MG/1
500 TABLET ORAL 2 TIMES DAILY PRN
Qty: 60 TABLET | Refills: 1 | Status: SHIPPED | OUTPATIENT
Start: 2019-08-26 | End: 2019-10-23 | Stop reason: SDUPTHER

## 2019-10-28 RX ORDER — NAPROXEN 500 MG/1
500 TABLET ORAL 2 TIMES DAILY PRN
Qty: 60 TABLET | Refills: 5 | Status: SHIPPED | OUTPATIENT
Start: 2019-10-28 | End: 2021-02-04 | Stop reason: SDUPTHER

## 2019-11-04 ENCOUNTER — OFFICE VISIT (OUTPATIENT)
Dept: FAMILY MEDICINE | Facility: CLINIC | Age: 24
End: 2019-11-04
Payer: MEDICAID

## 2019-11-04 VITALS
DIASTOLIC BLOOD PRESSURE: 71 MMHG | WEIGHT: 211 LBS | TEMPERATURE: 99 F | HEART RATE: 103 BPM | HEIGHT: 63 IN | BODY MASS INDEX: 37.39 KG/M2 | SYSTOLIC BLOOD PRESSURE: 110 MMHG

## 2019-11-04 DIAGNOSIS — L03.116 CELLULITIS OF LEFT LOWER EXTREMITY: Primary | ICD-10-CM

## 2019-11-04 PROCEDURE — 99999 PR PBB SHADOW E&M-EST. PATIENT-LVL III: ICD-10-PCS | Mod: PBBFAC,,, | Performed by: FAMILY MEDICINE

## 2019-11-04 PROCEDURE — 99213 OFFICE O/P EST LOW 20 MIN: CPT | Mod: PBBFAC,PO | Performed by: FAMILY MEDICINE

## 2019-11-04 PROCEDURE — 99213 OFFICE O/P EST LOW 20 MIN: CPT | Mod: S$PBB,,, | Performed by: FAMILY MEDICINE

## 2019-11-04 PROCEDURE — 99213 PR OFFICE/OUTPT VISIT, EST, LEVL III, 20-29 MIN: ICD-10-PCS | Mod: S$PBB,,, | Performed by: FAMILY MEDICINE

## 2019-11-04 PROCEDURE — 99999 PR PBB SHADOW E&M-EST. PATIENT-LVL III: CPT | Mod: PBBFAC,,, | Performed by: FAMILY MEDICINE

## 2019-11-04 RX ORDER — CLINDAMYCIN HYDROCHLORIDE 300 MG/1
300 CAPSULE ORAL 3 TIMES DAILY
COMMUNITY
End: 2019-11-11

## 2019-11-04 RX ORDER — PROMETHAZINE HYDROCHLORIDE AND PHENYLEPHRINE HYDROCHLORIDE 6.25; 5 MG/5ML; MG/5ML
SYRUP ORAL
COMMUNITY
End: 2019-11-14

## 2019-11-04 RX ORDER — ACETAMINOPHEN 325 MG/1
325 TABLET ORAL EVERY 6 HOURS PRN
COMMUNITY
End: 2020-12-01 | Stop reason: ALTCHOICE

## 2019-11-04 RX ORDER — MUPIROCIN 20 MG/G
OINTMENT TOPICAL 3 TIMES DAILY
COMMUNITY
End: 2019-12-18

## 2019-11-04 NOTE — PROGRESS NOTES
The patient noticed a small area left posterior thigh which suddenly began to burn or itch.  Got bigger over the course of the day with erythema and significant tenderness. This started 2 days ago.  She went to urgent care yesterday and she was started on clindamycin.  She felt bad yesterday, but did not check her temperature.  She does feel better today.  She has not had any fever today.  The area has not drained.      Diagnoses and all orders for this visit:    Cellulitis of left lower extremity     Unclear whether this is related to an insect bite or developing abscess.  No obvious drainable abscess at the present time.  We did discuss possible incision and drainage. Patient feels may be improving today.  She will continue the clindamycin for now.  May use warm compresses.  Follow up if worsens or not improving with above.        Past Medical History:  Past Medical History:   Diagnosis Date    Agoraphobia with panic attacks 1/16/2018    Anxiety 1/16/2018    Fibromyalgia     GERD (gastroesophageal reflux disease)     Migraines     Obesity (BMI 35.0-39.9 without comorbidity)     Pyelonephritis 2011    Urticaria 2008    lasted 3 months resolved     Past Surgical History:   Procedure Laterality Date    TONSILLECTOMY       Review of patient's allergies indicates:  No Known Allergies  Current Outpatient Medications on File Prior to Visit   Medication Sig Dispense Refill    acetaminophen (TYLENOL) 325 MG tablet Take 325 mg by mouth every 6 (six) hours as needed for Pain.      ALPRAZolam (XANAX) 0.25 MG tablet Take 1 tablet (0.25 mg total) by mouth 2 (two) times daily as needed for Anxiety.  0    clindamycin (CLEOCIN) 300 MG capsule Take 300 mg by mouth 3 (three) times daily.      esomeprazole (NEXIUM) 20 MG capsule Take 1 capsule (20 mg total) by mouth once daily. 90 capsule 3    mupirocin (BACTROBAN) 2 % ointment Apply topically 3 (three) times daily.      naproxen (NAPROSYN) 500 MG tablet TAKE 1 TABLET  (500 MG TOTAL) BY MOUTH 2 (TWO) TIMES DAILY AS NEEDED (MIGRAINE). 60 tablet 5    norgestimate-ethinyl estradiol (ORTHO TRI-CYCLEN LO) 0.18/0.215/0.25 mg-25 mcg tablet Take 1 tablet by mouth once daily. 30 tablet 11    promethazine-phenylephrine (PROMETHAZINE VC) 6.25-5 mg/5 mL Syrp Take by mouth as needed.      sertraline (ZOLOFT) 100 MG tablet Take 1 tablet (100 mg total) by mouth once daily. 90 tablet 1    rizatriptan (MAXALT-MLT) 10 MG disintegrating tablet Take 1 tablet (10 mg total) by mouth as needed for Migraine. May repeat in 2 hours if needed. No more than 2 pills in 24 hours. (Patient not taking: Reported on 11/4/2019) 9 tablet 1     No current facility-administered medications on file prior to visit.      Social History     Socioeconomic History    Marital status:      Spouse name: Not on file    Number of children: Not on file    Years of education: Not on file    Highest education level: Not on file   Occupational History    Not on file   Social Needs    Financial resource strain: Not on file    Food insecurity:     Worry: Not on file     Inability: Not on file    Transportation needs:     Medical: Not on file     Non-medical: Not on file   Tobacco Use    Smoking status: Current Every Day Smoker     Packs/day: 0.50     Start date: 6/3/2012   Substance and Sexual Activity    Alcohol use: Yes     Alcohol/week: 1.0 standard drinks     Types: 1 Glasses of wine per week    Drug use: No    Sexual activity: Yes     Partners: Male     Birth control/protection: Pill   Lifestyle    Physical activity:     Days per week: Not on file     Minutes per session: Not on file    Stress: Not on file   Relationships    Social connections:     Talks on phone: Not on file     Gets together: Not on file     Attends Church service: Not on file     Active member of club or organization: Not on file     Attends meetings of clubs or organizations: Not on file     Relationship status: Not on file  "  Other Topics Concern    Not on file   Social History Narrative    Not on file     Family History   Problem Relation Age of Onset    Diabetes Mother     Mental illness Mother         gambling problem    Uterine cancer Paternal Grandmother     Cancer Paternal Grandmother     Mental illness Sister         borderline personality disorder           ROS:  GENERAL: No fever, chills,  or significant weight changes.   CARDIOVASCULAR: Denies chest pain, PND, orthopnea or reduced exercise tolerance.  ABDOMEN: Appetite fine. Denies diarrhea, abdominal pain, hematemesis or blood in stool.  URINARY: No flank pain, dysuria or hematuria.    Vitals:    11/04/19 1340   BP: 110/71   Pulse: 103   Temp: 98.7 °F (37.1 °C)   Weight: 95.7 kg (211 lb)   Height: 5' 3" (1.6 m)       Wt Readings from Last 3 Encounters:   11/04/19 95.7 kg (211 lb)   08/15/19 98 kg (216 lb)   06/20/19 101.4 kg (223 lb 8.7 oz)       APPEARANCE: Well nourished, well developed, in no acute distress.    HEAD: Normocephalic.  Atraumatic.  EYES:   Right eye: Pupil reactive.  Conjunctiva clear.    Left eye: Pupil reactive.  Conjunctiva clear.    MENTAL STATUS: Alert.  Oriented x 3.  She has an approximately 10 cm area of erythema and induration left posterior thigh.  In the center of this is a small pinpoint possible bite jory with slightly raised area around it about 2 cm.  Not obviously fluctuant.  "

## 2019-11-11 ENCOUNTER — HOSPITAL ENCOUNTER (EMERGENCY)
Facility: HOSPITAL | Age: 24
Discharge: HOME | End: 2019-11-12
Attending: EMERGENCY MEDICINE
Payer: MEDICAID

## 2019-11-11 VITALS
RESPIRATION RATE: 16 BRPM | TEMPERATURE: 98 F | BODY MASS INDEX: 37.39 KG/M2 | DIASTOLIC BLOOD PRESSURE: 94 MMHG | HEIGHT: 63 IN | SYSTOLIC BLOOD PRESSURE: 160 MMHG | WEIGHT: 211 LBS | HEART RATE: 105 BPM | OXYGEN SATURATION: 98 %

## 2019-11-11 DIAGNOSIS — T78.40XA ALLERGIC REACTION, INITIAL ENCOUNTER: ICD-10-CM

## 2019-11-11 DIAGNOSIS — R21 RASH: Primary | ICD-10-CM

## 2019-11-11 PROCEDURE — 99284 PR EMERGENCY DEPT VISIT,LEVEL IV: ICD-10-PCS | Mod: ,,, | Performed by: PHYSICIAN ASSISTANT

## 2019-11-11 PROCEDURE — 99284 EMERGENCY DEPT VISIT MOD MDM: CPT | Mod: ,,, | Performed by: PHYSICIAN ASSISTANT

## 2019-11-11 PROCEDURE — 82962 GLUCOSE BLOOD TEST: CPT

## 2019-11-11 PROCEDURE — 96372 THER/PROPH/DIAG INJ SC/IM: CPT

## 2019-11-11 PROCEDURE — 99284 EMERGENCY DEPT VISIT MOD MDM: CPT | Mod: 25

## 2019-11-12 LAB
B-HCG UR QL: NEGATIVE
CTP QC/QA: YES
POCT GLUCOSE: 93 MG/DL (ref 70–110)

## 2019-11-12 PROCEDURE — 25000003 PHARM REV CODE 250: Performed by: PHYSICIAN ASSISTANT

## 2019-11-12 PROCEDURE — 81025 URINE PREGNANCY TEST: CPT | Performed by: PHYSICIAN ASSISTANT

## 2019-11-12 PROCEDURE — 63600175 PHARM REV CODE 636 W HCPCS: Performed by: PHYSICIAN ASSISTANT

## 2019-11-12 RX ORDER — DOXYCYCLINE 100 MG/1
100 CAPSULE ORAL 2 TIMES DAILY
Qty: 10 CAPSULE | Refills: 0 | Status: SHIPPED | OUTPATIENT
Start: 2019-11-12 | End: 2019-11-14 | Stop reason: SDUPTHER

## 2019-11-12 RX ORDER — HYDROXYZINE HYDROCHLORIDE 10 MG/1
10 TABLET, FILM COATED ORAL
Status: COMPLETED | OUTPATIENT
Start: 2019-11-12 | End: 2019-11-12

## 2019-11-12 RX ORDER — TRIAMCINOLONE ACETONIDE 40 MG/ML
40 INJECTION, SUSPENSION INTRA-ARTICULAR; INTRAMUSCULAR
Status: COMPLETED | OUTPATIENT
Start: 2019-11-12 | End: 2019-11-12

## 2019-11-12 RX ADMIN — TRIAMCINOLONE ACETONIDE 40 MG: 40 INJECTION, SUSPENSION INTRA-ARTICULAR; INTRAMUSCULAR at 12:11

## 2019-11-12 RX ADMIN — HYDROXYZINE HYDROCHLORIDE 10 MG: 10 TABLET, FILM COATED ORAL at 12:11

## 2019-11-12 NOTE — ED PROVIDER NOTES
Encounter Date: 11/11/2019       History     Chief Complaint   Patient presents with    Rash     Pt states she had abscess to left posterior thigh drained yesterday at [facility]. At 0200 this morning, patient started having generalized hives to body with large surrounding area of wound erythema. She went back to [facility] this afternoon and was given benadryl and predisone. Pt states symptoms are now worsened. Denies SOB or throat discomfort. She reports taking first dose of Bactrim this afternoon.      24 year old female with medical history of anxiety, GERD, migraines presenting to the ED with the chief complaint of rash. Patient reports having an abscess to her left posterior thigh for the past 10 days. She had an I&D performed at [facility] yesterday and was given a prescription to Bactrim. She reports taking 1 dose of the Bactrim this morning. Today she noticed a pruritic, erythematous rash develop around the I&D site and over her torso and back. She was re-evalauted at [facility] ED and received Benadryl PO and RX for Prednisone for an allergic reaction. She was advised to discontinue taking the Bactrim. She was unhappy with her care at [facility] which prompted her ED visit today. She denies fever, chest pain, SOB, cough, trouble swallowing, nausea, vomiting, urinary or bowel movement changes. She reports having hives 1 time when she was a child. She denies allergic reactions otherwise.         Review of patient's allergies indicates:  No Known Allergies  Past Medical History:   Diagnosis Date    Agoraphobia with panic attacks 1/16/2018    Anxiety 1/16/2018    Fibromyalgia     GERD (gastroesophageal reflux disease)     Migraines     Obesity (BMI 35.0-39.9 without comorbidity)     Pyelonephritis 2011    Urticaria 2008    lasted 3 months resolved     Past Surgical History:   Procedure Laterality Date    TONSILLECTOMY       Family History   Problem Relation Age of Onset    Diabetes Mother      Mental illness Mother         gambling problem    Uterine cancer Paternal Grandmother     Cancer Paternal Grandmother     Mental illness Sister         borderline personality disorder     Social History     Tobacco Use    Smoking status: Current Every Day Smoker     Packs/day: 0.50     Start date: 6/3/2012   Substance Use Topics    Alcohol use: Yes     Alcohol/week: 1.0 standard drinks     Types: 1 Glasses of wine per week    Drug use: No     Review of Systems   Constitutional: Negative for chills, diaphoresis and fever.   HENT: Negative for sore throat.    Eyes: Negative for pain, redness and visual disturbance.   Respiratory: Negative for shortness of breath.    Cardiovascular: Negative for chest pain.   Gastrointestinal: Negative for nausea.   Genitourinary: Negative for dysuria.   Musculoskeletal: Negative for back pain.   Skin: Positive for rash and wound.   Neurological: Negative for weakness, light-headedness, numbness and headaches.   Hematological: Does not bruise/bleed easily.     Physical Exam     Initial Vitals [11/11/19 2324]   BP Pulse Resp Temp SpO2   (!) 160/94 105 16 98.3 °F (36.8 °C) 98 %      MAP       --         Physical Exam    Constitutional: She appears well-developed and well-nourished. She is not diaphoretic. No distress.   HENT:   Head: Normocephalic and atraumatic.   Mouth/Throat: Oropharynx is clear and moist. No oropharyngeal exudate.   Posterior oropharynx clear and pink   Eyes: EOM are normal. Pupils are equal, round, and reactive to light.   Neck: Normal range of motion. Neck supple.   Cardiovascular: Normal rate and regular rhythm.   Pulmonary/Chest: Breath sounds normal. No respiratory distress. She has no wheezes.   Speaking full sentences without difficulty   Abdominal: Soft. There is no tenderness.   Musculoskeletal: Normal range of motion. She exhibits no tenderness.   2x2cm ulceration to posterior left thigh. Macular erythematous rash to torso, back, and left posterior  thigh.   Neurological: She is alert and oriented to person, place, and time. No cranial nerve deficit or sensory deficit.   Skin: Skin is warm and dry. Rash noted. No erythema.         Upper back:    Left posterior leg:        ED Course   Procedures  Labs Reviewed - No data to display       Imaging Results    None          Medical Decision Making:   History:   Old Medical Records: I decided to obtain old medical records.  Old Records Summarized: records from clinic visits.  Clinical Tests:   Lab Tests: Ordered and Reviewed       APC / Resident Notes:   24 year old female with medical history of anxiety, GERD, migraines presenting to the ED c/o rash. Seen at [facility] ED yesterday and had I&D of abscess to posterior left thigh yesterday and given RX for Bactrim. Reports noticing a rash develop around the abscess site today that spread to her torso and back. Took 1st dose of the Bactrim today after the rash began. Seen 2nd time at [facility] ED today for rash and given RX for Prednisone and Epi pen. Patient was not happy with her care at [facility] which prompted her visit here today. DDx includes but not limited to allergic dermatitis, medication side effect, cellulitis. I have considered but do not suspect SJS/TEN.     Etiology most consistent with allergic reaction. Vitals stable without signs of respiratory distress. Tolerating secretions well and PO fluids without difficulty. Advised patient to discontinue taking Bactrim. Will start RX for Doxycycline for cellulitis coverage around abscess site. Advised patient to continue Prednisone and Benadryl as previously directed. Patient stable for outpatient management. She has a follow-up with her PCP on Thursday this week. Patient expresses understanding and agreeable to the plan. Return to ED precautions given for new, worsening, or concerning symptoms. I have discussed the care of this patient with my supervising physician.                             Clinical  Impression:       ICD-10-CM ICD-9-CM   1. Rash R21 782.1   2. Allergic reaction, initial encounter T78.40XA 995.3         Disposition:   Disposition: Discharged  Condition: Stable                     Cem Velasquez PA-C  11/12/19 0411

## 2019-11-12 NOTE — ED TRIAGE NOTES
"Pt states she had abscess to left posterior thigh drained yesterday at [facility]. At 0200 this morning, patient started having generalized hives to body with large surrounding area of wound erythema. No new medications taken last night. She went back to [facility] this afternoon and was given benadryl and predisone. Pt states symptoms are now worsened. Denies SOB or throat discomfort. She reports taking first dose of Bactrim this afternoon. Redness and rash noted to L posterior thigh around abscess. Patient's wound is uncovered and open. Patient reports small amount serosanguinous drainage from wound. Patient took packing out at 2:00pm today. Hives also noted to chest and back. Has prescription for epi pen. Patient reports chills and clamminess but no fever. Patient states "I'm concerned that when they drained the abscess a toxin was released into my body. "  "

## 2019-11-12 NOTE — DISCHARGE INSTRUCTIONS
Discontinue taking the Bactrim and begin the prescribed Doxycycline to treat an underlying infection  Continue the Benadryl and Prednisone as previously directed  Follow-up with your PCP appointment on Thursday    Return to the emergency room for new, worsening, or concerning symptoms

## 2019-11-14 ENCOUNTER — OFFICE VISIT (OUTPATIENT)
Dept: FAMILY MEDICINE | Facility: CLINIC | Age: 24
End: 2019-11-14
Payer: MEDICAID

## 2019-11-14 VITALS
SYSTOLIC BLOOD PRESSURE: 126 MMHG | HEART RATE: 72 BPM | HEIGHT: 63 IN | DIASTOLIC BLOOD PRESSURE: 84 MMHG | WEIGHT: 211 LBS | TEMPERATURE: 98 F | BODY MASS INDEX: 37.39 KG/M2

## 2019-11-14 DIAGNOSIS — L03.116 CELLULITIS OF LEFT LOWER EXTREMITY: Primary | ICD-10-CM

## 2019-11-14 DIAGNOSIS — A49.02 MRSA (METHICILLIN RESISTANT STAPHYLOCOCCUS AUREUS) INFECTION: ICD-10-CM

## 2019-11-14 PROCEDURE — 99213 OFFICE O/P EST LOW 20 MIN: CPT | Mod: S$PBB,,, | Performed by: FAMILY MEDICINE

## 2019-11-14 PROCEDURE — 99999 PR PBB SHADOW E&M-EST. PATIENT-LVL III: CPT | Mod: PBBFAC,,, | Performed by: FAMILY MEDICINE

## 2019-11-14 PROCEDURE — 99999 PR PBB SHADOW E&M-EST. PATIENT-LVL III: ICD-10-PCS | Mod: PBBFAC,,, | Performed by: FAMILY MEDICINE

## 2019-11-14 PROCEDURE — 99213 OFFICE O/P EST LOW 20 MIN: CPT | Mod: PBBFAC,PO | Performed by: FAMILY MEDICINE

## 2019-11-14 PROCEDURE — 99213 PR OFFICE/OUTPT VISIT, EST, LEVL III, 20-29 MIN: ICD-10-PCS | Mod: S$PBB,,, | Performed by: FAMILY MEDICINE

## 2019-11-14 RX ORDER — ACETAMINOPHEN AND CODEINE PHOSPHATE 300; 30 MG/1; MG/1
1 TABLET ORAL
COMMUNITY
Start: 2019-11-10 | End: 2019-12-18

## 2019-11-14 RX ORDER — SULFAMETHOXAZOLE AND TRIMETHOPRIM 800; 160 MG/1; MG/1
160 TABLET ORAL
COMMUNITY
Start: 2019-11-10 | End: 2019-11-14

## 2019-11-14 RX ORDER — PREDNISONE 20 MG/1
TABLET ORAL
COMMUNITY
Start: 2019-11-11 | End: 2019-12-18

## 2019-11-14 RX ORDER — DOXYCYCLINE 100 MG/1
100 CAPSULE ORAL 2 TIMES DAILY
Qty: 20 CAPSULE | Refills: 0 | Status: SHIPPED | OUTPATIENT
Start: 2019-11-14 | End: 2019-11-24

## 2019-11-14 RX ORDER — DIPHENHYDRAMINE HCL 25 MG
25 CAPSULE ORAL EVERY 4 HOURS PRN
COMMUNITY
Start: 2019-11-11 | End: 2019-12-18

## 2019-11-14 NOTE — PROGRESS NOTES
Patient presents for follow-up.  Seen recently regarding developing cellulitis at the left posterior thigh.  She was on clindamycin.  Symptoms worsened and she went to [place] emergency room and had incision and drainage performed and was changed to Bactrim.  She states she started developing some rash around the area of the incision and drainage prior to taking the Bactrim.  She took the Bactrim and developed diffuse rash over the trunk and extremities later that day..  Bactrim was discontinued and she was prescribed prednisone.  She denied going to [facility] ER and was given doxycycline.  The area is improving.  The rash is improving.  She denies prior problems with Bactrim.  No fever chills.  Culture did show MRSA.      Tomi was seen today for follow-up.    Diagnoses and all orders for this visit:    Cellulitis of left lower extremity    MRSA (methicillin resistant Staphylococcus aureus) infection    Other orders  -     doxycycline (VIBRAMYCIN) 100 MG Cap; Take 1 capsule (100 mg total) by mouth 2 (two) times daily. for 10 days     Resolving MRSA abscess.  Week put in a refill for doxycycline as I think it will take her longer than 5 days to clear it.  Follow-up as needed if symptoms worsen or not continuing to resolve.  I would avoid Bactrim in the future as I suspect this is what caused her diffuse rash.          Past Medical History:  Past Medical History:   Diagnosis Date    Agoraphobia with panic attacks 1/16/2018    Anxiety 1/16/2018    Fibromyalgia     GERD (gastroesophageal reflux disease)     Migraines     Obesity (BMI 35.0-39.9 without comorbidity)     Pyelonephritis 2011    Urticaria 2008    lasted 3 months resolved     Past Surgical History:   Procedure Laterality Date    TONSILLECTOMY       Review of patient's allergies indicates:   Allergen Reactions    Bactrim [sulfamethoxazole-trimethoprim] Rash     Current Outpatient Medications on File Prior to Visit   Medication Sig Dispense Refill     acetaminophen (TYLENOL) 325 MG tablet Take 325 mg by mouth every 6 (six) hours as needed for Pain.      acetaminophen-codeine 300-30mg (TYLENOL #3) 300-30 mg Tab Take 1 tablet by mouth.      ALPRAZolam (XANAX) 0.25 MG tablet Take 1 tablet (0.25 mg total) by mouth 2 (two) times daily as needed for Anxiety.  0    diphenhydrAMINE (BENADRYL) 25 mg capsule Take 25 mg by mouth every 4 (four) hours as needed.       esomeprazole (NEXIUM) 20 MG capsule Take 1 capsule (20 mg total) by mouth once daily. 90 capsule 3    mupirocin (BACTROBAN) 2 % ointment Apply topically 3 (three) times daily.      naproxen (NAPROSYN) 500 MG tablet TAKE 1 TABLET (500 MG TOTAL) BY MOUTH 2 (TWO) TIMES DAILY AS NEEDED (MIGRAINE). 60 tablet 5    norgestimate-ethinyl estradiol (ORTHO TRI-CYCLEN LO) 0.18/0.215/0.25 mg-25 mcg tablet Take 1 tablet by mouth once daily. 30 tablet 11    predniSONE (DELTASONE) 20 MG tablet       rizatriptan (MAXALT-MLT) 10 MG disintegrating tablet Take 1 tablet (10 mg total) by mouth as needed for Migraine. May repeat in 2 hours if needed. No more than 2 pills in 24 hours. 9 tablet 1    sertraline (ZOLOFT) 100 MG tablet Take 1 tablet (100 mg total) by mouth once daily. 90 tablet 1    [DISCONTINUED] doxycycline (VIBRAMYCIN) 100 MG Cap Take 1 capsule (100 mg total) by mouth 2 (two) times daily. for 5 days 10 capsule 0    [DISCONTINUED] promethazine-phenylephrine (PROMETHAZINE VC) 6.25-5 mg/5 mL Syrp Take by mouth as needed.      [DISCONTINUED] sulfamethoxazole-trimethoprim 800-160mg (BACTRIM DS) 800-160 mg Tab Take 160 mg by mouth.       No current facility-administered medications on file prior to visit.      Social History     Socioeconomic History    Marital status:      Spouse name: Not on file    Number of children: Not on file    Years of education: Not on file    Highest education level: Not on file   Occupational History    Not on file   Social Needs    Financial resource strain: Not on  "file    Food insecurity:     Worry: Not on file     Inability: Not on file    Transportation needs:     Medical: Not on file     Non-medical: Not on file   Tobacco Use    Smoking status: Current Every Day Smoker     Packs/day: 0.50     Start date: 6/3/2012   Substance and Sexual Activity    Alcohol use: Yes     Alcohol/week: 1.0 standard drinks     Types: 1 Glasses of wine per week    Drug use: No    Sexual activity: Yes     Partners: Male     Birth control/protection: Pill   Lifestyle    Physical activity:     Days per week: Not on file     Minutes per session: Not on file    Stress: Not on file   Relationships    Social connections:     Talks on phone: Not on file     Gets together: Not on file     Attends Moravian service: Not on file     Active member of club or organization: Not on file     Attends meetings of clubs or organizations: Not on file     Relationship status: Not on file   Other Topics Concern    Not on file   Social History Narrative    Not on file     Family History   Problem Relation Age of Onset    Diabetes Mother     Mental illness Mother         gambling problem    Uterine cancer Paternal Grandmother     Cancer Paternal Grandmother     Mental illness Sister         borderline personality disorder           ROS:  GENERAL: No fever, chills,  or significant weight changes.   CARDIOVASCULAR: Denies chest pain, PND, orthopnea or reduced exercise tolerance.  ABDOMEN: Appetite fine. Denies diarrhea, abdominal pain, hematemesis or blood in stool.  URINARY: No flank pain, dysuria or hematuria.    Vitals:    11/14/19 1103   BP: 126/84   Pulse: 72   Temp: 98.1 °F (36.7 °C)   Weight: 95.7 kg (211 lb)   Height: 5' 3" (1.6 m)       Wt Readings from Last 3 Encounters:   11/14/19 95.7 kg (211 lb)   11/11/19 95.7 kg (211 lb)   11/04/19 95.7 kg (211 lb)       APPEARANCE: Well nourished, well developed, in no acute distress.    HEAD: Normocephalic.  Atraumatic.  EYES:   Right eye: Pupil reactive. "  Conjunctiva clear.    Left eye: Pupil reactive.  Conjunctiva clear.    MENTAL STATUS: Alert.  Oriented x 3.  She has an approximately 10 cm area of erythema with central nodule left posterior thigh.  No current drainage.  She is status post incision and drainage. Mildly tender.  She has a faint diffuse erythematous macular rash over the trunk and extremities.

## 2019-12-18 ENCOUNTER — OFFICE VISIT (OUTPATIENT)
Dept: FAMILY MEDICINE | Facility: CLINIC | Age: 24
End: 2019-12-18
Payer: MEDICAID

## 2019-12-18 VITALS
BODY MASS INDEX: 38.16 KG/M2 | HEIGHT: 63 IN | SYSTOLIC BLOOD PRESSURE: 134 MMHG | TEMPERATURE: 98 F | DIASTOLIC BLOOD PRESSURE: 92 MMHG | HEART RATE: 101 BPM | WEIGHT: 215.38 LBS

## 2019-12-18 DIAGNOSIS — F32.A DEPRESSION, UNSPECIFIED DEPRESSION TYPE: Primary | ICD-10-CM

## 2019-12-18 DIAGNOSIS — F41.9 ANXIETY: ICD-10-CM

## 2019-12-18 DIAGNOSIS — F40.01 AGORAPHOBIA WITH PANIC ATTACKS: ICD-10-CM

## 2019-12-18 PROCEDURE — 99213 PR OFFICE/OUTPT VISIT, EST, LEVL III, 20-29 MIN: ICD-10-PCS | Mod: S$PBB,,, | Performed by: FAMILY MEDICINE

## 2019-12-18 PROCEDURE — 99213 OFFICE O/P EST LOW 20 MIN: CPT | Mod: S$PBB,,, | Performed by: FAMILY MEDICINE

## 2019-12-18 PROCEDURE — 99999 PR PBB SHADOW E&M-EST. PATIENT-LVL III: ICD-10-PCS | Mod: PBBFAC,,, | Performed by: FAMILY MEDICINE

## 2019-12-18 PROCEDURE — 99999 PR PBB SHADOW E&M-EST. PATIENT-LVL III: CPT | Mod: PBBFAC,,, | Performed by: FAMILY MEDICINE

## 2019-12-18 PROCEDURE — 99213 OFFICE O/P EST LOW 20 MIN: CPT | Mod: PBBFAC,PO | Performed by: FAMILY MEDICINE

## 2019-12-18 RX ORDER — SERTRALINE HYDROCHLORIDE 100 MG/1
150 TABLET, FILM COATED ORAL DAILY
Qty: 135 TABLET | Refills: 1 | Status: SHIPPED | OUTPATIENT
Start: 2019-12-18 | End: 2020-01-21

## 2019-12-18 NOTE — PROGRESS NOTES
Patient presents follow-up anxiety panic attacks.  Symptoms seem to be worse recently.  She has had some depression..  Compliant with Zoloft which she was using successfully previously. .  No SI/HI.   some mild blood pressure elevation, usually okay..  Possibly interested in counseling.        Tomi was seen today for depression.    Diagnoses and all orders for this visit:    Depression, unspecified depression type  -     Ambulatory referral to Psychology    Anxiety  -     Ambulatory referral to Psychology    Agoraphobia with panic attacks  -     Ambulatory referral to Psychology    Other orders  -     sertraline (ZOLOFT) 100 MG tablet; Take 1.5 tablets (150 mg total) by mouth once daily.      Increase Zoloft.  Follow-up with me 4 weeks if she is not seeing psychology by then.  Follow-up as needed prior to this.            Past Medical History:  Past Medical History:   Diagnosis Date    Agoraphobia with panic attacks 1/16/2018    Anxiety 1/16/2018    Fibromyalgia     GERD (gastroesophageal reflux disease)     Migraines     Obesity (BMI 35.0-39.9 without comorbidity)     Pyelonephritis 2011    Urticaria 2008    lasted 3 months resolved     Past Surgical History:   Procedure Laterality Date    TONSILLECTOMY       Review of patient's allergies indicates:   Allergen Reactions    Bactrim [sulfamethoxazole-trimethoprim] Rash     Current Outpatient Medications on File Prior to Visit   Medication Sig Dispense Refill    acetaminophen (TYLENOL) 325 MG tablet Take 325 mg by mouth every 6 (six) hours as needed for Pain.      ALPRAZolam (XANAX) 0.25 MG tablet Take 1 tablet (0.25 mg total) by mouth 2 (two) times daily as needed for Anxiety.  0    esomeprazole (NEXIUM) 20 MG capsule Take 1 capsule (20 mg total) by mouth once daily. 90 capsule 3    naproxen (NAPROSYN) 500 MG tablet TAKE 1 TABLET (500 MG TOTAL) BY MOUTH 2 (TWO) TIMES DAILY AS NEEDED (MIGRAINE). 60 tablet 5    norgestimate-ethinyl estradiol (ORTHO  TRI-CYCLEN LO) 0.18/0.215/0.25 mg-25 mcg tablet Take 1 tablet by mouth once daily. 30 tablet 11    [DISCONTINUED] sertraline (ZOLOFT) 100 MG tablet Take 1 tablet (100 mg total) by mouth once daily. 90 tablet 1    [DISCONTINUED] acetaminophen-codeine 300-30mg (TYLENOL #3) 300-30 mg Tab Take 1 tablet by mouth.      [DISCONTINUED] diphenhydrAMINE (BENADRYL) 25 mg capsule Take 25 mg by mouth every 4 (four) hours as needed.       [DISCONTINUED] mupirocin (BACTROBAN) 2 % ointment Apply topically 3 (three) times daily.      [DISCONTINUED] predniSONE (DELTASONE) 20 MG tablet       [DISCONTINUED] rizatriptan (MAXALT-MLT) 10 MG disintegrating tablet Take 1 tablet (10 mg total) by mouth as needed for Migraine. May repeat in 2 hours if needed. No more than 2 pills in 24 hours. 9 tablet 1     No current facility-administered medications on file prior to visit.      Social History     Socioeconomic History    Marital status:      Spouse name: Not on file    Number of children: Not on file    Years of education: Not on file    Highest education level: Not on file   Occupational History    Not on file   Social Needs    Financial resource strain: Not on file    Food insecurity:     Worry: Not on file     Inability: Not on file    Transportation needs:     Medical: Not on file     Non-medical: Not on file   Tobacco Use    Smoking status: Current Every Day Smoker     Packs/day: 0.50     Start date: 6/3/2012   Substance and Sexual Activity    Alcohol use: Yes     Alcohol/week: 1.0 standard drinks     Types: 1 Glasses of wine per week    Drug use: No    Sexual activity: Yes     Partners: Male     Birth control/protection: Pill   Lifestyle    Physical activity:     Days per week: Not on file     Minutes per session: Not on file    Stress: Not on file   Relationships    Social connections:     Talks on phone: Not on file     Gets together: Not on file     Attends Adventist service: Not on file     Active member  "of club or organization: Not on file     Attends meetings of clubs or organizations: Not on file     Relationship status: Not on file   Other Topics Concern    Not on file   Social History Narrative    Not on file     Family History   Problem Relation Age of Onset    Diabetes Mother     Mental illness Mother         gambling problem    Uterine cancer Paternal Grandmother     Cancer Paternal Grandmother     Mental illness Sister         borderline personality disorder           ROS:  GENERAL: No fever, chills,  or significant weight changes.   CARDIOVASCULAR: Denies chest pain, PND, orthopnea or reduced exercise tolerance.  ABDOMEN: Appetite fine. Denies diarrhea, abdominal pain, hematemesis or blood in stool.  URINARY: No flank pain, dysuria or hematuria.    Vitals:    12/18/19 0957   BP: (!) 134/92   Pulse: 101   Temp: 98.2 °F (36.8 °C)   TempSrc: Oral   Weight: 97.7 kg (215 lb 6.2 oz)   Height: 5' 3" (1.6 m)       Wt Readings from Last 3 Encounters:   12/18/19 97.7 kg (215 lb 6.2 oz)   11/14/19 95.7 kg (211 lb)   11/11/19 95.7 kg (211 lb)       APPEARANCE: Well nourished, well developed, in no acute distress.    HEAD: Normocephalic.  Atraumatic.  EYES:   Right eye: Pupil reactive.  Conjunctiva clear.    Left eye: Pupil reactive.  Conjunctiva clear.    NECK: Supple. No bruits.  No JVD.  No cervical lymphadenopathy.  No thyromegaly.    CHEST: Breath sounds clear bilaterally.  Normal respiratory effort  CARDIOVASCULAR: Normal rate.  Regular rhythm.  No murmurs.  No rub.  No gallops.   No edema.  MENTAL STATUS: Alert.  Oriented x 3.  "

## 2020-01-21 RX ORDER — SERTRALINE HYDROCHLORIDE 100 MG/1
TABLET, FILM COATED ORAL
Qty: 30 TABLET | Refills: 5 | Status: SHIPPED | OUTPATIENT
Start: 2020-01-21 | End: 2020-03-19

## 2020-03-19 ENCOUNTER — OFFICE VISIT (OUTPATIENT)
Dept: FAMILY MEDICINE | Facility: CLINIC | Age: 25
End: 2020-03-19
Payer: MEDICAID

## 2020-03-19 DIAGNOSIS — F41.9 ANXIETY: Primary | ICD-10-CM

## 2020-03-19 DIAGNOSIS — F40.01 AGORAPHOBIA WITH PANIC ATTACKS: ICD-10-CM

## 2020-03-19 PROCEDURE — 99213 PR OFFICE/OUTPT VISIT, EST, LEVL III, 20-29 MIN: ICD-10-PCS | Mod: 95,,, | Performed by: FAMILY MEDICINE

## 2020-03-19 PROCEDURE — 99213 OFFICE O/P EST LOW 20 MIN: CPT | Mod: 95,,, | Performed by: FAMILY MEDICINE

## 2020-03-19 RX ORDER — SERTRALINE HYDROCHLORIDE 100 MG/1
150 TABLET, FILM COATED ORAL DAILY
Qty: 45 TABLET | Refills: 5 | Status: SHIPPED | OUTPATIENT
Start: 2020-03-19 | End: 2020-08-06

## 2020-03-19 RX ORDER — SERTRALINE HYDROCHLORIDE 100 MG/1
150 TABLET, FILM COATED ORAL DAILY
COMMUNITY
End: 2020-03-19 | Stop reason: SDUPTHER

## 2020-03-19 NOTE — PROGRESS NOTES
Primary Care Telemedicine Note    The patient location is:  Patient Home   The chief complaint leading to consultation is: per below note  Total time spent with patient: 10 min      Visit type: Virtual visit with synchronous audio only and video  Each patient to whom he or she provides medical services by telemedicine is:  (1) informed of the relationship between the physician and patient and the respective role of any other health care provider with respect to management of the patient; and (2) notified that he or she may decline to receive medical services by telemedicine and may withdraw from such care at any time.    Follow-up anxiety and panic attacks.  We increased her Zoloft.  She feels she is doing very well with this.  No SI/HI.  She would like continue current medication.  She denies any Significant side effects.    Diagnoses and all orders for this visit:    Anxiety    Agoraphobia with panic attacks    Other orders  -     sertraline (ZOLOFT) 100 MG tablet; Take 1.5 tablets (150 mg total) by mouth once daily.       She will continue with current medication.  Follow-up 6 months.  Follow-up as needed prior.         Past Medical History:  Past Medical History:   Diagnosis Date    Agoraphobia with panic attacks 1/16/2018    Anxiety 1/16/2018    Fibromyalgia     GERD (gastroesophageal reflux disease)     Migraines     Obesity (BMI 35.0-39.9 without comorbidity)     Pyelonephritis 2011    Urticaria 2008    lasted 3 months resolved     Past Surgical History:   Procedure Laterality Date    TONSILLECTOMY       Social History     Socioeconomic History    Marital status:      Spouse name: Not on file    Number of children: Not on file    Years of education: Not on file    Highest education level: Not on file   Occupational History    Not on file   Social Needs    Financial resource strain: Not on file    Food insecurity:     Worry: Not on file     Inability: Not on file    Transportation  needs:     Medical: Not on file     Non-medical: Not on file   Tobacco Use    Smoking status: Former Smoker     Packs/day: 0.50     Start date: 6/3/2012     Last attempt to quit: 2019     Years since quittin.2   Substance and Sexual Activity    Alcohol use: Yes     Alcohol/week: 1.0 standard drinks     Types: 1 Glasses of wine per week    Drug use: No    Sexual activity: Yes     Partners: Male     Birth control/protection: Pill   Lifestyle    Physical activity:     Days per week: Not on file     Minutes per session: Not on file    Stress: Not on file   Relationships    Social connections:     Talks on phone: Not on file     Gets together: Not on file     Attends Jew service: Not on file     Active member of club or organization: Not on file     Attends meetings of clubs or organizations: Not on file     Relationship status: Not on file   Other Topics Concern    Not on file   Social History Narrative    Not on file     Family History   Problem Relation Age of Onset    Diabetes Mother     Mental illness Mother         gambling problem    Uterine cancer Paternal Grandmother     Cancer Paternal Grandmother     Mental illness Sister         borderline personality disorder     Review of patient's allergies indicates:   Allergen Reactions    Bactrim [sulfamethoxazole-trimethoprim] Rash     Current Outpatient Medications on File Prior to Visit   Medication Sig Dispense Refill    acetaminophen (TYLENOL) 325 MG tablet Take 325 mg by mouth every 6 (six) hours as needed for Pain.      ALPRAZolam (XANAX) 0.25 MG tablet Take 1 tablet (0.25 mg total) by mouth 2 (two) times daily as needed for Anxiety.  0    esomeprazole (NEXIUM) 20 MG capsule Take 1 capsule (20 mg total) by mouth once daily. 90 capsule 3    naproxen (NAPROSYN) 500 MG tablet TAKE 1 TABLET (500 MG TOTAL) BY MOUTH 2 (TWO) TIMES DAILY AS NEEDED (MIGRAINE). 60 tablet 5    norgestimate-ethinyl estradiol (ORTHO TRI-CYCLEN LO)  0.18/0.215/0.25 mg-25 mcg tablet Take 1 tablet by mouth once daily. 30 tablet 11    [DISCONTINUED] sertraline (ZOLOFT) 100 MG tablet TAKE 1 TABLET BY MOUTH EVERY DAY 30 tablet 5    [DISCONTINUED] sertraline (ZOLOFT) 100 MG tablet Take 150 mg by mouth once daily.       No current facility-administered medications on file prior to visit.        There were no vitals filed for this visit.    Wt Readings from Last 3 Encounters:   12/18/19 97.7 kg (215 lb 6.2 oz)   11/14/19 95.7 kg (211 lb)   11/11/19 95.7 kg (211 lb)       APPEARANCE: Well nourished, well developed, in no acute distress.    MENTAL STATUS: Alert.  Oriented x 3.  Normal affect.

## 2020-05-15 ENCOUNTER — PATIENT MESSAGE (OUTPATIENT)
Dept: FAMILY MEDICINE | Facility: CLINIC | Age: 25
End: 2020-05-15

## 2020-05-15 ENCOUNTER — TELEPHONE (OUTPATIENT)
Dept: FAMILY MEDICINE | Facility: CLINIC | Age: 25
End: 2020-05-15

## 2020-05-15 RX ORDER — TRAMADOL HYDROCHLORIDE 50 MG/1
50 TABLET ORAL EVERY 8 HOURS PRN
Qty: 15 TABLET | Refills: 0 | Status: SHIPPED | OUTPATIENT
Start: 2020-05-15 | End: 2020-05-20

## 2020-05-15 RX ORDER — PHENAZOPYRIDINE HYDROCHLORIDE 200 MG/1
200 TABLET, FILM COATED ORAL 2 TIMES DAILY PRN
Qty: 6 TABLET | Refills: 0 | Status: SHIPPED | OUTPATIENT
Start: 2020-05-15 | End: 2020-05-18

## 2020-05-15 NOTE — TELEPHONE ENCOUNTER
Patient went to [facility] er for possible UTI and was diagnosed, per patient:   I had rocephin given via IV and am taking Cipro for the infection. I am in an immense amount of pain, though, which does not seem to be changed at all by the Naproxen prescribed for the pain.       ----- Message -----     Please advise

## 2020-08-06 ENCOUNTER — OFFICE VISIT (OUTPATIENT)
Dept: FAMILY MEDICINE | Facility: CLINIC | Age: 25
End: 2020-08-06
Payer: MEDICAID

## 2020-08-06 VITALS
HEIGHT: 63 IN | DIASTOLIC BLOOD PRESSURE: 86 MMHG | WEIGHT: 222 LBS | SYSTOLIC BLOOD PRESSURE: 136 MMHG | BODY MASS INDEX: 39.34 KG/M2 | HEART RATE: 76 BPM | TEMPERATURE: 99 F

## 2020-08-06 DIAGNOSIS — L65.9 HAIR THINNING: ICD-10-CM

## 2020-08-06 DIAGNOSIS — E66.01 SEVERE OBESITY: ICD-10-CM

## 2020-08-06 DIAGNOSIS — F41.9 ANXIETY: ICD-10-CM

## 2020-08-06 DIAGNOSIS — F40.01 AGORAPHOBIA WITH PANIC ATTACKS: ICD-10-CM

## 2020-08-06 DIAGNOSIS — N92.1 BREAKTHROUGH BLEEDING ON BIRTH CONTROL PILLS: Primary | ICD-10-CM

## 2020-08-06 PROBLEM — Z34.81 SUPERVISION OF NORMAL INTRAUTERINE PREGNANCY IN MULTIGRAVIDA IN FIRST TRIMESTER: Status: RESOLVED | Noted: 2018-08-03 | Resolved: 2020-08-06

## 2020-08-06 PROCEDURE — 99214 PR OFFICE/OUTPT VISIT, EST, LEVL IV, 30-39 MIN: ICD-10-PCS | Mod: S$PBB,,, | Performed by: FAMILY MEDICINE

## 2020-08-06 PROCEDURE — 99213 OFFICE O/P EST LOW 20 MIN: CPT | Mod: PBBFAC,PO | Performed by: FAMILY MEDICINE

## 2020-08-06 PROCEDURE — 99999 PR PBB SHADOW E&M-EST. PATIENT-LVL III: CPT | Mod: PBBFAC,,, | Performed by: FAMILY MEDICINE

## 2020-08-06 PROCEDURE — 99999 PR PBB SHADOW E&M-EST. PATIENT-LVL III: ICD-10-PCS | Mod: PBBFAC,,, | Performed by: FAMILY MEDICINE

## 2020-08-06 PROCEDURE — 99214 OFFICE O/P EST MOD 30 MIN: CPT | Mod: S$PBB,,, | Performed by: FAMILY MEDICINE

## 2020-08-06 RX ORDER — DEXAMETHASONE 1 MG/1
1 TABLET ORAL ONCE
Qty: 1 TABLET | Refills: 0 | Status: SHIPPED | OUTPATIENT
Start: 2020-08-06 | End: 2020-08-06

## 2020-08-06 RX ORDER — ALPRAZOLAM 0.25 MG/1
0.25 TABLET ORAL 2 TIMES DAILY PRN
Qty: 20 TABLET | Refills: 0 | Status: SHIPPED | OUTPATIENT
Start: 2020-08-06

## 2020-08-06 RX ORDER — SERTRALINE HYDROCHLORIDE 100 MG/1
200 TABLET, FILM COATED ORAL DAILY
Qty: 180 TABLET | Refills: 1 | Status: SHIPPED | OUTPATIENT
Start: 2020-08-06 | End: 2020-09-03

## 2020-08-06 NOTE — PROGRESS NOTES
Patient had some spotting midcycle in July then regular period starting end of July continuing to present.  Has done well previously with oral contraceptive.  She does state she took 3-pregnancy test-negative.  She denies abdominal pain.  She does feel like she gets some hair thinning.  She has had concerns regarding anxiety and panic attacks.  Zoloft not working as well as previously.  She was concerned regarding possible hypercortisolism due to the hair thinning and obesity.        Tomi was seen today for anxiety and menstrual problem.    Diagnoses and all orders for this visit:    Breakthrough bleeding on birth control pills  -     CBC auto differential; Future  -     Comprehensive metabolic panel; Future  -     TSH; Future  -     Cortisol, 8AM; Future    Anxiety    Agoraphobia with panic attacks    Hair thinning  -     CBC auto differential; Future  -     Comprehensive metabolic panel; Future  -     TSH; Future  -     Cortisol, 8AM; Future    Severe obesity  -     Cortisol, 8AM; Future    Other orders  -     sertraline (ZOLOFT) 100 MG tablet; Take 2 tablets (200 mg total) by mouth once daily.  -     ALPRAZolam (XANAX) 0.25 MG tablet; Take 1 tablet (0.25 mg total) by mouth 2 (two) times daily as needed for Anxiety.  -     dexAMETHasone (DECADRON) 1 MG Tab; Take 1 tablet (1 mg total) by mouth once. At 11 PM on the night prior to laboratory test for 1 dose      Follow-up 1 month            Past Medical History:  Past Medical History:   Diagnosis Date    Agoraphobia with panic attacks 1/16/2018    Anxiety 1/16/2018    Fibromyalgia     GERD (gastroesophageal reflux disease)     Migraines     Obesity (BMI 35.0-39.9 without comorbidity)     Pyelonephritis 2011    Urticaria 2008    lasted 3 months resolved     Past Surgical History:   Procedure Laterality Date    TONSILLECTOMY       Review of patient's allergies indicates:   Allergen Reactions    Bactrim [sulfamethoxazole-trimethoprim] Rash     Current  Outpatient Medications on File Prior to Visit   Medication Sig Dispense Refill    esomeprazole (NEXIUM) 20 MG capsule TAKE 1 CAPSULE BY MOUTH EVERY DAY 90 capsule 3    norgestimate-ethinyl estradiol (ORTHO TRI-CYCLEN LO) 0.18/0.215/0.25 mg-25 mcg tablet Take 1 tablet by mouth once daily. 30 tablet 11    [DISCONTINUED] sertraline (ZOLOFT) 100 MG tablet Take 1.5 tablets (150 mg total) by mouth once daily. 45 tablet 5    acetaminophen (TYLENOL) 325 MG tablet Take 325 mg by mouth every 6 (six) hours as needed for Pain.      naproxen (NAPROSYN) 500 MG tablet TAKE 1 TABLET (500 MG TOTAL) BY MOUTH 2 (TWO) TIMES DAILY AS NEEDED (MIGRAINE). (Patient not taking: Reported on 2020) 60 tablet 5    [DISCONTINUED] ALPRAZolam (XANAX) 0.25 MG tablet Take 1 tablet (0.25 mg total) by mouth 2 (two) times daily as needed for Anxiety.  0     No current facility-administered medications on file prior to visit.      Social History     Socioeconomic History    Marital status:      Spouse name: Not on file    Number of children: Not on file    Years of education: Not on file    Highest education level: Not on file   Occupational History    Not on file   Social Needs    Financial resource strain: Not very hard    Food insecurity     Worry: Never true     Inability: Never true    Transportation needs     Medical: No     Non-medical: No   Tobacco Use    Smoking status: Former Smoker     Packs/day: 0.50     Start date: 6/3/2012     Quit date: 2019     Years since quittin.6   Substance and Sexual Activity    Alcohol use: Yes     Alcohol/week: 1.0 standard drinks     Types: 1 Glasses of wine per week     Frequency: 2-4 times a month     Drinks per session: 1 or 2     Binge frequency: Never    Drug use: No    Sexual activity: Yes     Partners: Male     Birth control/protection: Pill   Lifestyle    Physical activity     Days per week: 1 day     Minutes per session: 60 min    Stress: Very much   Relationships  "   Social connections     Talks on phone: Twice a week     Gets together: Never     Attends Hinduism service: Not on file     Active member of club or organization: No     Attends meetings of clubs or organizations: Never     Relationship status:    Other Topics Concern    Not on file   Social History Narrative    Not on file     Family History   Problem Relation Age of Onset    Diabetes Mother     Mental illness Mother         gambling problem    Uterine cancer Paternal Grandmother     Cancer Paternal Grandmother     Mental illness Sister         borderline personality disorder       Answers for HPI/ROS submitted by the patient on 8/4/2020   activity change: No  unexpected weight change: No  neck pain: No  hearing loss: No  rhinorrhea: No  trouble swallowing: No  eye discharge: No  visual disturbance: No  chest tightness: No  wheezing: No  chest pain: No  palpitations: No  blood in stool: No  constipation: No  vomiting: No  diarrhea: No  polydipsia: No  polyuria: No  difficulty urinating: No  hematuria: No  menstrual problem: Yes  dysuria: No  joint swelling: No  arthralgias: Yes  headaches: Yes  weakness: No  confusion: No  dysphoric mood: No    Vitals:    08/06/20 1107 08/06/20 1153   BP: (!) 128/92 136/86   Pulse: 76    Temp: 98.8 °F (37.1 °C)    Weight: 100.7 kg (222 lb)    Height: 5' 3" (1.6 m)      Wt Readings from Last 3 Encounters:   08/06/20 100.7 kg (222 lb)   12/18/19 97.7 kg (215 lb 6.2 oz)   11/14/19 95.7 kg (211 lb)       OBJECTIVE:   APPEARANCE: Well nourished, well developed, in no acute distress.    HEAD: Normocephalic.  Atraumatic.  No sinus tenderness.  EYES:   Right eye: Pupil reactive.  Conjunctiva clear.    Left eye: Pupil reactive.  Conjunctiva clear.  EOMI.    EARS: TM's intact. Light reflex normal. No retraction or perforation.    NOSE:  clear.  MOUTH & THROAT:  No pharyngeal erythema or exudate. No lesions.  NECK: Supple. No bruits.  No JVD.  No cervical lymphadenopathy.  " No thyromegaly.    CHEST: Breath sounds clear bilaterally.  Normal respiratory effort  CARDIOVASCULAR: Normal rate.  Regular rhythm.  No murmurs.  No rub.  No gallops.  ABDOMEN: Bowel sounds normal.  Soft.  No tenderness.  No organomegaly.  PERIPHERAL VASCULAR: No cyanosis.  No clubbing.  No edema.  NEUROLOGIC: No focal findings.  MENTAL STATUS: Alert.  Oriented x 3.  Scalp appears normal

## 2020-08-11 ENCOUNTER — LAB VISIT (OUTPATIENT)
Dept: LAB | Facility: HOSPITAL | Age: 25
End: 2020-08-11
Attending: FAMILY MEDICINE
Payer: MEDICAID

## 2020-08-11 DIAGNOSIS — N92.1 BREAKTHROUGH BLEEDING ON BIRTH CONTROL PILLS: ICD-10-CM

## 2020-08-11 DIAGNOSIS — L65.9 HAIR THINNING: ICD-10-CM

## 2020-08-11 LAB
ALBUMIN SERPL BCP-MCNC: 3.8 G/DL (ref 3.5–5.2)
ALP SERPL-CCNC: 93 U/L (ref 55–135)
ALT SERPL W/O P-5'-P-CCNC: 18 U/L (ref 10–44)
ANION GAP SERPL CALC-SCNC: 12 MMOL/L (ref 8–16)
AST SERPL-CCNC: 21 U/L (ref 10–40)
BASOPHILS # BLD AUTO: 0.07 K/UL (ref 0–0.2)
BASOPHILS NFR BLD: 0.7 % (ref 0–1.9)
BILIRUB SERPL-MCNC: 0.3 MG/DL (ref 0.1–1)
BUN SERPL-MCNC: 9 MG/DL (ref 6–20)
CALCIUM SERPL-MCNC: 9.2 MG/DL (ref 8.7–10.5)
CHLORIDE SERPL-SCNC: 105 MMOL/L (ref 95–110)
CO2 SERPL-SCNC: 20 MMOL/L (ref 23–29)
CREAT SERPL-MCNC: 0.8 MG/DL (ref 0.5–1.4)
DIFFERENTIAL METHOD: ABNORMAL
EOSINOPHIL # BLD AUTO: 1.1 K/UL (ref 0–0.5)
EOSINOPHIL NFR BLD: 10.5 % (ref 0–8)
ERYTHROCYTE [DISTWIDTH] IN BLOOD BY AUTOMATED COUNT: 13.8 % (ref 11.5–14.5)
EST. GFR  (AFRICAN AMERICAN): >60 ML/MIN/1.73 M^2
EST. GFR  (NON AFRICAN AMERICAN): >60 ML/MIN/1.73 M^2
GLUCOSE SERPL-MCNC: 79 MG/DL (ref 70–110)
HCT VFR BLD AUTO: 42.5 % (ref 37–48.5)
HGB BLD-MCNC: 13 G/DL (ref 12–16)
IMM GRANULOCYTES # BLD AUTO: 0.04 K/UL (ref 0–0.04)
IMM GRANULOCYTES NFR BLD AUTO: 0.4 % (ref 0–0.5)
LYMPHOCYTES # BLD AUTO: 1.9 K/UL (ref 1–4.8)
LYMPHOCYTES NFR BLD: 17.8 % (ref 18–48)
MCH RBC QN AUTO: 26.6 PG (ref 27–31)
MCHC RBC AUTO-ENTMCNC: 30.6 G/DL (ref 32–36)
MCV RBC AUTO: 87 FL (ref 82–98)
MONOCYTES # BLD AUTO: 0.6 K/UL (ref 0.3–1)
MONOCYTES NFR BLD: 5.8 % (ref 4–15)
NEUTROPHILS # BLD AUTO: 6.8 K/UL (ref 1.8–7.7)
NEUTROPHILS NFR BLD: 64.8 % (ref 38–73)
NRBC BLD-RTO: 0 /100 WBC
PLATELET # BLD AUTO: 290 K/UL (ref 150–350)
PMV BLD AUTO: 11 FL (ref 9.2–12.9)
POTASSIUM SERPL-SCNC: 4.2 MMOL/L (ref 3.5–5.1)
PROT SERPL-MCNC: 7.4 G/DL (ref 6–8.4)
RBC # BLD AUTO: 4.88 M/UL (ref 4–5.4)
SODIUM SERPL-SCNC: 137 MMOL/L (ref 136–145)
TSH SERPL DL<=0.005 MIU/L-ACNC: 1.33 UIU/ML (ref 0.4–4)
WBC # BLD AUTO: 10.54 K/UL (ref 3.9–12.7)

## 2020-08-11 PROCEDURE — 80053 COMPREHEN METABOLIC PANEL: CPT

## 2020-08-11 PROCEDURE — 84443 ASSAY THYROID STIM HORMONE: CPT

## 2020-08-11 PROCEDURE — 36415 COLL VENOUS BLD VENIPUNCTURE: CPT | Mod: PO

## 2020-08-11 PROCEDURE — 85025 COMPLETE CBC W/AUTO DIFF WBC: CPT

## 2020-08-13 ENCOUNTER — TELEPHONE (OUTPATIENT)
Dept: FAMILY MEDICINE | Facility: CLINIC | Age: 25
End: 2020-08-13

## 2020-08-13 ENCOUNTER — LAB VISIT (OUTPATIENT)
Dept: LAB | Facility: HOSPITAL | Age: 25
End: 2020-08-13
Attending: FAMILY MEDICINE
Payer: MEDICAID

## 2020-08-13 DIAGNOSIS — L65.9 HAIR THINNING: ICD-10-CM

## 2020-08-13 DIAGNOSIS — E66.01 SEVERE OBESITY: ICD-10-CM

## 2020-08-13 DIAGNOSIS — N92.1 BREAKTHROUGH BLEEDING ON BIRTH CONTROL PILLS: ICD-10-CM

## 2020-08-13 LAB — CORTIS SERPL-MCNC: <1 UG/DL (ref 4.3–22.4)

## 2020-08-13 PROCEDURE — 36415 COLL VENOUS BLD VENIPUNCTURE: CPT | Mod: PO

## 2020-08-13 PROCEDURE — 82533 TOTAL CORTISOL: CPT

## 2020-08-13 RX ORDER — OMEPRAZOLE 20 MG/1
20 CAPSULE, DELAYED RELEASE ORAL DAILY
Qty: 90 CAPSULE | Refills: 3 | Status: SHIPPED | OUTPATIENT
Start: 2020-08-13 | End: 2020-09-03 | Stop reason: SDUPTHER

## 2020-08-13 NOTE — TELEPHONE ENCOUNTER
Esomeprazole is not covered, preferred is lansoprazole capsules, generic, omeprazole capsules, generic, pantoprazole tablet, generic and brand protonix suspension, please advise.

## 2020-08-31 RX ORDER — NORGESTIMATE AND ETHINYL ESTRADIOL 7DAYSX3 LO
KIT ORAL
Qty: 84 TABLET | Refills: 0 | Status: SHIPPED | OUTPATIENT
Start: 2020-08-31 | End: 2020-11-06 | Stop reason: SDUPTHER

## 2020-09-03 ENCOUNTER — OFFICE VISIT (OUTPATIENT)
Dept: FAMILY MEDICINE | Facility: CLINIC | Age: 25
End: 2020-09-03
Payer: MEDICAID

## 2020-09-03 VITALS
TEMPERATURE: 99 F | WEIGHT: 222 LBS | HEART RATE: 80 BPM | DIASTOLIC BLOOD PRESSURE: 82 MMHG | HEIGHT: 63 IN | SYSTOLIC BLOOD PRESSURE: 128 MMHG | BODY MASS INDEX: 39.34 KG/M2

## 2020-09-03 DIAGNOSIS — F41.9 ANXIETY: ICD-10-CM

## 2020-09-03 DIAGNOSIS — K21.9 GASTROESOPHAGEAL REFLUX DISEASE, ESOPHAGITIS PRESENCE NOT SPECIFIED: ICD-10-CM

## 2020-09-03 DIAGNOSIS — F40.01 AGORAPHOBIA WITH PANIC ATTACKS: ICD-10-CM

## 2020-09-03 DIAGNOSIS — L02.31 ABSCESS OF BUTTOCK, RIGHT: Primary | ICD-10-CM

## 2020-09-03 LAB — B-HCG UR QL: NEGATIVE

## 2020-09-03 PROCEDURE — 99213 OFFICE O/P EST LOW 20 MIN: CPT | Mod: PBBFAC,PO | Performed by: FAMILY MEDICINE

## 2020-09-03 PROCEDURE — 99999 PR PBB SHADOW E&M-EST. PATIENT-LVL III: CPT | Mod: PBBFAC,,, | Performed by: FAMILY MEDICINE

## 2020-09-03 PROCEDURE — 81025 URINE PREGNANCY TEST: CPT | Mod: PO

## 2020-09-03 PROCEDURE — 99214 PR OFFICE/OUTPT VISIT, EST, LEVL IV, 30-39 MIN: ICD-10-PCS | Mod: S$PBB,,, | Performed by: FAMILY MEDICINE

## 2020-09-03 PROCEDURE — 99214 OFFICE O/P EST MOD 30 MIN: CPT | Mod: S$PBB,,, | Performed by: FAMILY MEDICINE

## 2020-09-03 PROCEDURE — 99999 PR PBB SHADOW E&M-EST. PATIENT-LVL III: ICD-10-PCS | Mod: PBBFAC,,, | Performed by: FAMILY MEDICINE

## 2020-09-03 RX ORDER — VENLAFAXINE HYDROCHLORIDE 37.5 MG/1
CAPSULE, EXTENDED RELEASE ORAL
Qty: 60 CAPSULE | Refills: 1 | Status: SHIPPED | OUTPATIENT
Start: 2020-09-03 | End: 2020-09-29

## 2020-09-03 RX ORDER — OMEPRAZOLE 20 MG/1
20 CAPSULE, DELAYED RELEASE ORAL DAILY
Qty: 90 CAPSULE | Refills: 3 | Status: SHIPPED | OUTPATIENT
Start: 2020-09-03 | End: 2021-07-23

## 2020-09-03 RX ORDER — SERTRALINE HYDROCHLORIDE 100 MG/1
TABLET, FILM COATED ORAL
Qty: 180 TABLET | Refills: 1 | COMMUNITY
Start: 2020-09-03 | End: 2020-09-29

## 2020-09-03 RX ORDER — DOXYCYCLINE 100 MG/1
100 CAPSULE ORAL 2 TIMES DAILY
Qty: 20 CAPSULE | Refills: 0 | Status: SHIPPED | OUTPATIENT
Start: 2020-09-03 | End: 2020-09-13

## 2020-09-03 RX ORDER — MUPIROCIN 20 MG/G
OINTMENT TOPICAL 2 TIMES DAILY
Qty: 30 G | Refills: 1 | Status: SHIPPED | OUTPATIENT
Start: 2020-09-03 | End: 2020-09-13

## 2020-09-03 NOTE — PROGRESS NOTES
She complains of a boil noticed about a month ago at the buttock area.  She has had 1 on either side near the crease which have resolved drained and recurred.  No fever.  Prior history of MRSA.  Anxiety with continued symptoms despite Zoloft.  She has had some panic attacks intake and Xanax a few times this week.  Gastroesophageal reflux needs medication change due to pharmacy formulary change.      Tomi was seen today for recurrent skin infections.    Diagnoses and all orders for this visit:    Abscess of buttock, right  -     Pregnancy, urine rapid    Anxiety    Agoraphobia with panic attacks    Gastroesophageal reflux disease, esophagitis presence not specified    Other orders  -     doxycycline (MONODOX) 100 MG capsule; Take 1 capsule (100 mg total) by mouth 2 (two) times daily. for 10 days  -     mupirocin (BACTROBAN) 2 % ointment; Apply topically 2 (two) times daily. Apply to nostrils, armpits and groin for 10 days  -     omeprazole (PRILOSEC) 20 MG capsule; Take 1 capsule (20 mg total) by mouth once daily.  -     venlafaxine (EFFEXOR-XR) 37.5 MG 24 hr capsule; Take 1 daily for 1 week, then take 2 daily     Resolving abscess.  Nothing to drain currently.  May use warm compresses to the area.  Follow-up if not resolving.  Taper Zoloft.  Follow-up 1 month.              Past Medical History:  Past Medical History:   Diagnosis Date    Agoraphobia with panic attacks 1/16/2018    Anxiety 1/16/2018    Fibromyalgia     GERD (gastroesophageal reflux disease)     Migraines     Obesity (BMI 35.0-39.9 without comorbidity)     Pyelonephritis 2011    Urticaria 2008    lasted 3 months resolved     Past Surgical History:   Procedure Laterality Date    TONSILLECTOMY       Review of patient's allergies indicates:   Allergen Reactions    Bactrim [sulfamethoxazole-trimethoprim] Rash     Current Outpatient Medications on File Prior to Visit   Medication Sig Dispense Refill    acetaminophen (TYLENOL) 325 MG tablet Take  325 mg by mouth every 6 (six) hours as needed for Pain.      ALPRAZolam (XANAX) 0.25 MG tablet Take 1 tablet (0.25 mg total) by mouth 2 (two) times daily as needed for Anxiety. 20 tablet 0    naproxen (NAPROSYN) 500 MG tablet TAKE 1 TABLET (500 MG TOTAL) BY MOUTH 2 (TWO) TIMES DAILY AS NEEDED (MIGRAINE). 60 tablet 5    sertraline (ZOLOFT) 100 MG tablet Take 1 daily for 1 week then stop 180 tablet 1    TRI-LO-SPRINTEC 0.18/0.215/0.25 mg-25 mcg tablet TAKE 1 TABLET BY MOUTH EVERY DAY 84 tablet 0    [DISCONTINUED] esomeprazole magnesium (NEXIUM ORAL) Take by mouth once daily.      [DISCONTINUED] omeprazole (PRILOSEC) 20 MG capsule Take 1 capsule (20 mg total) by mouth once daily. 90 capsule 3    [DISCONTINUED] sertraline (ZOLOFT) 100 MG tablet Take 2 tablets (200 mg total) by mouth once daily. 180 tablet 1     No current facility-administered medications on file prior to visit.      Social History     Socioeconomic History    Marital status:      Spouse name: Not on file    Number of children: Not on file    Years of education: Not on file    Highest education level: Not on file   Occupational History    Not on file   Social Needs    Financial resource strain: Not very hard    Food insecurity     Worry: Never true     Inability: Never true    Transportation needs     Medical: No     Non-medical: No   Tobacco Use    Smoking status: Former Smoker     Packs/day: 0.50     Start date: 6/3/2012     Quit date: 2019     Years since quittin.7   Substance and Sexual Activity    Alcohol use: Yes     Alcohol/week: 1.0 standard drinks     Types: 1 Glasses of wine per week     Frequency: 2-4 times a month     Drinks per session: 1 or 2     Binge frequency: Never    Drug use: No    Sexual activity: Yes     Partners: Male     Birth control/protection: Pill   Lifestyle    Physical activity     Days per week: 1 day     Minutes per session: 60 min    Stress: Very much   Relationships    Social  "connections     Talks on phone: Twice a week     Gets together: Never     Attends Judaism service: Not on file     Active member of club or organization: No     Attends meetings of clubs or organizations: Never     Relationship status:    Other Topics Concern    Not on file   Social History Narrative    Not on file     Family History   Problem Relation Age of Onset    Diabetes Mother     Mental illness Mother         gambling problem    Uterine cancer Paternal Grandmother     Cancer Paternal Grandmother     Mental illness Sister         borderline personality disorder           ROS:  GENERAL: No fever, chills,  or significant weight changes.   CARDIOVASCULAR: Denies chest pain, PND, orthopnea or reduced exercise tolerance.  ABDOMEN: Appetite fine. Denies diarrhea, abdominal pain, hematemesis or blood in stool.  URINARY: No flank pain, dysuria or hematuria.    Vitals:    09/03/20 1518   BP: 128/82   Pulse: 80   Temp: 98.8 °F (37.1 °C)   Weight: 100.7 kg (222 lb)   Height: 5' 3" (1.6 m)       Wt Readings from Last 3 Encounters:   09/03/20 100.7 kg (222 lb)   08/06/20 100.7 kg (222 lb)   12/18/19 97.7 kg (215 lb 6.2 oz)       APPEARANCE: Well nourished, well developed, in no acute distress.    HEAD: Normocephalic.  Atraumatic.  EYES:   Right eye: Pupil reactive.  Conjunctiva clear.    Left eye: Pupil reactive.  Conjunctiva clear.    NECK: Supple.  MENTAL STATUS: Alert.  Oriented x 3.  Skin:  She has what appears to be resolving skin abscess at the right medial buttock near the crease.  She also has a similar older appearing area on the left.  Small nodule palpable, but no fluctuance or current discharge.  Appears to have had some discharge previously but none at present.  Nothing at the midline.  "

## 2020-09-14 ENCOUNTER — TELEPHONE (OUTPATIENT)
Dept: FAMILY MEDICINE | Facility: CLINIC | Age: 25
End: 2020-09-14

## 2020-09-15 ENCOUNTER — TELEPHONE (OUTPATIENT)
Dept: FAMILY MEDICINE | Facility: CLINIC | Age: 25
End: 2020-09-15

## 2020-09-15 NOTE — TELEPHONE ENCOUNTER
If she is still taking the naproxen then we can resubmit a prior authorization due to her being on nonsteroidal anti-inflammatory drugs.  If she is not taking this then we may not be able to get it approved.   She could get omeprazole over-the-counter as a generic as well.  She can also possibly try Pepcid or Tagamet which is gotten either prescription or over-the-counter if it is available..

## 2020-09-16 ENCOUNTER — PATIENT MESSAGE (OUTPATIENT)
Dept: FAMILY MEDICINE | Facility: CLINIC | Age: 25
End: 2020-09-16

## 2020-09-18 ENCOUNTER — PATIENT MESSAGE (OUTPATIENT)
Dept: FAMILY MEDICINE | Facility: CLINIC | Age: 25
End: 2020-09-18

## 2020-09-29 ENCOUNTER — OFFICE VISIT (OUTPATIENT)
Dept: FAMILY MEDICINE | Facility: CLINIC | Age: 25
End: 2020-09-29
Payer: MEDICAID

## 2020-09-29 VITALS
TEMPERATURE: 99 F | DIASTOLIC BLOOD PRESSURE: 79 MMHG | BODY MASS INDEX: 39.16 KG/M2 | WEIGHT: 221 LBS | SYSTOLIC BLOOD PRESSURE: 122 MMHG | HEIGHT: 63 IN | HEART RATE: 99 BPM

## 2020-09-29 DIAGNOSIS — E66.01 SEVERE OBESITY: ICD-10-CM

## 2020-09-29 DIAGNOSIS — F41.9 ANXIETY: ICD-10-CM

## 2020-09-29 DIAGNOSIS — F40.01 AGORAPHOBIA WITH PANIC ATTACKS: Primary | ICD-10-CM

## 2020-09-29 DIAGNOSIS — Z86.14 HISTORY OF METHICILLIN RESISTANT STAPHYLOCOCCUS AUREUS (MRSA): ICD-10-CM

## 2020-09-29 DIAGNOSIS — F32.A DEPRESSION, UNSPECIFIED DEPRESSION TYPE: ICD-10-CM

## 2020-09-29 PROCEDURE — 99999 PR PBB SHADOW E&M-EST. PATIENT-LVL III: CPT | Mod: PBBFAC,,, | Performed by: FAMILY MEDICINE

## 2020-09-29 PROCEDURE — 90686 IIV4 VACC NO PRSV 0.5 ML IM: CPT | Mod: PBBFAC,PO

## 2020-09-29 PROCEDURE — 99999 PR PBB SHADOW E&M-EST. PATIENT-LVL III: ICD-10-PCS | Mod: PBBFAC,,, | Performed by: FAMILY MEDICINE

## 2020-09-29 PROCEDURE — 99213 OFFICE O/P EST LOW 20 MIN: CPT | Mod: S$PBB,,, | Performed by: FAMILY MEDICINE

## 2020-09-29 PROCEDURE — 99213 OFFICE O/P EST LOW 20 MIN: CPT | Mod: PBBFAC,PO | Performed by: FAMILY MEDICINE

## 2020-09-29 PROCEDURE — 99213 PR OFFICE/OUTPT VISIT, EST, LEVL III, 20-29 MIN: ICD-10-PCS | Mod: S$PBB,,, | Performed by: FAMILY MEDICINE

## 2020-09-29 RX ORDER — VENLAFAXINE HYDROCHLORIDE 150 MG/1
150 CAPSULE, EXTENDED RELEASE ORAL DAILY
Qty: 30 CAPSULE | Refills: 1 | Status: SHIPPED | OUTPATIENT
Start: 2020-09-29 | End: 2020-10-29

## 2020-09-29 RX ORDER — CHLORHEXIDINE GLUCONATE 40 MG/ML
SOLUTION TOPICAL DAILY
Qty: 473 ML | Refills: 1 | Status: SHIPPED | OUTPATIENT
Start: 2020-09-29 | End: 2020-10-13

## 2020-09-29 NOTE — PROGRESS NOTES
Follow-up anxiety panic attacks depression.  Recently changed from Zoloft to Effexor.  She did notice some worsening of symptoms initially, now back to baseline.  Still getting some anxiety with a few panic attacks.  Depression better.  No SI/HI.  Recent MRSA abscess has resolved.  Obesity discussed diet.    Tomi was seen today for follow-up.    Diagnoses and all orders for this visit:    Agoraphobia with panic attacks    Anxiety    Depression, unspecified depression type    History of methicillin resistant staphylococcus aureus (MRSA)    Severe obesity    Other orders  -     venlafaxine (EFFEXOR-XR) 150 MG Cp24; Take 1 capsule (150 mg total) by mouth once daily.  -     chlorhexidine (HIBICLENS) 4 % external liquid; Apply topically once daily. Wash with daily. Do not get in eyes. for 14 days  -     Influenza - Quadrivalent (PF)      Increase Effexor.  Follow-up 1 month.            Past Medical History:  Past Medical History:   Diagnosis Date    Agoraphobia with panic attacks 1/16/2018    Anxiety 1/16/2018    Fibromyalgia     GERD (gastroesophageal reflux disease)     Migraines     Obesity (BMI 35.0-39.9 without comorbidity)     Pyelonephritis 2011    Urticaria 2008    lasted 3 months resolved     Past Surgical History:   Procedure Laterality Date    TONSILLECTOMY       Review of patient's allergies indicates:   Allergen Reactions    Bactrim [sulfamethoxazole-trimethoprim] Rash     Current Outpatient Medications on File Prior to Visit   Medication Sig Dispense Refill    acetaminophen (TYLENOL) 325 MG tablet Take 325 mg by mouth every 6 (six) hours as needed for Pain.      ALPRAZolam (XANAX) 0.25 MG tablet Take 1 tablet (0.25 mg total) by mouth 2 (two) times daily as needed for Anxiety. 20 tablet 0    naproxen (NAPROSYN) 500 MG tablet TAKE 1 TABLET (500 MG TOTAL) BY MOUTH 2 (TWO) TIMES DAILY AS NEEDED (MIGRAINE). 60 tablet 5    omeprazole (PRILOSEC) 20 MG capsule Take 1 capsule (20 mg total) by mouth  once daily. 90 capsule 3    TRI-LO-SPRINTEC 0.18/0.215/0.25 mg-25 mcg tablet TAKE 1 TABLET BY MOUTH EVERY DAY 84 tablet 0    [DISCONTINUED] venlafaxine (EFFEXOR-XR) 37.5 MG 24 hr capsule Take 1 daily for 1 week, then take 2 daily 60 capsule 1    [DISCONTINUED] sertraline (ZOLOFT) 100 MG tablet Take 1 daily for 1 week then stop 180 tablet 1     No current facility-administered medications on file prior to visit.      Social History     Socioeconomic History    Marital status:      Spouse name: Not on file    Number of children: Not on file    Years of education: Not on file    Highest education level: Not on file   Occupational History    Not on file   Social Needs    Financial resource strain: Not very hard    Food insecurity     Worry: Never true     Inability: Never true    Transportation needs     Medical: No     Non-medical: No   Tobacco Use    Smoking status: Former Smoker     Packs/day: 0.50     Start date: 6/3/2012     Quit date: 2019     Years since quittin.8   Substance and Sexual Activity    Alcohol use: Yes     Alcohol/week: 1.0 standard drinks     Types: 1 Glasses of wine per week     Frequency: 2-4 times a month     Drinks per session: 1 or 2     Binge frequency: Never    Drug use: No    Sexual activity: Yes     Partners: Male     Birth control/protection: Pill   Lifestyle    Physical activity     Days per week: 1 day     Minutes per session: 60 min    Stress: Very much   Relationships    Social connections     Talks on phone: Twice a week     Gets together: Never     Attends Judaism service: Not on file     Active member of club or organization: No     Attends meetings of clubs or organizations: Never     Relationship status:    Other Topics Concern    Not on file   Social History Narrative    Not on file     Family History   Problem Relation Age of Onset    Diabetes Mother     Mental illness Mother         gambling problem    Uterine cancer Paternal  "Grandmother     Cancer Paternal Grandmother     Mental illness Sister         borderline personality disorder           ROS:  GENERAL: No fever, chills,  or significant weight changes.   CARDIOVASCULAR: Denies chest pain, PND, orthopnea or reduced exercise tolerance.  ABDOMEN: Appetite fine. Denies diarrhea, abdominal pain, hematemesis or blood in stool.  URINARY: No flank pain, dysuria or hematuria.    Vitals:    09/29/20 1026   BP: 122/79   Pulse: 99   Temp: 98.6 °F (37 °C)   Weight: 100.2 kg (221 lb)   Height: 5' 3" (1.6 m)       Wt Readings from Last 3 Encounters:   09/29/20 100.2 kg (221 lb)   09/03/20 100.7 kg (222 lb)   08/06/20 100.7 kg (222 lb)       APPEARANCE: Well nourished, well developed, in no acute distress.    HEAD: Normocephalic.  Atraumatic.  EYES:   Right eye: Pupil reactive.  Conjunctiva clear.    Left eye: Pupil reactive.  Conjunctiva clear.    NECK: Supple.   MENTAL STATUS: Alert.  Oriented x 3.  "

## 2020-10-25 ENCOUNTER — PATIENT MESSAGE (OUTPATIENT)
Dept: FAMILY MEDICINE | Facility: CLINIC | Age: 25
End: 2020-10-25

## 2020-10-29 ENCOUNTER — OFFICE VISIT (OUTPATIENT)
Dept: FAMILY MEDICINE | Facility: CLINIC | Age: 25
End: 2020-10-29
Payer: MEDICAID

## 2020-10-29 VITALS
DIASTOLIC BLOOD PRESSURE: 87 MMHG | HEART RATE: 99 BPM | TEMPERATURE: 98 F | WEIGHT: 222.19 LBS | HEIGHT: 63 IN | BODY MASS INDEX: 39.37 KG/M2 | SYSTOLIC BLOOD PRESSURE: 135 MMHG

## 2020-10-29 DIAGNOSIS — M54.50 ACUTE MIDLINE LOW BACK PAIN WITHOUT SCIATICA: Primary | ICD-10-CM

## 2020-10-29 DIAGNOSIS — F40.01 AGORAPHOBIA WITH PANIC ATTACKS: ICD-10-CM

## 2020-10-29 DIAGNOSIS — F41.9 ANXIETY: ICD-10-CM

## 2020-10-29 DIAGNOSIS — L02.31 ABSCESS OF BUTTOCK: ICD-10-CM

## 2020-10-29 PROCEDURE — 99214 OFFICE O/P EST MOD 30 MIN: CPT | Mod: S$PBB,,, | Performed by: FAMILY MEDICINE

## 2020-10-29 PROCEDURE — 99999 PR PBB SHADOW E&M-EST. PATIENT-LVL III: ICD-10-PCS | Mod: PBBFAC,,, | Performed by: FAMILY MEDICINE

## 2020-10-29 PROCEDURE — 99214 PR OFFICE/OUTPT VISIT, EST, LEVL IV, 30-39 MIN: ICD-10-PCS | Mod: S$PBB,,, | Performed by: FAMILY MEDICINE

## 2020-10-29 PROCEDURE — 99999 PR PBB SHADOW E&M-EST. PATIENT-LVL III: CPT | Mod: PBBFAC,,, | Performed by: FAMILY MEDICINE

## 2020-10-29 PROCEDURE — 99213 OFFICE O/P EST LOW 20 MIN: CPT | Mod: PBBFAC,PO | Performed by: FAMILY MEDICINE

## 2020-10-29 RX ORDER — CYCLOBENZAPRINE HCL 10 MG
10 TABLET ORAL 3 TIMES DAILY PRN
Qty: 30 TABLET | Refills: 0 | Status: SHIPPED | OUTPATIENT
Start: 2020-10-29 | End: 2020-11-08

## 2020-10-29 RX ORDER — SERTRALINE HYDROCHLORIDE 25 MG/1
TABLET, FILM COATED ORAL
Qty: 21 TABLET | Refills: 0 | Status: SHIPPED | OUTPATIENT
Start: 2020-10-29 | End: 2020-12-01 | Stop reason: ALTCHOICE

## 2020-10-29 RX ORDER — FLUCONAZOLE 150 MG/1
150 TABLET ORAL WEEKLY
Qty: 2 TABLET | Refills: 0 | Status: SHIPPED | OUTPATIENT
Start: 2020-10-29 | End: 2020-11-06

## 2020-10-29 RX ORDER — DOXYCYCLINE 100 MG/1
100 CAPSULE ORAL 2 TIMES DAILY
Qty: 20 CAPSULE | Refills: 0 | Status: SHIPPED | OUTPATIENT
Start: 2020-10-29 | End: 2020-11-08

## 2020-10-29 RX ORDER — ACETAMINOPHEN AND CODEINE PHOSPHATE 300; 30 MG/1; MG/1
1 TABLET ORAL EVERY 6 HOURS PRN
COMMUNITY
End: 2020-12-01 | Stop reason: ALTCHOICE

## 2020-10-29 RX ORDER — VENLAFAXINE HYDROCHLORIDE 37.5 MG/1
CAPSULE, EXTENDED RELEASE ORAL
Qty: 30 CAPSULE | Refills: 0 | Status: SHIPPED | OUTPATIENT
Start: 2020-10-29 | End: 2020-12-01 | Stop reason: ALTCHOICE

## 2020-10-29 RX ORDER — SERTRALINE HYDROCHLORIDE 100 MG/1
100 TABLET, FILM COATED ORAL DAILY
Qty: 30 TABLET | Refills: 1 | Status: SHIPPED | OUTPATIENT
Start: 2020-10-29 | End: 2020-12-01 | Stop reason: SDUPTHER

## 2020-10-29 RX ORDER — KETOROLAC TROMETHAMINE 30 MG/ML
60 INJECTION, SOLUTION INTRAMUSCULAR; INTRAVENOUS ONCE
Status: COMPLETED | OUTPATIENT
Start: 2020-10-29 | End: 2020-10-29

## 2020-10-29 RX ADMIN — KETOROLAC TROMETHAMINE 60 MG: 60 INJECTION, SOLUTION INTRAMUSCULAR at 11:10

## 2020-10-29 NOTE — PROGRESS NOTES
Patient complains of lower back pain. Located on the midline lower back _. Symptoms started 1 week ago.  Noticed symptoms day after she was moving a mattress.  Seem to be improving, but she stumbled stepping over a baby gate and strained it again.  She did not fall.  _  no bowel or bladder complaints. Symptoms do _ radiate somewhat to the buttocks.    Her anxiety is not doing well with switch from Zoloft to Effexor.  She felt like the Zoloft was better and she wants to go back to it.  She also notes that she has developed a couple of small abscesses on the buttock.  One is already draining.  Both are small and she wanted to get antibiotics, but did decline exam on this today.  Previous MRSA.    Tomi was seen today for follow-up and back pain.    Diagnoses and all orders for this visit:    Acute midline low back pain without sciatica    Anxiety    Agoraphobia with panic attacks    Abscess of buttock    Other orders  -     sertraline (ZOLOFT) 25 MG tablet; Take 1 daily for 1 week, then take 2 daily for 1 week, then start 100 mg pills  -     venlafaxine (EFFEXOR-XR) 37.5 MG 24 hr capsule; Take 2 capsules (75 mg total) by mouth once daily for 7 days, THEN 1 capsule (37.5 mg total) once daily for 7 days. Then stop.  -     sertraline (ZOLOFT) 100 MG tablet; Take 1 tablet (100 mg total) by mouth once daily.  -     ketorolac injection 60 mg  -     cyclobenzaprine (FLEXERIL) 10 MG tablet; Take 1 tablet (10 mg total) by mouth 3 (three) times daily as needed for Muscle spasms.  -     doxycycline (MONODOX) 100 MG capsule; Take 1 capsule (100 mg total) by mouth 2 (two) times daily. for 10 days  -     fluconazole (DIFLUCAN) 150 MG Tab; Take 1 tablet (150 mg total) by mouth once a week. for 2 doses        Follow-up if symptoms worsen or not improving with above.  Follow-up 1 month.  Consider addition of BuSpar to the Zoloft if needed next visit.  Diflucan due to patient concern about possible yeast infection with the  antibiotics.  She will follow-up if the abscesses are not improving for exam and possible I&D.          Past Medical History:  Past Medical History:   Diagnosis Date    Agoraphobia with panic attacks 1/16/2018    Anxiety 1/16/2018    Fibromyalgia     GERD (gastroesophageal reflux disease)     Migraines     Obesity (BMI 35.0-39.9 without comorbidity)     Pyelonephritis 2011    Urticaria 2008    lasted 3 months resolved     Past Surgical History:   Procedure Laterality Date    TONSILLECTOMY       Review of patient's allergies indicates:   Allergen Reactions    Bactrim [sulfamethoxazole-trimethoprim] Rash     Current Outpatient Medications on File Prior to Visit   Medication Sig Dispense Refill    acetaminophen (TYLENOL) 325 MG tablet Take 325 mg by mouth every 6 (six) hours as needed for Pain.      acetaminophen-codeine 300-30mg (TYLENOL #3) 300-30 mg Tab Take 1 tablet by mouth every 6 (six) hours as needed.      ALPRAZolam (XANAX) 0.25 MG tablet Take 1 tablet (0.25 mg total) by mouth 2 (two) times daily as needed for Anxiety. 20 tablet 0    naproxen (NAPROSYN) 500 MG tablet TAKE 1 TABLET (500 MG TOTAL) BY MOUTH 2 (TWO) TIMES DAILY AS NEEDED (MIGRAINE). 60 tablet 5    omeprazole (PRILOSEC) 20 MG capsule Take 1 capsule (20 mg total) by mouth once daily. 90 capsule 3    TRI-LO-SPRINTEC 0.18/0.215/0.25 mg-25 mcg tablet TAKE 1 TABLET BY MOUTH EVERY DAY 84 tablet 0    [DISCONTINUED] venlafaxine (EFFEXOR-XR) 150 MG Cp24 Take 1 capsule (150 mg total) by mouth once daily. 30 capsule 1     No current facility-administered medications on file prior to visit.      Social History     Socioeconomic History    Marital status:      Spouse name: Not on file    Number of children: Not on file    Years of education: Not on file    Highest education level: Not on file   Occupational History    Not on file   Social Needs    Financial resource strain: Not very hard    Food insecurity     Worry: Never true     " Inability: Never true    Transportation needs     Medical: No     Non-medical: No   Tobacco Use    Smoking status: Former Smoker     Packs/day: 0.50     Start date: 6/3/2012     Quit date: 2019     Years since quittin.9   Substance and Sexual Activity    Alcohol use: Yes     Alcohol/week: 1.0 standard drinks     Types: 1 Glasses of wine per week     Frequency: 2-4 times a month     Drinks per session: 1 or 2     Binge frequency: Never    Drug use: No    Sexual activity: Yes     Partners: Male     Birth control/protection: Pill   Lifestyle    Physical activity     Days per week: 1 day     Minutes per session: 60 min    Stress: Very much   Relationships    Social connections     Talks on phone: Twice a week     Gets together: Never     Attends Zoroastrianism service: Not on file     Active member of club or organization: No     Attends meetings of clubs or organizations: Never     Relationship status:    Other Topics Concern    Not on file   Social History Narrative    Not on file     Family History   Problem Relation Age of Onset    Diabetes Mother     Mental illness Mother         gambling problem    Uterine cancer Paternal Grandmother     Cancer Paternal Grandmother     Mental illness Sister         borderline personality disorder       Vitals:    10/29/20 0953   BP: 135/87   Pulse: 99   Temp: 97.5 °F (36.4 °C)   Weight: 100.8 kg (222 lb 3.2 oz)   Height: 5' 3" (1.6 m)       Physical Exam:   general: No acute distress   Chest clear to auscultation. Normal respiratory effort   Heart: Regular rate and rhythm .   Abdomen: Positive bowel sounds soft nontender no hepato- splenomegaly.   Back: Decreased range of motion. Tender to palpation over the bilateral lower back _. No spinous tenderness. Negative straight leg raise. Normal gait. Deep tendon reflexes intact. Able to stand on heels and toes         "

## 2020-11-06 ENCOUNTER — PATIENT MESSAGE (OUTPATIENT)
Dept: FAMILY MEDICINE | Facility: CLINIC | Age: 25
End: 2020-11-06

## 2020-11-06 RX ORDER — NORGESTIMATE AND ETHINYL ESTRADIOL 7DAYSX3 LO
1 KIT ORAL DAILY
Qty: 84 TABLET | Refills: 2 | Status: SHIPPED | OUTPATIENT
Start: 2020-11-06 | End: 2021-04-26

## 2020-12-01 ENCOUNTER — OFFICE VISIT (OUTPATIENT)
Dept: FAMILY MEDICINE | Facility: CLINIC | Age: 25
End: 2020-12-01
Payer: MEDICAID

## 2020-12-01 DIAGNOSIS — F41.9 ANXIETY: Primary | ICD-10-CM

## 2020-12-01 DIAGNOSIS — F32.A DEPRESSION, UNSPECIFIED DEPRESSION TYPE: ICD-10-CM

## 2020-12-01 DIAGNOSIS — M54.50 ACUTE LOW BACK PAIN DUE TO TRAUMA: ICD-10-CM

## 2020-12-01 DIAGNOSIS — F40.01 AGORAPHOBIA WITH PANIC ATTACKS: ICD-10-CM

## 2020-12-01 DIAGNOSIS — G89.11 ACUTE LOW BACK PAIN DUE TO TRAUMA: ICD-10-CM

## 2020-12-01 PROCEDURE — 99213 PR OFFICE/OUTPT VISIT, EST, LEVL III, 20-29 MIN: ICD-10-PCS | Mod: 95,,, | Performed by: FAMILY MEDICINE

## 2020-12-01 PROCEDURE — 99213 OFFICE O/P EST LOW 20 MIN: CPT | Mod: 95,,, | Performed by: FAMILY MEDICINE

## 2020-12-01 RX ORDER — SERTRALINE HYDROCHLORIDE 100 MG/1
100 TABLET, FILM COATED ORAL DAILY
Qty: 90 TABLET | Refills: 3 | Status: SHIPPED | OUTPATIENT
Start: 2020-12-01 | End: 2020-12-02

## 2020-12-01 NOTE — PROGRESS NOTES
Primary Care Telemedicine Note    The patient location is:  [place]  The chief complaint leading to consultation is: per below note  Total time spent with patient:  15 min      Visit type: Virtual visit with synchronous audio and video  Each patient to whom he or she provides medical services by telemedicine is:  (1) informed of the relationship between the physician and patient and the respective role of any other health care provider with respect to management of the patient; and (2) notified that he or she may decline to receive medical services by telemedicine and may withdraw from such care at any time.    Presents follow-up anxiety depression.  She is doing better with going back to Zoloft.  She feels like she is doing well with current dosing would and wants to continue with current treatment.  She denies SI/HI.    She also complains of some lower back pain center and to the left side.  About 6 weeks ago she had a fall when she slipped in fuzzy socks.  She strained her back again stepping over a baby gate.  Apparently she tripped over something once more and landed on her tailbone.  She denies symptoms radiating down the leg, though seems to go towards the hip area.  No focal weakness numbness paresthesias.  No urinary or bowel complaints.  Current treatment Flexeril at night which she is discontinuing now as well as naproxen.    Diagnoses and all orders for this visit:    Anxiety    Agoraphobia with panic attacks    Acute low back pain due to trauma  -     X-Ray Lumbar Spine Complete 5 View; Future  -     Ambulatory referral/consult to Physical/Occupational Therapy; Future    Depression, unspecified depression type    Other orders  -     sertraline (ZOLOFT) 100 MG tablet; Take 1 tablet (100 mg total) by mouth once daily.     continue Zoloft.  X-ray as above.  Refer to physical therapy.  Follow-up if symptoms not resolving with this.           Past Medical History:  Past Medical History:   Diagnosis Date     Agoraphobia with panic attacks 2018    Anxiety 2018    Fibromyalgia     GERD (gastroesophageal reflux disease)     Migraines     Obesity (BMI 35.0-39.9 without comorbidity)     Pyelonephritis 2011    Urticaria 2008    lasted 3 months resolved     Past Surgical History:   Procedure Laterality Date    TONSILLECTOMY       Social History     Socioeconomic History    Marital status:      Spouse name: Not on file    Number of children: Not on file    Years of education: Not on file    Highest education level: Not on file   Occupational History    Not on file   Social Needs    Financial resource strain: Not very hard    Food insecurity     Worry: Never true     Inability: Never true    Transportation needs     Medical: No     Non-medical: No   Tobacco Use    Smoking status: Former Smoker     Packs/day: 0.50     Start date: 6/3/2012     Quit date: 2019     Years since quittin.0   Substance and Sexual Activity    Alcohol use: Yes     Alcohol/week: 1.0 standard drinks     Types: 1 Glasses of wine per week     Frequency: 2-4 times a month     Drinks per session: 1 or 2     Binge frequency: Never    Drug use: No    Sexual activity: Yes     Partners: Male     Birth control/protection: Pill   Lifestyle    Physical activity     Days per week: 1 day     Minutes per session: 60 min    Stress: Very much   Relationships    Social connections     Talks on phone: Twice a week     Gets together: Never     Attends Mandaeism service: Not on file     Active member of club or organization: No     Attends meetings of clubs or organizations: Never     Relationship status:    Other Topics Concern    Not on file   Social History Narrative    Not on file     Family History   Problem Relation Age of Onset    Diabetes Mother     Mental illness Mother         gambling problem    Uterine cancer Paternal Grandmother     Cancer Paternal Grandmother     Mental illness Sister          borderline personality disorder     Review of patient's allergies indicates:   Allergen Reactions    Bactrim [sulfamethoxazole-trimethoprim] Rash     Current Outpatient Medications on File Prior to Visit   Medication Sig Dispense Refill    ALPRAZolam (XANAX) 0.25 MG tablet Take 1 tablet (0.25 mg total) by mouth 2 (two) times daily as needed for Anxiety. 20 tablet 0    naproxen (NAPROSYN) 500 MG tablet TAKE 1 TABLET (500 MG TOTAL) BY MOUTH 2 (TWO) TIMES DAILY AS NEEDED (MIGRAINE). 60 tablet 5    norgestimate-ethinyl estradioL (TRI-LO-SPRINTEC) 0.18/0.215/0.25 mg-25 mcg tablet Take 1 tablet by mouth once daily. 84 tablet 2    omeprazole (PRILOSEC) 20 MG capsule Take 1 capsule (20 mg total) by mouth once daily. 90 capsule 3    [DISCONTINUED] acetaminophen (TYLENOL) 325 MG tablet Take 325 mg by mouth every 6 (six) hours as needed for Pain.      [DISCONTINUED] acetaminophen-codeine 300-30mg (TYLENOL #3) 300-30 mg Tab Take 1 tablet by mouth every 6 (six) hours as needed.      [DISCONTINUED] sertraline (ZOLOFT) 100 MG tablet Take 1 tablet (100 mg total) by mouth once daily. 30 tablet 1    [DISCONTINUED] sertraline (ZOLOFT) 25 MG tablet Take 1 daily for 1 week, then take 2 daily for 1 week, then start 100 mg pills 21 tablet 0    [DISCONTINUED] venlafaxine (EFFEXOR-XR) 37.5 MG 24 hr capsule Take 2 capsules (75 mg total) by mouth once daily for 7 days, THEN 1 capsule (37.5 mg total) once daily for 7 days. Then stop. 30 capsule 0     No current facility-administered medications on file prior to visit.            Answers for HPI/ROS submitted by the patient on 12/1/2020   activity change: No  unexpected weight change: No  neck pain: No  hearing loss: No  rhinorrhea: No  trouble swallowing: No  eye discharge: No  visual disturbance: No  chest tightness: No  wheezing: No  chest pain: No  palpitations: No  blood in stool: No  constipation: No  vomiting: No  diarrhea: No  polydipsia: No  polyuria: No  difficulty  urinating: No  hematuria: No  menstrual problem: No  dysuria: No  joint swelling: No  arthralgias: No  headaches: No  weakness: No  confusion: No  dysphoric mood: No    There were no vitals filed for this visit.    Wt Readings from Last 3 Encounters:   10/29/20 100.8 kg (222 lb 3.2 oz)   09/29/20 100.2 kg (221 lb)   09/03/20 100.7 kg (222 lb)       APPEARANCE: Well nourished, well developed, in no acute distress.    MENTAL STATUS: Alert.  Oriented x 3.  Head atraumatic normocephalic no obvious sinus swelling  Eyes extraocular movements intact.  No obvious conjunctivitis  Neck supple  Chest no respiratory distress noted.  No accessory muscle use.  Back shows some decreased range of motion with flexion and extension.

## 2020-12-02 ENCOUNTER — HOSPITAL ENCOUNTER (OUTPATIENT)
Dept: RADIOLOGY | Facility: HOSPITAL | Age: 25
Discharge: HOME | End: 2020-12-02
Attending: FAMILY MEDICINE
Payer: MEDICAID

## 2020-12-02 DIAGNOSIS — M54.50 ACUTE LOW BACK PAIN DUE TO TRAUMA: ICD-10-CM

## 2020-12-02 DIAGNOSIS — G89.11 ACUTE LOW BACK PAIN DUE TO TRAUMA: ICD-10-CM

## 2020-12-02 PROCEDURE — 72110 XR LUMBAR SPINE COMPLETE 5 VIEW: ICD-10-PCS | Mod: 26,,, | Performed by: RADIOLOGY

## 2020-12-02 PROCEDURE — 72110 X-RAY EXAM L-2 SPINE 4/>VWS: CPT | Mod: TC,PO

## 2020-12-02 PROCEDURE — 72110 X-RAY EXAM L-2 SPINE 4/>VWS: CPT | Mod: 26,,, | Performed by: RADIOLOGY

## 2020-12-02 RX ORDER — SERTRALINE HYDROCHLORIDE 100 MG/1
100 TABLET, FILM COATED ORAL DAILY
Qty: 90 TABLET | Refills: 3 | Status: SHIPPED | OUTPATIENT
Start: 2020-12-02 | End: 2022-02-28

## 2021-02-04 ENCOUNTER — OFFICE VISIT (OUTPATIENT)
Dept: FAMILY MEDICINE | Facility: CLINIC | Age: 26
End: 2021-02-04
Payer: MEDICAID

## 2021-02-04 DIAGNOSIS — G89.29 CHRONIC BILATERAL LOW BACK PAIN WITH BILATERAL SCIATICA: Primary | ICD-10-CM

## 2021-02-04 DIAGNOSIS — M54.42 CHRONIC BILATERAL LOW BACK PAIN WITH BILATERAL SCIATICA: Primary | ICD-10-CM

## 2021-02-04 DIAGNOSIS — M54.41 CHRONIC BILATERAL LOW BACK PAIN WITH BILATERAL SCIATICA: Primary | ICD-10-CM

## 2021-02-04 PROCEDURE — 99213 PR OFFICE/OUTPT VISIT, EST, LEVL III, 20-29 MIN: ICD-10-PCS | Mod: 95,,, | Performed by: FAMILY MEDICINE

## 2021-02-04 PROCEDURE — 99213 OFFICE O/P EST LOW 20 MIN: CPT | Mod: 95,,, | Performed by: FAMILY MEDICINE

## 2021-02-04 RX ORDER — TRAMADOL HYDROCHLORIDE 50 MG/1
50 TABLET ORAL EVERY 6 HOURS PRN
Qty: 20 TABLET | Refills: 0 | Status: SHIPPED | OUTPATIENT
Start: 2021-02-04 | End: 2021-02-14

## 2021-02-04 RX ORDER — NAPROXEN 500 MG/1
500 TABLET ORAL 2 TIMES DAILY PRN
Qty: 60 TABLET | Refills: 5 | Status: SHIPPED | OUTPATIENT
Start: 2021-02-04

## 2021-02-04 RX ORDER — METHYLPREDNISOLONE 4 MG/1
TABLET ORAL
Qty: 21 TABLET | Refills: 0 | Status: SHIPPED | OUTPATIENT
Start: 2021-02-04 | End: 2022-02-28

## 2021-02-04 RX ORDER — ACETAMINOPHEN 500 MG
1000 TABLET ORAL EVERY 6 HOURS PRN
COMMUNITY
Start: 2021-02-04 | End: 2022-02-28

## 2021-02-12 ENCOUNTER — CLINICAL SUPPORT (OUTPATIENT)
Dept: REHABILITATION | Facility: HOSPITAL | Age: 26
End: 2021-02-12
Attending: FAMILY MEDICINE
Payer: MEDICAID

## 2021-02-12 DIAGNOSIS — M53.86 DECREASED ROM OF LUMBAR SPINE: ICD-10-CM

## 2021-02-12 DIAGNOSIS — R29.898 IMPAIRED FLEXIBILITY OF LOWER EXTREMITY: ICD-10-CM

## 2021-02-12 DIAGNOSIS — R29.898 DECREASED STRENGTH OF LOWER EXTREMITY: ICD-10-CM

## 2021-02-12 DIAGNOSIS — R68.89 DECREASED FUNCTIONAL ACTIVITY TOLERANCE: ICD-10-CM

## 2021-02-12 PROCEDURE — 97161 PT EVAL LOW COMPLEX 20 MIN: CPT | Mod: PN

## 2021-02-12 PROCEDURE — 97110 THERAPEUTIC EXERCISES: CPT | Mod: PN

## 2021-02-14 ENCOUNTER — DOCUMENTATION ONLY (OUTPATIENT)
Dept: REHABILITATION | Facility: HOSPITAL | Age: 26
End: 2021-02-14

## 2021-02-19 ENCOUNTER — CLINICAL SUPPORT (OUTPATIENT)
Dept: REHABILITATION | Facility: HOSPITAL | Age: 26
End: 2021-02-19
Payer: MEDICAID

## 2021-02-19 DIAGNOSIS — M53.86 DECREASED ROM OF LUMBAR SPINE: ICD-10-CM

## 2021-02-19 DIAGNOSIS — R29.898 IMPAIRED FLEXIBILITY OF LOWER EXTREMITY: ICD-10-CM

## 2021-02-19 DIAGNOSIS — R68.89 DECREASED FUNCTIONAL ACTIVITY TOLERANCE: ICD-10-CM

## 2021-02-19 DIAGNOSIS — R29.898 DECREASED STRENGTH OF LOWER EXTREMITY: ICD-10-CM

## 2021-02-19 PROCEDURE — 97110 THERAPEUTIC EXERCISES: CPT | Mod: PN,CQ

## 2021-03-05 ENCOUNTER — CLINICAL SUPPORT (OUTPATIENT)
Dept: REHABILITATION | Facility: HOSPITAL | Age: 26
End: 2021-03-05
Payer: MEDICAID

## 2021-03-05 DIAGNOSIS — M53.86 DECREASED ROM OF LUMBAR SPINE: ICD-10-CM

## 2021-03-05 DIAGNOSIS — R29.898 IMPAIRED FLEXIBILITY OF LOWER EXTREMITY: ICD-10-CM

## 2021-03-05 DIAGNOSIS — R68.89 DECREASED FUNCTIONAL ACTIVITY TOLERANCE: ICD-10-CM

## 2021-03-05 DIAGNOSIS — R29.898 DECREASED STRENGTH OF LOWER EXTREMITY: ICD-10-CM

## 2021-03-05 PROCEDURE — 97110 THERAPEUTIC EXERCISES: CPT | Mod: PN,CQ

## 2021-03-12 ENCOUNTER — CLINICAL SUPPORT (OUTPATIENT)
Dept: REHABILITATION | Facility: HOSPITAL | Age: 26
End: 2021-03-12
Payer: MEDICAID

## 2021-03-12 DIAGNOSIS — R29.898 IMPAIRED FLEXIBILITY OF LOWER EXTREMITY: ICD-10-CM

## 2021-03-12 DIAGNOSIS — M53.86 DECREASED ROM OF LUMBAR SPINE: ICD-10-CM

## 2021-03-12 DIAGNOSIS — R68.89 DECREASED FUNCTIONAL ACTIVITY TOLERANCE: ICD-10-CM

## 2021-03-12 DIAGNOSIS — R29.898 DECREASED STRENGTH OF LOWER EXTREMITY: ICD-10-CM

## 2021-03-12 PROCEDURE — 97110 THERAPEUTIC EXERCISES: CPT | Mod: PN

## 2021-04-21 ENCOUNTER — DOCUMENTATION ONLY (OUTPATIENT)
Dept: REHABILITATION | Facility: HOSPITAL | Age: 26
End: 2021-04-21

## 2021-04-21 PROBLEM — R68.89 DECREASED FUNCTIONAL ACTIVITY TOLERANCE: Status: RESOLVED | Noted: 2021-02-12 | Resolved: 2021-04-21

## 2021-04-21 PROBLEM — M53.86 DECREASED ROM OF LUMBAR SPINE: Status: RESOLVED | Noted: 2021-02-12 | Resolved: 2021-04-21

## 2021-04-21 PROBLEM — R29.898 DECREASED STRENGTH OF LOWER EXTREMITY: Status: RESOLVED | Noted: 2021-02-12 | Resolved: 2021-04-21

## 2021-04-21 PROBLEM — R29.898 IMPAIRED FLEXIBILITY OF LOWER EXTREMITY: Status: RESOLVED | Noted: 2021-02-12 | Resolved: 2021-04-21

## 2021-04-26 RX ORDER — NORGESTIMATE AND ETHINYL ESTRADIOL 7DAYSX3 LO
KIT ORAL
Qty: 84 TABLET | Refills: 0 | Status: SHIPPED | OUTPATIENT
Start: 2021-04-26 | End: 2022-02-28

## 2021-04-27 ENCOUNTER — PATIENT MESSAGE (OUTPATIENT)
Dept: FAMILY MEDICINE | Facility: CLINIC | Age: 26
End: 2021-04-27

## 2021-06-01 ENCOUNTER — PATIENT MESSAGE (OUTPATIENT)
Dept: FAMILY MEDICINE | Facility: CLINIC | Age: 26
End: 2021-06-01

## 2021-06-01 ENCOUNTER — LAB VISIT (OUTPATIENT)
Dept: LAB | Facility: HOSPITAL | Age: 26
End: 2021-06-01
Attending: FAMILY MEDICINE
Payer: COMMERCIAL

## 2021-06-01 DIAGNOSIS — N92.1 MENORRHAGIA WITH IRREGULAR CYCLE: Primary | ICD-10-CM

## 2021-06-01 DIAGNOSIS — N92.1 MENORRHAGIA WITH IRREGULAR CYCLE: ICD-10-CM

## 2021-06-01 LAB
BASOPHILS # BLD AUTO: 0.07 K/UL (ref 0–0.2)
BASOPHILS NFR BLD: 0.7 % (ref 0–1.9)
DIFFERENTIAL METHOD: ABNORMAL
EOSINOPHIL # BLD AUTO: 1 K/UL (ref 0–0.5)
EOSINOPHIL NFR BLD: 10.1 % (ref 0–8)
ERYTHROCYTE [DISTWIDTH] IN BLOOD BY AUTOMATED COUNT: 13.8 % (ref 11.5–14.5)
HCT VFR BLD AUTO: 40.5 % (ref 37–48.5)
HGB BLD-MCNC: 12.7 G/DL (ref 12–16)
IMM GRANULOCYTES # BLD AUTO: 0.05 K/UL (ref 0–0.04)
IMM GRANULOCYTES NFR BLD AUTO: 0.5 % (ref 0–0.5)
LYMPHOCYTES # BLD AUTO: 2.4 K/UL (ref 1–4.8)
LYMPHOCYTES NFR BLD: 24.4 % (ref 18–48)
MCH RBC QN AUTO: 27.1 PG (ref 27–31)
MCHC RBC AUTO-ENTMCNC: 31.4 G/DL (ref 32–36)
MCV RBC AUTO: 87 FL (ref 82–98)
MONOCYTES # BLD AUTO: 0.6 K/UL (ref 0.3–1)
MONOCYTES NFR BLD: 6.4 % (ref 4–15)
NEUTROPHILS # BLD AUTO: 5.7 K/UL (ref 1.8–7.7)
NEUTROPHILS NFR BLD: 57.9 % (ref 38–73)
NRBC BLD-RTO: 0 /100 WBC
PLATELET # BLD AUTO: 309 K/UL (ref 150–450)
PMV BLD AUTO: 10.6 FL (ref 9.2–12.9)
RBC # BLD AUTO: 4.68 M/UL (ref 4–5.4)
WBC # BLD AUTO: 9.76 K/UL (ref 3.9–12.7)

## 2021-06-01 PROCEDURE — 85025 COMPLETE CBC W/AUTO DIFF WBC: CPT | Performed by: FAMILY MEDICINE

## 2021-06-01 PROCEDURE — 36415 COLL VENOUS BLD VENIPUNCTURE: CPT | Mod: PO | Performed by: FAMILY MEDICINE

## 2021-06-25 ENCOUNTER — OFFICE VISIT (OUTPATIENT)
Dept: FAMILY MEDICINE | Facility: CLINIC | Age: 26
End: 2021-06-25
Payer: COMMERCIAL

## 2021-06-25 VITALS
OXYGEN SATURATION: 100 % | TEMPERATURE: 99 F | HEART RATE: 84 BPM | DIASTOLIC BLOOD PRESSURE: 86 MMHG | HEIGHT: 63 IN | SYSTOLIC BLOOD PRESSURE: 124 MMHG | BODY MASS INDEX: 39.8 KG/M2 | WEIGHT: 224.63 LBS

## 2021-06-25 DIAGNOSIS — Z01.419 WELL WOMAN EXAM WITH ROUTINE GYNECOLOGICAL EXAM: Primary | ICD-10-CM

## 2021-06-25 DIAGNOSIS — L98.9 SKIN LESION: ICD-10-CM

## 2021-06-25 DIAGNOSIS — N92.6 ABNORMAL MENSES: ICD-10-CM

## 2021-06-25 PROCEDURE — 99395 PR PREVENTIVE VISIT,EST,18-39: ICD-10-PCS | Mod: S$GLB,,, | Performed by: NURSE PRACTITIONER

## 2021-06-25 PROCEDURE — 99395 PREV VISIT EST AGE 18-39: CPT | Mod: S$GLB,,, | Performed by: NURSE PRACTITIONER

## 2021-06-25 PROCEDURE — 99999 PR PBB SHADOW E&M-EST. PATIENT-LVL V: CPT | Mod: PBBFAC,,, | Performed by: NURSE PRACTITIONER

## 2021-06-25 PROCEDURE — 1126F PR PAIN SEVERITY QUANTIFIED, NO PAIN PRESENT: ICD-10-PCS | Mod: S$GLB,,, | Performed by: NURSE PRACTITIONER

## 2021-06-25 PROCEDURE — 3008F BODY MASS INDEX DOCD: CPT | Mod: CPTII,S$GLB,, | Performed by: NURSE PRACTITIONER

## 2021-06-25 PROCEDURE — 87624 HPV HI-RISK TYP POOLED RSLT: CPT | Performed by: NURSE PRACTITIONER

## 2021-06-25 PROCEDURE — 3008F PR BODY MASS INDEX (BMI) DOCUMENTED: ICD-10-PCS | Mod: CPTII,S$GLB,, | Performed by: NURSE PRACTITIONER

## 2021-06-25 PROCEDURE — 99999 PR PBB SHADOW E&M-EST. PATIENT-LVL V: ICD-10-PCS | Mod: PBBFAC,,, | Performed by: NURSE PRACTITIONER

## 2021-06-25 PROCEDURE — 88175 CYTOPATH C/V AUTO FLUID REDO: CPT | Performed by: NURSE PRACTITIONER

## 2021-06-25 PROCEDURE — 1126F AMNT PAIN NOTED NONE PRSNT: CPT | Mod: S$GLB,,, | Performed by: NURSE PRACTITIONER

## 2021-06-25 RX ORDER — MUPIROCIN 20 MG/G
1 OINTMENT TOPICAL 3 TIMES DAILY
COMMUNITY
End: 2022-02-28

## 2021-06-25 RX ORDER — CYCLOBENZAPRINE HCL 10 MG
TABLET ORAL
COMMUNITY
End: 2022-02-28

## 2021-07-01 LAB
CLINICAL INFO: NORMAL
CYTO CVX: NORMAL
CYTOLOGIST CVX/VAG CYTO: NORMAL
CYTOLOGY CMNT CVX/VAG CYTO-IMP: NORMAL
CYTOLOGY PAP THIN PREP EXPLANATION: NORMAL
DATE OF PREVIOUS PAP: NO
DATE PREVIOUS BX: NO
HPV I/H RISK 4 DNA CVX QL NAA+PROBE: NOT DETECTED
LMP START DATE: NORMAL
SPECIMEN SOURCE CVX/VAG CYTO: NORMAL
STAT OF ADQ CVX/VAG CYTO-IMP: NORMAL

## 2021-07-23 DIAGNOSIS — K21.9 GASTRO-ESOPHAGEAL REFLUX DISEASE WITHOUT ESOPHAGITIS: ICD-10-CM

## 2021-07-23 RX ORDER — OMEPRAZOLE 20 MG/1
20 CAPSULE, DELAYED RELEASE ORAL DAILY
Qty: 90 CAPSULE | Refills: 1 | Status: SHIPPED | OUTPATIENT
Start: 2021-07-23 | End: 2022-01-19

## 2022-02-28 ENCOUNTER — OFFICE VISIT (OUTPATIENT)
Dept: FAMILY MEDICINE | Facility: CLINIC | Age: 27
End: 2022-02-28
Payer: MEDICAID

## 2022-02-28 ENCOUNTER — PATIENT MESSAGE (OUTPATIENT)
Dept: FAMILY MEDICINE | Facility: CLINIC | Age: 27
End: 2022-02-28

## 2022-02-28 VITALS
SYSTOLIC BLOOD PRESSURE: 126 MMHG | TEMPERATURE: 98 F | WEIGHT: 218.88 LBS | BODY MASS INDEX: 38.78 KG/M2 | HEART RATE: 92 BPM | HEIGHT: 63 IN | OXYGEN SATURATION: 99 % | DIASTOLIC BLOOD PRESSURE: 82 MMHG

## 2022-02-28 DIAGNOSIS — N92.6 LATE MENSES: ICD-10-CM

## 2022-02-28 DIAGNOSIS — M54.6 ACUTE BILATERAL THORACIC BACK PAIN: Primary | ICD-10-CM

## 2022-02-28 DIAGNOSIS — Z30.09 UNWANTED FERTILITY: ICD-10-CM

## 2022-02-28 LAB — B-HCG UR QL: NEGATIVE

## 2022-02-28 PROCEDURE — 3079F DIAST BP 80-89 MM HG: CPT | Mod: CPTII,,, | Performed by: FAMILY MEDICINE

## 2022-02-28 PROCEDURE — 99999 PR PBB SHADOW E&M-EST. PATIENT-LVL III: CPT | Mod: PBBFAC,,, | Performed by: FAMILY MEDICINE

## 2022-02-28 PROCEDURE — 3008F PR BODY MASS INDEX (BMI) DOCUMENTED: ICD-10-PCS | Mod: CPTII,,, | Performed by: FAMILY MEDICINE

## 2022-02-28 PROCEDURE — 1159F MED LIST DOCD IN RCRD: CPT | Mod: CPTII,,, | Performed by: FAMILY MEDICINE

## 2022-02-28 PROCEDURE — 99999 PR PBB SHADOW E&M-EST. PATIENT-LVL III: ICD-10-PCS | Mod: PBBFAC,,, | Performed by: FAMILY MEDICINE

## 2022-02-28 PROCEDURE — 3074F PR MOST RECENT SYSTOLIC BLOOD PRESSURE < 130 MM HG: ICD-10-PCS | Mod: CPTII,,, | Performed by: FAMILY MEDICINE

## 2022-02-28 PROCEDURE — 1159F PR MEDICATION LIST DOCUMENTED IN MEDICAL RECORD: ICD-10-PCS | Mod: CPTII,,, | Performed by: FAMILY MEDICINE

## 2022-02-28 PROCEDURE — 3079F PR MOST RECENT DIASTOLIC BLOOD PRESSURE 80-89 MM HG: ICD-10-PCS | Mod: CPTII,,, | Performed by: FAMILY MEDICINE

## 2022-02-28 PROCEDURE — 81025 URINE PREGNANCY TEST: CPT | Performed by: FAMILY MEDICINE

## 2022-02-28 PROCEDURE — 99213 PR OFFICE/OUTPT VISIT, EST, LEVL III, 20-29 MIN: ICD-10-PCS | Mod: S$PBB,,, | Performed by: FAMILY MEDICINE

## 2022-02-28 PROCEDURE — 3074F SYST BP LT 130 MM HG: CPT | Mod: CPTII,,, | Performed by: FAMILY MEDICINE

## 2022-02-28 PROCEDURE — 99213 OFFICE O/P EST LOW 20 MIN: CPT | Mod: PBBFAC,PO | Performed by: FAMILY MEDICINE

## 2022-02-28 PROCEDURE — 3008F BODY MASS INDEX DOCD: CPT | Mod: CPTII,,, | Performed by: FAMILY MEDICINE

## 2022-02-28 PROCEDURE — 99213 OFFICE O/P EST LOW 20 MIN: CPT | Mod: S$PBB,,, | Performed by: FAMILY MEDICINE

## 2022-03-14 ENCOUNTER — PATIENT OUTREACH (OUTPATIENT)
Dept: ADMINISTRATIVE | Facility: OTHER | Age: 27
End: 2022-03-14
Payer: MEDICAID

## 2022-03-14 NOTE — PROGRESS NOTES
Health Maintenance Due   Topic Date Due    HPV Vaccines (2 - 3-dose series) 06/21/2018    Influenza Vaccine (1) 09/01/2021    COVID-19 Vaccine (3 - Booster for Moderna series) 09/28/2021     Updates were requested from care everywhere.  Chart was reviewed for overdue Proactive [facility] Encounters ([facility]) topics (CRS, Breast Cancer Screening, Eye exam)  Health Maintenance has been updated.  [facility] immunization registry triggered.  Immunizations were reconciled.

## 2022-03-16 ENCOUNTER — OFFICE VISIT (OUTPATIENT)
Dept: OBSTETRICS AND GYNECOLOGY | Facility: CLINIC | Age: 27
End: 2022-03-16
Payer: MEDICAID

## 2022-03-16 ENCOUNTER — HOSPITAL ENCOUNTER (OUTPATIENT)
Dept: RADIOLOGY | Facility: HOSPITAL | Age: 27
Discharge: HOME | End: 2022-03-16
Attending: SPECIALIST
Payer: MEDICAID

## 2022-03-16 VITALS
BODY MASS INDEX: 38.55 KG/M2 | DIASTOLIC BLOOD PRESSURE: 82 MMHG | WEIGHT: 217.63 LBS | SYSTOLIC BLOOD PRESSURE: 112 MMHG

## 2022-03-16 DIAGNOSIS — N92.6 LATE MENSES: ICD-10-CM

## 2022-03-16 DIAGNOSIS — E28.2 PCOS (POLYCYSTIC OVARIAN SYNDROME): Primary | ICD-10-CM

## 2022-03-16 DIAGNOSIS — Z30.09 UNWANTED FERTILITY: ICD-10-CM

## 2022-03-16 DIAGNOSIS — E28.2 PCOS (POLYCYSTIC OVARIAN SYNDROME): ICD-10-CM

## 2022-03-16 LAB
B-HCG UR QL: NEGATIVE
CTP QC/QA: YES

## 2022-03-16 PROCEDURE — 99213 OFFICE O/P EST LOW 20 MIN: CPT | Mod: PBBFAC,PN,25 | Performed by: SPECIALIST

## 2022-03-16 PROCEDURE — 1159F PR MEDICATION LIST DOCUMENTED IN MEDICAL RECORD: ICD-10-PCS | Mod: CPTII,,, | Performed by: SPECIALIST

## 2022-03-16 PROCEDURE — 1159F MED LIST DOCD IN RCRD: CPT | Mod: CPTII,,, | Performed by: SPECIALIST

## 2022-03-16 PROCEDURE — 76830 TRANSVAGINAL US NON-OB: CPT | Mod: TC,PO

## 2022-03-16 PROCEDURE — 99999 PR PBB SHADOW E&M-EST. PATIENT-LVL III: ICD-10-PCS | Mod: PBBFAC,,, | Performed by: SPECIALIST

## 2022-03-16 PROCEDURE — 99204 OFFICE O/P NEW MOD 45 MIN: CPT | Mod: S$PBB,,, | Performed by: SPECIALIST

## 2022-03-16 PROCEDURE — 76830 TRANSVAGINAL US NON-OB: CPT | Mod: 26,,, | Performed by: RADIOLOGY

## 2022-03-16 PROCEDURE — 3008F BODY MASS INDEX DOCD: CPT | Mod: CPTII,,, | Performed by: SPECIALIST

## 2022-03-16 PROCEDURE — 3079F DIAST BP 80-89 MM HG: CPT | Mod: CPTII,,, | Performed by: SPECIALIST

## 2022-03-16 PROCEDURE — 81025 URINE PREGNANCY TEST: CPT | Mod: PBBFAC,PN | Performed by: SPECIALIST

## 2022-03-16 PROCEDURE — 99999 PR PBB SHADOW E&M-EST. PATIENT-LVL III: CPT | Mod: PBBFAC,,, | Performed by: SPECIALIST

## 2022-03-16 PROCEDURE — 76830 US PELVIS COMP WITH TRANSVAG NON-OB (XPD): ICD-10-PCS | Mod: 26,,, | Performed by: RADIOLOGY

## 2022-03-16 PROCEDURE — 3079F PR MOST RECENT DIASTOLIC BLOOD PRESSURE 80-89 MM HG: ICD-10-PCS | Mod: CPTII,,, | Performed by: SPECIALIST

## 2022-03-16 PROCEDURE — 76856 US PELVIS COMP WITH TRANSVAG NON-OB (XPD): ICD-10-PCS | Mod: 26,,, | Performed by: RADIOLOGY

## 2022-03-16 PROCEDURE — 99204 PR OFFICE/OUTPT VISIT, NEW, LEVL IV, 45-59 MIN: ICD-10-PCS | Mod: S$PBB,,, | Performed by: SPECIALIST

## 2022-03-16 PROCEDURE — 3074F PR MOST RECENT SYSTOLIC BLOOD PRESSURE < 130 MM HG: ICD-10-PCS | Mod: CPTII,,, | Performed by: SPECIALIST

## 2022-03-16 PROCEDURE — 76856 US EXAM PELVIC COMPLETE: CPT | Mod: 26,,, | Performed by: RADIOLOGY

## 2022-03-16 PROCEDURE — 3074F SYST BP LT 130 MM HG: CPT | Mod: CPTII,,, | Performed by: SPECIALIST

## 2022-03-16 PROCEDURE — 3008F PR BODY MASS INDEX (BMI) DOCUMENTED: ICD-10-PCS | Mod: CPTII,,, | Performed by: SPECIALIST

## 2022-03-16 NOTE — PROGRESS NOTES
25 yo WF  presents with c/o missed menses with LMP  UPT neg. Pt sdtates prior to , menses q month. Pt does admit to truncal weight gain, some relative scalp thinning and terminal hair at mentum over past year or two.  Pt states recent pap smear and HPV neg  Past Medical History:   Diagnosis Date    Agoraphobia with panic attacks 2018    Anxiety 2018    Fibromyalgia     GERD (gastroesophageal reflux disease)     Migraines     Obesity (BMI 35.0-39.9 without comorbidity)     Pyelonephritis     Urticaria 2008    lasted 3 months resolved       Past Surgical History:   Procedure Laterality Date    TONSILLECTOMY         Family History   Problem Relation Age of Onset    Diabetes Mother     Mental illness Mother         gambling problem    Depression Mother     Uterine cancer Paternal Grandmother     Cancer Paternal Grandmother     Mental illness Sister         borderline personality disorder    Alcohol abuse Father     Depression Father     Cancer Maternal Grandmother     Miscarriages / Stillbirths Maternal Grandmother     Mental illness Sister        Social History     Socioeconomic History    Marital status:    Tobacco Use    Smoking status: Current Every Day Smoker     Packs/day: 0.50     Years: 5.00     Pack years: 2.50     Types: Cigarettes, Vaping with nicotine     Start date: 6/3/2012     Last attempt to quit: 2019     Years since quittin.2    Smokeless tobacco: Never Used   Substance and Sexual Activity    Alcohol use: Yes     Alcohol/week: 1.0 standard drink     Types: 1 Glasses of wine per week    Drug use: No    Sexual activity: Yes     Partners: Male     Birth control/protection: Coitus interruptus, Spermicide     Social Determinants of Health     Financial Resource Strain: Medium Risk    Difficulty of Paying Living Expenses: Somewhat hard   Food Insecurity: No Food Insecurity    Worried About Running Out of Food in the Last Year: Never  true    Ran Out of Food in the Last Year: Never true   Transportation Needs: No Transportation Needs    Lack of Transportation (Medical): No    Lack of Transportation (Non-Medical): No   Physical Activity: Insufficiently Active    Days of Exercise per Week: 2 days    Minutes of Exercise per Session: 30 min   Stress: Stress Concern Present    Feeling of Stress : Very much   Social Connections: Unknown    Frequency of Communication with Friends and Family: Twice a week    Frequency of Social Gatherings with Friends and Family: Once a week    Active Member of Clubs or Organizations: No    Attends Club or Organization Meetings: Never    Marital Status:    Housing Stability: High Risk    Unable to Pay for Housing in the Last Year: Yes    Number of Places Lived in the Last Year: 1    Unstable Housing in the Last Year: No       Current Outpatient Medications   Medication Sig Dispense Refill    ALPRAZolam (XANAX) 0.25 MG tablet Take 1 tablet (0.25 mg total) by mouth 2 (two) times daily as needed for Anxiety. 20 tablet 0    naproxen (NAPROSYN) 500 MG tablet Take 1 tablet (500 mg total) by mouth 2 (two) times daily as needed (migraine). 60 tablet 5    omeprazole (PRILOSEC) 20 MG capsule Take 1 capsule (20 mg total) by mouth once daily. 90 capsule 1     No current facility-administered medications for this visit.       Review of patient's allergies indicates:   Allergen Reactions    Bactrim [sulfamethoxazole-trimethoprim] Rash       Review of System:   General: no chills, fever, night sweats, weight gain or weight loss  Psychological: no depression or suicidal ideation  Breasts: no new or changing breast lumps, nipple discharge or masses.  Respiratory: no cough, shortness of breath, or wheezing  Cardiovascular: no chest pain or dyspnea on exertion  Gastrointestinal: no abdominal pain, change in bowel habits, or black or bloody stools  Genito-Urinary: no incontinence, urinary frequency/urgency or  vulvar/vaginal symptoms, pelvic pain DELAYED MENSES  Musculoskeletal: no gait disturbance or muscular weakness    I discussed delayed menses and hirsuit changes and discussed possible etiologies includingthyroid dysfunction PCOS, metabolic syndrome etc.  I recommend PCOS panel and pelvic u/s to rule outthe above and need for treatment  Answered all questions and will have pt RTO for follow up pending the above results

## 2022-03-17 ENCOUNTER — LAB VISIT (OUTPATIENT)
Dept: LAB | Facility: HOSPITAL | Age: 27
End: 2022-03-17
Attending: SPECIALIST
Payer: MEDICAID

## 2022-03-17 ENCOUNTER — PATIENT MESSAGE (OUTPATIENT)
Dept: OBSTETRICS AND GYNECOLOGY | Facility: CLINIC | Age: 27
End: 2022-03-17
Payer: MEDICAID

## 2022-03-17 DIAGNOSIS — E28.2 PCOS (POLYCYSTIC OVARIAN SYNDROME): ICD-10-CM

## 2022-03-17 LAB
DHEA-S SERPL-MCNC: 409.9 UG/DL (ref 95.8–511.7)
FSH SERPL-ACNC: 4.05 MIU/ML
GLUCOSE SERPL-MCNC: 89 MG/DL (ref 70–110)
INSULIN COLLECTION INTERVAL: NORMAL
INSULIN SERPL-ACNC: 17.9 UU/ML
LH SERPL-ACNC: 11.8 MIU/ML
PROLACTIN SERPL IA-MCNC: 8 NG/ML (ref 5.2–26.5)
TESTOST SERPL-MCNC: 73 NG/DL (ref 5–73)

## 2022-03-17 PROCEDURE — 84403 ASSAY OF TOTAL TESTOSTERONE: CPT | Performed by: SPECIALIST

## 2022-03-17 PROCEDURE — 83525 ASSAY OF INSULIN: CPT | Performed by: SPECIALIST

## 2022-03-17 PROCEDURE — 83001 ASSAY OF GONADOTROPIN (FSH): CPT | Performed by: SPECIALIST

## 2022-03-17 PROCEDURE — 83002 ASSAY OF GONADOTROPIN (LH): CPT | Performed by: SPECIALIST

## 2022-03-17 PROCEDURE — 84402 ASSAY OF FREE TESTOSTERONE: CPT | Performed by: SPECIALIST

## 2022-03-17 PROCEDURE — 84146 ASSAY OF PROLACTIN: CPT | Performed by: SPECIALIST

## 2022-03-17 PROCEDURE — 82947 ASSAY GLUCOSE BLOOD QUANT: CPT | Performed by: SPECIALIST

## 2022-03-17 PROCEDURE — 82627 DEHYDROEPIANDROSTERONE: CPT | Performed by: SPECIALIST

## 2022-03-21 LAB — TESTOST FREE SERPL-MCNC: 1.8 PG/ML

## 2022-03-31 ENCOUNTER — OFFICE VISIT (OUTPATIENT)
Dept: OBSTETRICS AND GYNECOLOGY | Facility: CLINIC | Age: 27
End: 2022-03-31
Payer: MEDICAID

## 2022-03-31 VITALS
SYSTOLIC BLOOD PRESSURE: 120 MMHG | HEIGHT: 63 IN | WEIGHT: 220.44 LBS | BODY MASS INDEX: 39.06 KG/M2 | DIASTOLIC BLOOD PRESSURE: 72 MMHG

## 2022-03-31 DIAGNOSIS — E28.2 PCOS (POLYCYSTIC OVARIAN SYNDROME): Primary | ICD-10-CM

## 2022-03-31 PROCEDURE — 3008F BODY MASS INDEX DOCD: CPT | Mod: CPTII,,, | Performed by: SPECIALIST

## 2022-03-31 PROCEDURE — 99999 PR PBB SHADOW E&M-EST. PATIENT-LVL III: CPT | Mod: PBBFAC,,, | Performed by: SPECIALIST

## 2022-03-31 PROCEDURE — 99213 OFFICE O/P EST LOW 20 MIN: CPT | Mod: PBBFAC,PN | Performed by: SPECIALIST

## 2022-03-31 PROCEDURE — 99213 OFFICE O/P EST LOW 20 MIN: CPT | Mod: S$PBB,,, | Performed by: SPECIALIST

## 2022-03-31 PROCEDURE — 1159F MED LIST DOCD IN RCRD: CPT | Mod: CPTII,,, | Performed by: SPECIALIST

## 2022-03-31 PROCEDURE — 1159F PR MEDICATION LIST DOCUMENTED IN MEDICAL RECORD: ICD-10-PCS | Mod: CPTII,,, | Performed by: SPECIALIST

## 2022-03-31 PROCEDURE — 3078F PR MOST RECENT DIASTOLIC BLOOD PRESSURE < 80 MM HG: ICD-10-PCS | Mod: CPTII,,, | Performed by: SPECIALIST

## 2022-03-31 PROCEDURE — 3074F SYST BP LT 130 MM HG: CPT | Mod: CPTII,,, | Performed by: SPECIALIST

## 2022-03-31 PROCEDURE — 99213 PR OFFICE/OUTPT VISIT, EST, LEVL III, 20-29 MIN: ICD-10-PCS | Mod: S$PBB,,, | Performed by: SPECIALIST

## 2022-03-31 PROCEDURE — 3078F DIAST BP <80 MM HG: CPT | Mod: CPTII,,, | Performed by: SPECIALIST

## 2022-03-31 PROCEDURE — 99999 PR PBB SHADOW E&M-EST. PATIENT-LVL III: ICD-10-PCS | Mod: PBBFAC,,, | Performed by: SPECIALIST

## 2022-03-31 PROCEDURE — 3074F PR MOST RECENT SYSTOLIC BLOOD PRESSURE < 130 MM HG: ICD-10-PCS | Mod: CPTII,,, | Performed by: SPECIALIST

## 2022-03-31 PROCEDURE — 3008F PR BODY MASS INDEX (BMI) DOCUMENTED: ICD-10-PCS | Mod: CPTII,,, | Performed by: SPECIALIST

## 2022-03-31 RX ORDER — METFORMIN HYDROCHLORIDE 500 MG/1
500 TABLET ORAL
Qty: 90 TABLET | Refills: 1 | Status: SHIPPED | OUTPATIENT
Start: 2022-03-31 | End: 2022-06-27

## 2022-03-31 RX ORDER — NORGESTIMATE AND ETHINYL ESTRADIOL 7DAYSX3 LO
1 KIT ORAL DAILY
Qty: 28 TABLET | Refills: 11 | Status: SHIPPED | OUTPATIENT
Start: 2022-03-31 | End: 2023-03-31

## 2022-04-07 NOTE — PROGRESS NOTES
27 yo WF presents for follow up Underwent pelvic u/s as well as PCOS panel.  Pelvic u/s reveals the following..    Uterus:     Size: 7.8 x 4.5 x 5.6 cm     Masses: None     Endometrium: Normal in this pre menopausal patient, measuring 8.8 mm.     Incidental note is made of multiple subcentimeter nabothian cysts.     Right ovary:     Size: 3.3 x 1.8 x 3.0 cm (8.9 cc volume)     Appearance: There are numerous (greater than 25) cysts subcentimeter simple follicular cyst present, occurring predominately along the periphery of the ovary.     Vascular flow: Normal.     Left ovary:     Size: 2.8 x 1.8 x 2.7 cm (6.8 cc volume)     Appearance: There are numerous (greater than 25) simple follicular cyst present, predominately occurring along the periphery of the ovary.  There is an 11 mm simple left adnexal cyst immediately adjacent to the ovary which may represent an exophytic ovarian cyst versus paraovarian cyst.  11 additional 11 mm minimally complex left adnexal cyst with a single thin internal septation appears separate from the ovary and is favored to represent a paraovarian cyst.     Vascular Flow: Normal.     Free Fluid:     None.     Impression:     1. Numerous simple follicular cysts seen throughout the bilateral ovaries as above.  Ovaries are normal in size.  2. Two possible paraovarian cyst seen in the left adnexa as above, 1 of which is minimally complex.  3. Normal sonographic appearance of the uterus.     Fasting Insulin WNL  Past Medical History:   Diagnosis Date    Agoraphobia with panic attacks 1/16/2018    Anxiety 1/16/2018    Fibromyalgia     GERD (gastroesophageal reflux disease)     Migraines     Obesity (BMI 35.0-39.9 without comorbidity)     Pyelonephritis 2011    Urticaria 2008    lasted 3 months resolved       Past Surgical History:   Procedure Laterality Date    TONSILLECTOMY         Family History   Problem Relation Age of Onset    Diabetes Mother     Mental illness Mother          gambling problem    Depression Mother     Uterine cancer Paternal Grandmother     Cancer Paternal Grandmother     Mental illness Sister         borderline personality disorder    Alcohol abuse Father     Depression Father     Cancer Maternal Grandmother     Miscarriages / Stillbirths Maternal Grandmother     Mental illness Sister        Social History     Socioeconomic History    Marital status:    Tobacco Use    Smoking status: Current Every Day Smoker     Packs/day: 0.50     Years: 5.00     Pack years: 2.50     Types: Cigarettes, Vaping with nicotine     Start date: 6/3/2012     Last attempt to quit: 2019     Years since quittin.3    Smokeless tobacco: Never Used   Substance and Sexual Activity    Alcohol use: Yes     Alcohol/week: 1.0 standard drink     Types: 1 Glasses of wine per week    Drug use: No    Sexual activity: Yes     Partners: Male     Birth control/protection: Coitus interruptus, Spermicide     Social Determinants of Health     Financial Resource Strain: Medium Risk    Difficulty of Paying Living Expenses: Somewhat hard   Food Insecurity: No Food Insecurity    Worried About Running Out of Food in the Last Year: Never true    Ran Out of Food in the Last Year: Never true   Transportation Needs: No Transportation Needs    Lack of Transportation (Medical): No    Lack of Transportation (Non-Medical): No   Physical Activity: Insufficiently Active    Days of Exercise per Week: 2 days    Minutes of Exercise per Session: 30 min   Stress: Stress Concern Present    Feeling of Stress : Very much   Social Connections: Unknown    Frequency of Communication with Friends and Family: Twice a week    Frequency of Social Gatherings with Friends and Family: Once a week    Active Member of Clubs or Organizations: No    Attends Club or Organization Meetings: Never    Marital Status:    Housing Stability: High Risk    Unable to Pay for Housing in the Last Year: Yes     Number of Places Lived in the Last Year: 1    Unstable Housing in the Last Year: No       Current Outpatient Medications   Medication Sig Dispense Refill    ALPRAZolam (XANAX) 0.25 MG tablet Take 1 tablet (0.25 mg total) by mouth 2 (two) times daily as needed for Anxiety. 20 tablet 0    naproxen (NAPROSYN) 500 MG tablet Take 1 tablet (500 mg total) by mouth 2 (two) times daily as needed (migraine). 60 tablet 5    metFORMIN (GLUCOPHAGE) 500 MG tablet Take 1 tablet (500 mg total) by mouth daily with breakfast. 90 tablet 1    norgestimate-ethinyl estradioL (ORTHO TRI-CYCLEN LO) 0.18/0.215/0.25 mg-25 mcg tablet Take 1 tablet by mouth once daily. 28 tablet 11    omeprazole (PRILOSEC) 20 MG capsule Take 1 capsule (20 mg total) by mouth once daily. 90 capsule 1     No current facility-administered medications for this visit.       Review of patient's allergies indicates:   Allergen Reactions    Bactrim [sulfamethoxazole-trimethoprim] Rash       Review of System:   General: no chills, fever, night sweats, weight gain or weight loss  Psychological: no depression or suicidal ideation  Breasts: no new or changing breast lumps, nipple discharge or masses.  Respiratory: no cough, shortness of breath, or wheezing  Cardiovascular: no chest pain or dyspnea on exertion  Gastrointestinal: no abdominal pain, change in bowel habits, or black or bloody stools  Genito-Urinary: no incontinence, urinary frequency/urgency or vulvar/vaginal symptoms.  Musculoskeletal: no gait disturbance or muscular weakness    I discussed PCOS and effects of metabolism, fertility and weight  I discussed treatment options and rec low dose Metformin and ovarian suppressive therapy  Discussed low carb diet and weight loss  Answered all questions and will have pt RTO 8 weeks12 weeks follow up

## 2022-04-16 ENCOUNTER — PATIENT MESSAGE (OUTPATIENT)
Dept: FAMILY MEDICINE | Facility: CLINIC | Age: 27
End: 2022-04-16
Payer: MEDICAID

## 2022-09-17 NOTE — PROGRESS NOTES
Patient states she had some thoracic back pain bilateral started about 3 weeks ago when she had upper respiratory symptoms with significant cough.  States negative home COVID test.  Symptoms seem to be improving now.  She is using naproxen.  Last menstrual cycle was January 14-18.  States negative home hcg  Test.  States irregular cycles.  Off OCP due to concerns about weight gain.  Interested in tubal ligation.  Has 2 children at home.    Tomi was seen today for back pain.    Diagnoses and all orders for this visit:    Acute bilateral thoracic back pain    Late menses  -     PREGNANCY TEST, URINE RAPID  -     Ambulatory referral/consult to Obstetrics / Gynecology; Future    Unwanted fertility  -     PREGNANCY TEST, URINE RAPID  -     Ambulatory referral/consult to Obstetrics / Gynecology; Future      Back discomfort most likely related to strain with recent coughing.  This seems to be resolving per patient.  She could use naproxen that she has at home.  Follow-up as needed.            Past Medical History:  Past Medical History:   Diagnosis Date    Agoraphobia with panic attacks 1/16/2018    Anxiety 1/16/2018    Fibromyalgia     GERD (gastroesophageal reflux disease)     Migraines     Obesity (BMI 35.0-39.9 without comorbidity)     Pyelonephritis 2011    Urticaria 2008    lasted 3 months resolved     Past Surgical History:   Procedure Laterality Date    TONSILLECTOMY       Review of patient's allergies indicates:   Allergen Reactions    Bactrim [sulfamethoxazole-trimethoprim] Rash     Current Outpatient Medications on File Prior to Visit   Medication Sig Dispense Refill    ALPRAZolam (XANAX) 0.25 MG tablet Take 1 tablet (0.25 mg total) by mouth 2 (two) times daily as needed for Anxiety. 20 tablet 0    naproxen (NAPROSYN) 500 MG tablet Take 1 tablet (500 mg total) by mouth 2 (two) times daily as needed (migraine). 60 tablet 5    omeprazole (PRILOSEC) 20 MG capsule Take 1 capsule (20 mg total) by mouth  once daily. 90 capsule 1    [DISCONTINUED] acetaminophen (TYLENOL) 500 MG tablet Take 2 tablets (1,000 mg total) by mouth every 6 (six) hours as needed for Pain.      [DISCONTINUED] cyclobenzaprine (FLEXERIL) 10 MG tablet       [DISCONTINUED] methylPREDNISolone (MEDROL DOSEPACK) 4 mg tablet follow package directions 21 tablet 0    [DISCONTINUED] mupirocin (BACTROBAN) 2 % ointment Apply 1 Tube topically 3 (three) times daily.      [DISCONTINUED] sertraline (ZOLOFT) 100 MG tablet Take 1 tablet (100 mg total) by mouth once daily. 90 tablet 3    [DISCONTINUED] TRI-LO-SPRINTEC 0.18/0.215/0.25 mg-25 mcg tablet Take 1 tablet by mouth once daily. 84 tablet 0     No current facility-administered medications on file prior to visit.     Social History     Socioeconomic History    Marital status:    Tobacco Use    Smoking status: Current Every Day Smoker     Packs/day: 0.50     Years: 5.00     Pack years: 2.50     Types: Cigarettes, Vaping with nicotine     Start date: 6/3/2012     Last attempt to quit: 2019     Years since quittin.2    Smokeless tobacco: Never Used   Substance and Sexual Activity    Alcohol use: Yes     Alcohol/week: 1.0 standard drink     Types: 1 Glasses of wine per week    Drug use: No    Sexual activity: Yes     Partners: Male     Birth control/protection: Coitus interruptus, Spermicide     Social Determinants of Health     Financial Resource Strain: Medium Risk    Difficulty of Paying Living Expenses: Somewhat hard   Food Insecurity: No Food Insecurity    Worried About Running Out of Food in the Last Year: Never true    Ran Out of Food in the Last Year: Never true   Transportation Needs: No Transportation Needs    Lack of Transportation (Medical): No    Lack of Transportation (Non-Medical): No   Physical Activity: Insufficiently Active    Days of Exercise per Week: 2 days    Minutes of Exercise per Session: 30 min   Stress: Stress Concern Present    Feeling of Stress :  "Very much   Social Connections: Unknown    Frequency of Communication with Friends and Family: Twice a week    Frequency of Social Gatherings with Friends and Family: Once a week    Active Member of Clubs or Organizations: No    Attends Club or Organization Meetings: Never    Marital Status:    Housing Stability: High Risk    Unable to Pay for Housing in the Last Year: Yes    Number of Places Lived in the Last Year: 1    Unstable Housing in the Last Year: No     Family History   Problem Relation Age of Onset    Diabetes Mother     Mental illness Mother         gambling problem    Depression Mother     Uterine cancer Paternal Grandmother     Cancer Paternal Grandmother     Mental illness Sister         borderline personality disorder    Alcohol abuse Father     Depression Father     Cancer Maternal Grandmother     Miscarriages / Stillbirths Maternal Grandmother     Mental illness Sister            ROS:  GENERAL: No fever, chills,  or significant weight changes.   CARDIOVASCULAR: Denies chest pain, PND, orthopnea or reduced exercise tolerance.  ABDOMEN: Appetite fine. Denies diarrhea, abdominal pain, hematemesis or blood in stool.  URINARY: No flank pain, dysuria or hematuria.    Vitals:    02/28/22 1351   BP: 126/82   Pulse: 92   Temp: 97.9 °F (36.6 °C)   TempSrc: Other (see comments)   SpO2: 99%   Weight: 99.3 kg (218 lb 14.4 oz)   Height: 5' 3" (1.6 m)       Wt Readings from Last 3 Encounters:   02/28/22 99.3 kg (218 lb 14.4 oz)   06/25/21 101.9 kg (224 lb 9.6 oz)   10/29/20 100.8 kg (222 lb 3.2 oz)       APPEARANCE: Well nourished, well developed, in no acute distress.    HEAD: Normocephalic.  Atraumatic.  Sinuses nontender  Ears:  Tympanic membranes intact.  No effusion or erythema.  Nose clear  Throat no erythema or exudate  EYES:   Right eye: Pupil reactive.  Conjunctiva clear.    Left eye: Pupil reactive.  Conjunctiva clear.    NECK: Supple. No bruits.  No JVD.  No cervical " lymphadenopathy.  No thyromegaly.    CHEST: Breath sounds clear bilaterally.  Normal respiratory effort  CARDIOVASCULAR: Normal rate.  Regular rhythm.  No murmurs.  No rub.  No gallops.   No edema.  MENTAL STATUS: Alert.  Oriented x 3.  Back without significant tenderness palpation.   wheelchair

## 2024-09-06 ENCOUNTER — OFFICE VISIT (OUTPATIENT)
Dept: FAMILY MEDICINE | Facility: CLINIC | Age: 29
End: 2024-09-06
Payer: COMMERCIAL

## 2024-09-06 VITALS
HEART RATE: 68 BPM | SYSTOLIC BLOOD PRESSURE: 127 MMHG | WEIGHT: 220.31 LBS | TEMPERATURE: 98 F | HEIGHT: 63 IN | DIASTOLIC BLOOD PRESSURE: 87 MMHG | BODY MASS INDEX: 39.04 KG/M2

## 2024-09-06 DIAGNOSIS — L30.9 ECZEMA, UNSPECIFIED TYPE: Primary | ICD-10-CM

## 2024-09-06 PROCEDURE — 99999 PR PBB SHADOW E&M-EST. PATIENT-LVL III: CPT | Mod: PBBFAC,,, | Performed by: FAMILY MEDICINE

## 2024-09-06 RX ORDER — TRIAMCINOLONE ACETONIDE 0.25 MG/G
CREAM TOPICAL 2 TIMES DAILY PRN
Qty: 80 G | Refills: 0 | Status: SHIPPED | OUTPATIENT
Start: 2024-09-06

## 2024-09-06 RX ORDER — OMEPRAZOLE 20 MG/1
20 TABLET, DELAYED RELEASE ORAL DAILY
COMMUNITY

## 2024-09-06 NOTE — PROGRESS NOTES
History of Present Illness    Ms. Ervin reports a lesion on her left breast resembling eczema, present for approximately 1 year. The affected area is characterized by dry, itchy skin with cyclical exacerbations and improvements. Initially, patient treated unsuccessfully at home for ringworm. For the past 6 months, the patient has been managing the condition through exfoliation and application of a facial moisturizer, resulting in improvement but not complete resolution. Her mother has psoriasis and that her condition appears similar. The lesion has remained stable in size and localized to the same area. The patient performs regular self-exams of the area without detecting any underlying lumps or abnormalities. She denies the presence of similar lesions elsewhere on her body or use of steroid creams on the area.      ROS:  Integumentary: +rash, +dry skin, +itching           Tomi was seen today for psoriasis.    Diagnoses and all orders for this visit:    Eczema, unspecified type    Other orders  -     triamcinolone acetonide 0.025% (KENALOG) 0.025 % cream; Apply topically 2 (two) times daily as needed (rash for up to 2 weeks, then PRN).      ATOPIC DERMATITIS/ECZEMA:  - Assessed breast lesion as likely atopic dermatitis or eczema based on physical exam and patient history.  - Will prescribe a low-potency topical steroid cream for short-term use.  - If condition resolves, discontinue use and store for potential future flare-ups.   continue current treatment with moisturizer.  DERMATOLOGY REFERRAL:  - Consider dermatology referral  1 month if the condition does not improve with prescribed treatment.                 Past Medical History:  Past Medical History:   Diagnosis Date    Agoraphobia with panic attacks 1/16/2018    Anxiety 1/16/2018    Fibromyalgia     GERD (gastroesophageal reflux disease)     Migraines     Obesity (BMI 35.0-39.9 without comorbidity)     Pyelonephritis 2011    Urticaria 2008    lasted 3  months resolved     Past Surgical History:   Procedure Laterality Date    TONSILLECTOMY       Review of patient's allergies indicates:   Allergen Reactions    Doxycycline Hives    Bactrim [sulfamethoxazole-trimethoprim] Rash    Clindamycin Rash     Current Outpatient Medications on File Prior to Visit   Medication Sig Dispense Refill    omeprazole (PRILOSEC OTC) 20 MG tablet Take 20 mg by mouth once daily.      omeprazole (PRILOSEC) 20 MG capsule Take 1 capsule (20 mg total) by mouth once daily. 90 capsule 1    [DISCONTINUED] ALPRAZolam (XANAX) 0.25 MG tablet Take 1 tablet (0.25 mg total) by mouth 2 (two) times daily as needed for Anxiety. 20 tablet 0    [DISCONTINUED] metFORMIN (GLUCOPHAGE) 500 MG tablet TAKE 1 TABLET (500 MG) BY MOUTH 1 TIME A DAY WITH BREAKFAST 90 tablet 1    [DISCONTINUED] naproxen (NAPROSYN) 500 MG tablet Take 1 tablet (500 mg total) by mouth 2 (two) times daily as needed (migraine). 60 tablet 5    [DISCONTINUED] norgestimate-ethinyl estradioL (ORTHO TRI-CYCLEN LO) 0.18/0.215/0.25 mg-25 mcg tablet Take 1 tablet by mouth once daily. 28 tablet 11     No current facility-administered medications on file prior to visit.     Social History     Socioeconomic History    Marital status:    Tobacco Use    Smoking status: Every Day     Current packs/day: 0.00     Average packs/day: 0.5 packs/day for 7.5 years (3.7 ttl pk-yrs)     Types: Cigarettes, Vaping with nicotine     Start date: 6/3/2012     Last attempt to quit: 2019     Years since quittin.7    Smokeless tobacco: Never   Substance and Sexual Activity    Alcohol use: Yes     Alcohol/week: 1.0 standard drink of alcohol     Types: 1 Glasses of wine per week    Drug use: No    Sexual activity: Yes     Partners: Male     Birth control/protection: Coitus interruptus, Spermicide     Social Determinants of Health     Financial Resource Strain: Low Risk  (2024)    Overall Financial Resource Strain (CARDIA)     Difficulty of Paying  "Living Expenses: Not hard at all   Food Insecurity: No Food Insecurity (9/5/2024)    Hunger Vital Sign     Worried About Running Out of Food in the Last Year: Never true     Ran Out of Food in the Last Year: Never true   Transportation Needs: No Transportation Needs (2/28/2022)    PRAPARE - Transportation     Lack of Transportation (Medical): No     Lack of Transportation (Non-Medical): No   Physical Activity: Inactive (9/5/2024)    Exercise Vital Sign     Days of Exercise per Week: 0 days     Minutes of Exercise per Session: 0 min   Stress: No Stress Concern Present (9/5/2024)    [employer] [place] [employer] - Occupational Stress Questionnaire     Feeling of Stress : Not at all   Housing Stability: Unknown (9/5/2024)    Housing Stability Vital Sign     Unable to Pay for Housing in the Last Year: No     Family History   Problem Relation Name Age of Onset    Diabetes Mother jaleel     Mental illness Mother jaleel         gambling problem    Depression Mother jaleel     Uterine cancer Paternal Grandmother Colette     Cancer Paternal Grandmother Colette     Mental illness Sister          borderline personality disorder    Alcohol abuse Father shannon     Depression Father shannon     Cancer Maternal Grandmother anna     Miscarriages / Stillbirths Maternal Grandmother anna     Mental illness Sister Valerie            ROS:  GENERAL: No fever, chills,  or significant weight changes.   CARDIOVASCULAR: Denies chest pain, PND, orthopnea or reduced exercise tolerance.  ABDOMEN: Appetite fine. Denies diarrhea, abdominal pain, hematemesis or blood in stool.  URINARY: No flank pain, dysuria or hematuria.    Vitals:    09/06/24 1059   BP: 127/87   Pulse: 68   Temp: 97.5 °F (36.4 °C)   TempSrc: Temporal   Weight: 99.9 kg (220 lb 4.8 oz)   Height: 5' 3" (1.6 m)       Wt Readings from Last 3 Encounters:   09/06/24 99.9 kg (220 lb 4.8 oz)   03/31/22 100 kg (220 lb 7.4 oz)   03/16/22 98.7 kg (217 lb 9.5 oz)       APPEARANCE: Well " nourished, well developed, in no acute distress.    HEAD: Normocephalic.  Atraumatic.  EYES:   Right eye: Pupil reactive.  Conjunctiva clear.    Left eye: Pupil reactive.  Conjunctiva clear.    NECK: Supple.   CHEST: Breath sounds clear bilaterally.  Normal respiratory effort  CARDIOVASCULAR: Normal rate.  Regular rhythm.  No murmurs.  No rub.  No gallops.   No edema.  MENTAL STATUS: Alert.  Oriented x 3.  A skin at the left breast has a dry patch without significant scale adjacent to the areola.  Photograph below.  There is no tenderness.  No masses.  No induration or edema.